# Patient Record
Sex: FEMALE | Race: WHITE | Employment: OTHER | URBAN - METROPOLITAN AREA
[De-identification: names, ages, dates, MRNs, and addresses within clinical notes are randomized per-mention and may not be internally consistent; named-entity substitution may affect disease eponyms.]

---

## 2017-06-15 PROBLEM — M75.01 ADHESIVE CAPSULITIS OF RIGHT SHOULDER: Status: ACTIVE | Noted: 2017-06-15

## 2017-06-16 PROBLEM — M25.819 SHOULDER IMPINGEMENT: Status: ACTIVE | Noted: 2017-06-16

## 2017-06-16 PROBLEM — M75.40 SHOULDER IMPINGEMENT: Status: ACTIVE | Noted: 2017-06-16

## 2017-06-27 PROBLEM — M17.11 PRIMARY OSTEOARTHRITIS OF RIGHT KNEE: Status: ACTIVE | Noted: 2017-06-27

## 2018-04-30 ENCOUNTER — HOSPITAL ENCOUNTER (OUTPATIENT)
Dept: GENERAL RADIOLOGY | Age: 53
Discharge: HOME | End: 2018-05-02
Payer: COMMERCIAL

## 2018-04-30 ENCOUNTER — HOSPITAL ENCOUNTER (OUTPATIENT)
Age: 53
Discharge: HOME | End: 2018-05-02
Payer: COMMERCIAL

## 2018-04-30 ENCOUNTER — HOSPITAL ENCOUNTER (OUTPATIENT)
Age: 53
Discharge: HOME | End: 2018-04-30
Payer: COMMERCIAL

## 2018-04-30 DIAGNOSIS — R06.02 SOB (SHORTNESS OF BREATH): ICD-10-CM

## 2018-04-30 LAB
ABO/RH: NORMAL
ANION GAP SERPL CALCULATED.3IONS-SCNC: 13 MMOL/L (ref 7–16)
ANTIBODY SCREEN: NORMAL
APTT: 27.8 SEC (ref 24.5–35.1)
BACTERIA: ABNORMAL /HPF
BILIRUBIN URINE: NEGATIVE
BLOOD, URINE: ABNORMAL
BUN BLDV-MCNC: 31 MG/DL (ref 6–20)
CALCIUM SERPL-MCNC: 9.6 MG/DL (ref 8.6–10.2)
CHLORIDE BLD-SCNC: 99 MMOL/L (ref 98–107)
CLARITY: CLEAR
CO2: 25 MMOL/L (ref 22–29)
COLOR: YELLOW
CREAT SERPL-MCNC: 1.4 MG/DL (ref 0.5–1)
EPITHELIAL CELLS, UA: ABNORMAL /HPF
GFR AFRICAN AMERICAN: 47
GFR NON-AFRICAN AMERICAN: 39 ML/MIN/1.73
GLUCOSE BLD-MCNC: 112 MG/DL (ref 74–109)
GLUCOSE URINE: NEGATIVE MG/DL
HCT VFR BLD CALC: 35.9 % (ref 34–48)
HEMOGLOBIN: 11.7 G/DL (ref 11.5–15.5)
INR BLD: 1
KETONES, URINE: NEGATIVE MG/DL
LEUKOCYTE ESTERASE, URINE: ABNORMAL
MCH RBC QN AUTO: 28.7 PG (ref 26–35)
MCHC RBC AUTO-ENTMCNC: 32.6 % (ref 32–34.5)
MCV RBC AUTO: 88 FL (ref 80–99.9)
NITRITE, URINE: NEGATIVE
PDW BLD-RTO: 12.5 FL (ref 11.5–15)
PH UA: 6.5 (ref 5–9)
PLATELET # BLD: 394 E9/L (ref 130–450)
PMV BLD AUTO: 9.5 FL (ref 7–12)
POTASSIUM SERPL-SCNC: 5.7 MMOL/L (ref 3.5–5)
PROTEIN UA: ABNORMAL MG/DL
PROTHROMBIN TIME: 11 SEC (ref 9.3–12.4)
RBC # BLD: 4.08 E12/L (ref 3.5–5.5)
RBC UA: ABNORMAL /HPF (ref 0–2)
SODIUM BLD-SCNC: 137 MMOL/L (ref 132–146)
SPECIFIC GRAVITY UA: 1.02 (ref 1–1.03)
UROBILINOGEN, URINE: 0.2 E.U./DL
WBC # BLD: 8.5 E9/L (ref 4.5–11.5)
WBC UA: ABNORMAL /HPF (ref 0–5)

## 2018-04-30 PROCEDURE — 86901 BLOOD TYPING SEROLOGIC RH(D): CPT

## 2018-04-30 PROCEDURE — 86850 RBC ANTIBODY SCREEN: CPT

## 2018-04-30 PROCEDURE — 85730 THROMBOPLASTIN TIME PARTIAL: CPT

## 2018-04-30 PROCEDURE — 71046 X-RAY EXAM CHEST 2 VIEWS: CPT

## 2018-04-30 PROCEDURE — 81001 URINALYSIS AUTO W/SCOPE: CPT

## 2018-04-30 PROCEDURE — 36415 COLL VENOUS BLD VENIPUNCTURE: CPT

## 2018-04-30 PROCEDURE — 85610 PROTHROMBIN TIME: CPT

## 2018-04-30 PROCEDURE — 85027 COMPLETE CBC AUTOMATED: CPT

## 2018-04-30 PROCEDURE — 86900 BLOOD TYPING SEROLOGIC ABO: CPT

## 2018-04-30 PROCEDURE — 80048 BASIC METABOLIC PNL TOTAL CA: CPT

## 2018-05-02 ENCOUNTER — HOSPITAL ENCOUNTER (OUTPATIENT)
Age: 53
Discharge: HOME | End: 2018-05-02
Payer: COMMERCIAL

## 2018-05-02 LAB
ANION GAP SERPL CALCULATED.3IONS-SCNC: 11 MMOL/L (ref 7–16)
BUN BLDV-MCNC: 39 MG/DL (ref 6–20)
CALCIUM SERPL-MCNC: 9.1 MG/DL (ref 8.6–10.2)
CHLORIDE BLD-SCNC: 97 MMOL/L (ref 98–107)
CO2: 24 MMOL/L (ref 22–29)
CREAT SERPL-MCNC: 1.3 MG/DL (ref 0.5–1)
GFR AFRICAN AMERICAN: 52
GFR NON-AFRICAN AMERICAN: 43 ML/MIN/1.73
GLUCOSE BLD-MCNC: 226 MG/DL (ref 74–109)
POTASSIUM SERPL-SCNC: 5.3 MMOL/L (ref 3.5–5)
SODIUM BLD-SCNC: 132 MMOL/L (ref 132–146)

## 2018-05-02 PROCEDURE — 80048 BASIC METABOLIC PNL TOTAL CA: CPT

## 2018-05-02 PROCEDURE — 36415 COLL VENOUS BLD VENIPUNCTURE: CPT

## 2018-05-03 ENCOUNTER — HOSPITAL ENCOUNTER (OUTPATIENT)
Dept: CARDIAC CATH/INVASIVE PROCEDURES | Age: 53
Discharge: HOME | End: 2018-05-03
Payer: COMMERCIAL

## 2018-05-03 VITALS
WEIGHT: 225 LBS | OXYGEN SATURATION: 97 % | DIASTOLIC BLOOD PRESSURE: 99 MMHG | RESPIRATION RATE: 17 BRPM | HEART RATE: 80 BPM | TEMPERATURE: 98.5 F | HEIGHT: 68 IN | SYSTOLIC BLOOD PRESSURE: 152 MMHG | BODY MASS INDEX: 34.1 KG/M2

## 2018-05-03 PROCEDURE — 2580000003 HC RX 258: Performed by: INTERNAL MEDICINE

## 2018-05-03 PROCEDURE — C1769 GUIDE WIRE: HCPCS

## 2018-05-03 PROCEDURE — 2709999900 HC NON-CHARGEABLE SUPPLY

## 2018-05-03 PROCEDURE — C1894 INTRO/SHEATH, NON-LASER: HCPCS

## 2018-05-03 PROCEDURE — 93454 CORONARY ARTERY ANGIO S&I: CPT | Performed by: INTERNAL MEDICINE

## 2018-05-03 PROCEDURE — 6360000002 HC RX W HCPCS

## 2018-05-03 PROCEDURE — 2500000003 HC RX 250 WO HCPCS

## 2018-05-03 RX ORDER — SODIUM CHLORIDE 9 MG/ML
INJECTION, SOLUTION INTRAVENOUS CONTINUOUS
Status: DISCONTINUED | OUTPATIENT
Start: 2018-05-03 | End: 2018-05-04 | Stop reason: HOSPADM

## 2018-05-03 RX ADMIN — SODIUM CHLORIDE: 9 INJECTION, SOLUTION INTRAVENOUS at 10:13

## 2018-05-22 ENCOUNTER — HOSPITAL ENCOUNTER (OUTPATIENT)
Dept: INTERVENTIONAL RADIOLOGY/VASCULAR | Age: 53
Discharge: HOME | End: 2018-05-24
Payer: COMMERCIAL

## 2018-05-22 ENCOUNTER — HOSPITAL ENCOUNTER (OUTPATIENT)
Dept: NON INVASIVE DIAGNOSTICS | Age: 53
Discharge: HOME | End: 2018-05-22
Payer: COMMERCIAL

## 2018-05-22 ENCOUNTER — HOSPITAL ENCOUNTER (OUTPATIENT)
Dept: ULTRASOUND IMAGING | Age: 53
End: 2018-05-22
Payer: COMMERCIAL

## 2018-05-22 DIAGNOSIS — I25.10 CVD (CARDIOVASCULAR DISEASE): ICD-10-CM

## 2018-05-22 DIAGNOSIS — Z01.810 PRE-OPERATIVE CARDIOVASCULAR EXAMINATION: ICD-10-CM

## 2018-05-22 LAB
LV EF: 32 %
LVEF MODALITY: NORMAL

## 2018-05-22 PROCEDURE — 93970 EXTREMITY STUDY: CPT

## 2018-05-22 PROCEDURE — 93880 EXTRACRANIAL BILAT STUDY: CPT

## 2018-05-22 PROCEDURE — 93306 TTE W/DOPPLER COMPLETE: CPT

## 2018-07-12 ENCOUNTER — HOSPITAL ENCOUNTER (OUTPATIENT)
Dept: INTERVENTIONAL RADIOLOGY/VASCULAR | Age: 53
End: 2018-07-12
Payer: COMMERCIAL

## 2018-07-12 ENCOUNTER — HOSPITAL ENCOUNTER (OUTPATIENT)
Dept: ULTRASOUND IMAGING | Age: 53
Discharge: HOME | End: 2018-07-12
Payer: COMMERCIAL

## 2018-07-12 DIAGNOSIS — M79.604 RIGHT LEG PAIN: ICD-10-CM

## 2018-07-12 PROCEDURE — 93971 EXTREMITY STUDY: CPT

## 2018-07-19 ENCOUNTER — APPOINTMENT (OUTPATIENT)
Dept: GENERAL RADIOLOGY | Age: 53
End: 2018-07-19
Payer: COMMERCIAL

## 2018-07-19 ENCOUNTER — APPOINTMENT (OUTPATIENT)
Dept: ULTRASOUND IMAGING | Age: 53
End: 2018-07-19
Payer: COMMERCIAL

## 2018-07-19 ENCOUNTER — HOSPITAL ENCOUNTER (EMERGENCY)
Age: 53
Discharge: HOME | End: 2018-07-19
Payer: COMMERCIAL

## 2018-07-19 VITALS
TEMPERATURE: 98.7 F | RESPIRATION RATE: 20 BRPM | SYSTOLIC BLOOD PRESSURE: 151 MMHG | BODY MASS INDEX: 32.58 KG/M2 | HEART RATE: 78 BPM | WEIGHT: 215 LBS | DIASTOLIC BLOOD PRESSURE: 70 MMHG | HEIGHT: 68 IN | OXYGEN SATURATION: 98 %

## 2018-07-19 DIAGNOSIS — R60.9 PERIPHERAL EDEMA: ICD-10-CM

## 2018-07-19 DIAGNOSIS — M25.461 EFFUSION OF RIGHT KNEE: Primary | ICD-10-CM

## 2018-07-19 PROCEDURE — 93971 EXTREMITY STUDY: CPT

## 2018-07-19 PROCEDURE — 6370000000 HC RX 637 (ALT 250 FOR IP): Performed by: NURSE PRACTITIONER

## 2018-07-19 PROCEDURE — 73562 X-RAY EXAM OF KNEE 3: CPT

## 2018-07-19 PROCEDURE — 99284 EMERGENCY DEPT VISIT MOD MDM: CPT

## 2018-07-19 RX ORDER — ACETAMINOPHEN 325 MG/1
650 TABLET ORAL 2 TIMES DAILY PRN
COMMUNITY
End: 2021-02-25

## 2018-07-19 RX ORDER — HYDROCODONE BITARTRATE AND ACETAMINOPHEN 5; 325 MG/1; MG/1
1 TABLET ORAL ONCE
Status: COMPLETED | OUTPATIENT
Start: 2018-07-19 | End: 2018-07-19

## 2018-07-19 RX ORDER — AMIODARONE HYDROCHLORIDE 200 MG/1
200 TABLET ORAL DAILY
COMMUNITY
End: 2020-01-30

## 2018-07-19 RX ORDER — METOPROLOL SUCCINATE 25 MG/1
25 TABLET, EXTENDED RELEASE ORAL DAILY
COMMUNITY
End: 2020-09-25 | Stop reason: SDUPTHER

## 2018-07-19 RX ORDER — BUPROPION HYDROCHLORIDE 150 MG/1
300 TABLET ORAL EVERY MORNING
COMMUNITY
End: 2020-01-30

## 2018-07-19 RX ORDER — HYDROCODONE BITARTRATE AND ACETAMINOPHEN 5; 325 MG/1; MG/1
1 TABLET ORAL EVERY 6 HOURS PRN
Qty: 12 TABLET | Refills: 0 | Status: SHIPPED | OUTPATIENT
Start: 2018-07-19 | End: 2018-07-22

## 2018-07-19 RX ADMIN — HYDROCODONE BITARTRATE AND ACETAMINOPHEN 1 TABLET: 5; 325 TABLET ORAL at 19:50

## 2018-07-19 ASSESSMENT — PAIN SCALES - GENERAL
PAINLEVEL_OUTOF10: 9
PAINLEVEL_OUTOF10: 9

## 2018-07-19 ASSESSMENT — PAIN DESCRIPTION - LOCATION: LOCATION: KNEE

## 2018-07-19 ASSESSMENT — PAIN DESCRIPTION - DESCRIPTORS: DESCRIPTORS: PRESSURE;STABBING

## 2018-07-19 ASSESSMENT — PAIN DESCRIPTION - ORIENTATION: ORIENTATION: RIGHT

## 2018-07-19 ASSESSMENT — PAIN DESCRIPTION - PAIN TYPE: TYPE: ACUTE PAIN

## 2018-07-19 ASSESSMENT — PAIN DESCRIPTION - FREQUENCY: FREQUENCY: CONTINUOUS

## 2018-07-19 NOTE — ED PROVIDER NOTES
2018    Dr. Boyd Koroma TRANSESOPHAGEAL ECHOCARDIOGRAM  10/28/2016    Dr. Horacio Boswell r/o embolic source   Social History:  reports that she has been smoking. She has been smoking about 1.00 pack per day. She has never used smokeless tobacco. She reports that she does not drink alcohol or use drugs. Family History: family history is not on file. Allergies: Ace inhibitors and Percocet [oxycodone-acetaminophen]    Physical Exam          ED Triage Vitals [18 1921]   BP Temp Temp Source Pulse Resp SpO2 Height Weight   (!) 151/70 98.7 °F (37.1 °C) Oral 78 20 98 % 5' 8\" (1.727 m) 215 lb (97.5 kg)       Oxygen Saturation Interpretation: Normal.    Constitutional:  Alert, development consistent with age. Neck:  Normal ROM. Supple. Knee:  Right anterior:             Tenderness:  moderate. .              Swelling/Effusion: Mild. Deformity: no.              ROM: diminished range with pain. Hip:            Tenderness:  none. Swelling: None. Deformity: no.              ROM: full range of motion. Skin:  no erythema, rash or wounds noted. Joint(s) Below: ankle. Tenderness:  none. Swelling: No.              Deformity: no.             ROM: full range of motion. Skin:  no erythema, rash or wounds noted. Neurovascular: Motor deficit: none. Sensory deficit: none. Pulse deficit: none. Capillary refill: normal.  Right Calf:            Tenderness:  mild. Swellin+ nonpitting. Deformity: no.              ROM: full range of motion. Skin:  no erythema, rash or wounds noted. Gait:  limp. Lymphatics: No lymphangitis or adenopathy noted. Neurological:  Oriented. Motor functions intact.     Lab / Imaging Results   (All laboratory and radiology results have been personally reviewed by myself)  Labs:  No results found for this visit on 07/19/18. Imaging: All Radiology results interpreted by Radiologist unless otherwise noted. US DUP LOWER EXTREMITY RIGHT FANY   Final Result   No evidence of right lower extremity deep venous thrombus   from common femoral vein to proximal calf. XR KNEE RIGHT (3 VIEWS)   Final Result   No fractures. Joint effusion and arthritis. ED Course / Medical Decision Making     Medications   HYDROcodone-acetaminophen (NORCO) 5-325 MG per tablet 1 tablet (1 tablet Oral Given 7/19/18 1950)        Consult(s):   None    Procedure(s):   none. Medical Decision Making:    X-ray of the right knee was negative for acute fracture or dislocation. X-ray did show knee effusion and this is consistent with the clinical exam.  Ultrasound of the right lower extremity was repeated related to recent surgery and bilateral lower extremity edema. Ultrasound was negative for DVT. Her symptoms improve her pain medication in the emergency department. Plan is subsequently for symptom control, limited use and  immobilization with appropriate outpatient follow-up. She is instructed to return to the emergency department immediately with any new or worsening symptoms. Counseling: The emergency provider has spoken with the patient and daughter and discussed todays results, in addition to providing specific details for the plan of care and counseling regarding the diagnosis and prognosis. Questions are answered at this time and they are agreeable with the plan. Assessment      1. Effusion of right knee    2. Peripheral edema      Plan   Discharge to home  Patient condition is good    New Medications     New Prescriptions    HYDROCODONE-ACETAMINOPHEN (NORCO) 5-325 MG PER TABLET    Take 1 tablet by mouth every 6 hours as needed for Pain for up to 3 days. .     Electronically signed by DAVID Joseph CNP   DD: 7/19/18  **This report was transcribed using voice

## 2019-10-08 ENCOUNTER — OFFICE VISIT (OUTPATIENT)
Dept: ORTHOPEDIC SURGERY | Age: 54
End: 2019-10-08
Payer: MEDICARE

## 2019-10-08 VITALS — BODY MASS INDEX: 35.46 KG/M2 | HEIGHT: 68 IN | TEMPERATURE: 98 F | WEIGHT: 234 LBS

## 2019-10-08 DIAGNOSIS — M16.11 PRIMARY OSTEOARTHRITIS OF RIGHT HIP: ICD-10-CM

## 2019-10-08 DIAGNOSIS — M21.371 RIGHT FOOT DROP: ICD-10-CM

## 2019-10-08 DIAGNOSIS — M25.551 RIGHT HIP PAIN: Primary | ICD-10-CM

## 2019-10-08 PROCEDURE — 99214 OFFICE O/P EST MOD 30 MIN: CPT | Performed by: ORTHOPAEDIC SURGERY

## 2019-10-08 PROCEDURE — G8484 FLU IMMUNIZE NO ADMIN: HCPCS | Performed by: ORTHOPAEDIC SURGERY

## 2019-10-08 PROCEDURE — G8417 CALC BMI ABV UP PARAM F/U: HCPCS | Performed by: ORTHOPAEDIC SURGERY

## 2019-10-08 PROCEDURE — G8598 ASA/ANTIPLAT THER USED: HCPCS | Performed by: ORTHOPAEDIC SURGERY

## 2019-10-08 PROCEDURE — 3017F COLORECTAL CA SCREEN DOC REV: CPT | Performed by: ORTHOPAEDIC SURGERY

## 2019-10-08 PROCEDURE — G8427 DOCREV CUR MEDS BY ELIG CLIN: HCPCS | Performed by: ORTHOPAEDIC SURGERY

## 2019-10-08 PROCEDURE — 4004F PT TOBACCO SCREEN RCVD TLK: CPT | Performed by: ORTHOPAEDIC SURGERY

## 2019-10-16 ENCOUNTER — HOSPITAL ENCOUNTER (OUTPATIENT)
Dept: INTERVENTIONAL RADIOLOGY/VASCULAR | Age: 54
Discharge: HOME | End: 2019-10-16
Payer: MEDICARE

## 2019-10-16 DIAGNOSIS — M16.11 PRIMARY OSTEOARTHRITIS OF RIGHT HIP: ICD-10-CM

## 2019-10-16 PROCEDURE — 2500000003 HC RX 250 WO HCPCS: Performed by: RADIOLOGY

## 2019-10-16 PROCEDURE — 20610 DRAIN/INJ JOINT/BURSA W/O US: CPT | Performed by: RADIOLOGY

## 2019-10-16 PROCEDURE — 77002 NEEDLE LOCALIZATION BY XRAY: CPT | Performed by: RADIOLOGY

## 2019-10-16 PROCEDURE — 6360000002 HC RX W HCPCS: Performed by: RADIOLOGY

## 2019-10-16 PROCEDURE — 6360000004 HC RX CONTRAST MEDICATION: Performed by: RADIOLOGY

## 2019-10-16 RX ORDER — TRIAMCINOLONE ACETONIDE 40 MG/ML
40 INJECTION, SUSPENSION INTRA-ARTICULAR; INTRAMUSCULAR ONCE
Status: DISCONTINUED | OUTPATIENT
Start: 2019-10-16 | End: 2019-10-16 | Stop reason: ALTCHOICE

## 2019-10-16 RX ORDER — BUPIVACAINE HYDROCHLORIDE 2.5 MG/ML
8 INJECTION, SOLUTION EPIDURAL; INFILTRATION; INTRACAUDAL ONCE
Status: DISCONTINUED | OUTPATIENT
Start: 2019-10-16 | End: 2019-10-16 | Stop reason: ALTCHOICE

## 2019-10-16 RX ADMIN — IOPAMIDOL 3 ML: 612 INJECTION, SOLUTION INTRATHECAL at 13:00

## 2019-10-16 RX ADMIN — BUPIVACAINE HYDROCHLORIDE 20 MG: 2.5 INJECTION, SOLUTION EPIDURAL; INFILTRATION; INTRACAUDAL; PERINEURAL at 13:00

## 2019-10-16 RX ADMIN — TRIAMCINOLONE ACETONIDE 40 MG: 40 INJECTION, SUSPENSION INTRA-ARTICULAR; INTRAMUSCULAR at 13:00

## 2019-10-16 ASSESSMENT — PAIN SCALES - GENERAL: PAINLEVEL_OUTOF10: 5

## 2020-01-16 VITALS
DIASTOLIC BLOOD PRESSURE: 80 MMHG | SYSTOLIC BLOOD PRESSURE: 146 MMHG | HEART RATE: 76 BPM | WEIGHT: 226 LBS | HEIGHT: 68 IN | BODY MASS INDEX: 34.25 KG/M2

## 2020-01-16 RX ORDER — CHOLECALCIFEROL (VITAMIN D3) 125 MCG
5000 CAPSULE ORAL DAILY
Status: ON HOLD | COMMUNITY
End: 2021-06-12 | Stop reason: HOSPADM

## 2020-01-24 NOTE — PROGRESS NOTES
 insulin glargine (BASAGLAR KWIKPEN) 100 UNIT/ML injection pen Inject 26 Units into the skin 2 times daily 26 am   30 units in pm      metoprolol succinate (TOPROL XL) 25 MG extended release tablet Take 25 mg by mouth daily      acetaminophen (TYLENOL) 325 MG tablet Take 650 mg by mouth 2 times daily as needed for Pain      sacubitril-valsartan (ENTRESTO) 24-26 MG per tablet Take by mouth 2 times daily Take a half of a tablet BID      pantoprazole (PROTONIX) 40 MG tablet       furosemide (LASIX) 20 MG tablet 20 mg every other day       atorvastatin (LIPITOR) 40 MG tablet       escitalopram (LEXAPRO) 20 MG tablet       ferrous sulfate 325 (65 Fe) MG tablet       BREO ELLIPTA 200-25 MCG/INH AEPB       traZODone (DESYREL) 50 MG tablet Take 50 mg by mouth nightly      gabapentin (NEURONTIN) 300 MG capsule Take 300 mg by mouth 2 times daily. Raisa Guard clopidogrel (PLAVIX) 75 MG tablet Take 75 mg by mouth daily      albuterol sulfate  (90 BASE) MCG/ACT inhaler Inhale 2 puffs into the lungs every 6 hours as needed for Wheezing      aspirin 81 MG EC tablet Take 1 tablet by mouth daily 30 tablet 3    metFORMIN (GLUCOPHAGE) 1000 MG tablet Take 1 tablet by mouth 2 times daily (with meals) 60 tablet 3     No current facility-administered medications for this visit.           Allergies as of 01/30/2020 - Review Complete 01/30/2020   Allergen Reaction Noted    Ace inhibitors Other (See Comments) 11/11/2016    Percocet [oxycodone-acetaminophen] Other (See Comments) 11/11/2016       Social History     Socioeconomic History    Marital status:      Spouse name: Not on file    Number of children: Not on file    Years of education: Not on file    Highest education level: Not on file   Occupational History    Not on file   Social Needs    Financial resource strain: Not on file    Food insecurity:     Worry: Not on file     Inability: Not on file    Transportation needs:     Medical: Not on file alert cooperative, no apparent distress, and appears stated age. HEENT:  Moist and pink mucous membranes, normocephalic, without obvious abnormality, atraumatic, normal ears and nose. Neck:  Supple, symmetrical, trachea midline, no JVD, no adenopathy, thyroid symmetric, not enlarged and no tenderness, good carotid upstroke bilaterally, no carotid bruit, skin normal  Lungs: No increased work of breathing, good air exchange, clear to auscultation bilaterally, no crackles or wheezing. Cardiovascular: Normal apical impulse, regular rate and rhythm, normal S1 and S2, no S3 or J1,3/4 systolic murmur at the apex, 2/6 systolic murmur at the left lower sternal border, trace pedal edema, good carotid upstroke bilaterally, no carotid bruit, no JVD, no abdominal pulsating masses. Abdomen: Soft, nontender, no hepatomegaly, no splenomegaly, bowel sound positive. CHEST:  Expands symmetrically, nontender to palpation, healed midsternal surgical scar. Musculoskeletal:  No clubbing or cyanosis. No redness, warmth, or swelling of the joints. Neurological: Alert, awake, and oriented X3. /60 (Site: Left Upper Arm, Position: Sitting, Cuff Size: Large Adult)   Ht 5' 8\" (1.727 m)   Wt 246 lb (111.6 kg)   BMI 37.40 kg/m²     DATA:  I personally reviewed the visit EKG with the following interpretation: Sinus rhythm          EKG - (5/30/2019) I personally reviewed the EKG with the final interpretation: Sinus rhythm, old inferior wall MI age indeterminate, nonspecific T-wave changes, no significant changes when compared to previous. ECHO: 12/7/18 12/7/2018) Severely depressed left ventricular systolic function with stage I diastolic dysfunction. Mildly dilated left atrium. Mild mitral valve regurgitation. Moderate tricuspid valve regurgitation with mild pulmonary hypertension. Stress Test: 3/27/18 1. Negative Lexiscan stress test for ischemic symptoms or ischemic EKG changes  2.   Abnormal Cardiolite testing as outlined above:      IMPRESSION:  1:  Atherosclerotic heart disease of native coronary artery :  Status post CABG and then the eighth 2018, we'll obtain records from [facility]                2:  Chronic systolic (congestive) heart failure :   well compensated will continue current treatment. 3: Ischemic cardiomyopathy :  Last documented ejection fraction was 30-35 percent              4: Essential (primary) hypertension:   controlled, continue current treatment. 5: Nonrheumatic mitral (valve) insufficiency:  Mild               6:  Nonrheumatic tricuspid (valve) insufficiency :  Moderate              7: Pulmonary hypertension, unspecified:  Mild            8: Chronic obstructive pulmonary disease, unspecified            9: Cerebral infarction, unspecified               10:  Type 2 diabetes mellitus with other circulatory complication            RECOMMENDATIONS:   1. Continue current treatment  2. Preventive Cardiology: low salt, low cholesterol diet, daily exercise, cholesterol goal of total cholesterol less than 200, LDL less than 70,adherence to diabetic diet, diabetic medications, smoking cessation were all advised. 3. CHF: Daily weight, use of an extra Lasix for weight gain of more than 2-3 pounds in 24 hours, compliance with diuretics, low salt diet were all advised. 4.  Follow-up with Dr. Elva Castañeda as scheduled  5. Follow-up with Dr. Daniela Tipton in 6 months, sooner if symptomatic for any reason     I have reviewed my findings and recommendations with patient    Electronically signed by Derick Yoon MD on 1/31/2020 at 1:15 AM  NOTE: This report was transcribed using voice recognition software.  Every effort was made to ensure accuracy; however, inadvertent computerized transcription errors may be present

## 2020-01-30 ENCOUNTER — OFFICE VISIT (OUTPATIENT)
Dept: CARDIOLOGY CLINIC | Age: 55
End: 2020-01-30
Payer: MEDICARE

## 2020-01-30 VITALS
HEIGHT: 68 IN | WEIGHT: 246 LBS | SYSTOLIC BLOOD PRESSURE: 122 MMHG | BODY MASS INDEX: 37.28 KG/M2 | DIASTOLIC BLOOD PRESSURE: 60 MMHG

## 2020-01-30 PROCEDURE — 93000 ELECTROCARDIOGRAM COMPLETE: CPT | Performed by: INTERNAL MEDICINE

## 2020-01-30 PROCEDURE — 99214 OFFICE O/P EST MOD 30 MIN: CPT | Performed by: INTERNAL MEDICINE

## 2020-04-02 RX ORDER — SACUBITRIL AND VALSARTAN 24; 26 MG/1; MG/1
0.5 TABLET, FILM COATED ORAL 2 TIMES DAILY
Qty: 90 TABLET | Refills: 3 | Status: SHIPPED
Start: 2020-04-02 | End: 2021-03-30

## 2020-09-25 RX ORDER — FUROSEMIDE 20 MG/1
20 TABLET ORAL DAILY
Qty: 90 TABLET | Refills: 3 | Status: ON HOLD
Start: 2020-09-25 | End: 2021-06-12 | Stop reason: HOSPADM

## 2020-09-25 RX ORDER — METOPROLOL SUCCINATE 25 MG/1
25 TABLET, EXTENDED RELEASE ORAL DAILY
Qty: 90 TABLET | Refills: 3 | Status: ON HOLD
Start: 2020-09-25 | End: 2021-06-12 | Stop reason: HOSPADM

## 2021-01-19 ENCOUNTER — APPOINTMENT (OUTPATIENT)
Dept: GENERAL RADIOLOGY | Age: 56
DRG: 253 | End: 2021-01-19
Payer: MEDICARE

## 2021-01-19 ENCOUNTER — HOSPITAL ENCOUNTER (INPATIENT)
Age: 56
LOS: 6 days | Discharge: HOME | DRG: 253 | End: 2021-01-25
Attending: EMERGENCY MEDICINE | Admitting: INTERNAL MEDICINE
Payer: MEDICARE

## 2021-01-19 DIAGNOSIS — L03.115 CELLULITIS OF RIGHT FOOT: ICD-10-CM

## 2021-01-19 DIAGNOSIS — E11.621 DIABETIC ULCER OF TOE OF RIGHT FOOT ASSOCIATED WITH TYPE 2 DIABETES MELLITUS, UNSPECIFIED ULCER STAGE (HCC): Primary | ICD-10-CM

## 2021-01-19 DIAGNOSIS — L97.519 DIABETIC ULCER OF TOE OF RIGHT FOOT ASSOCIATED WITH TYPE 2 DIABETES MELLITUS, UNSPECIFIED ULCER STAGE (HCC): Primary | ICD-10-CM

## 2021-01-19 PROBLEM — L03.119 CELLULITIS OF FOOT: Status: ACTIVE | Noted: 2021-01-19

## 2021-01-19 LAB
ALBUMIN SERPL-MCNC: 3.9 G/DL (ref 3.5–5.2)
ALP BLD-CCNC: 119 U/L (ref 35–104)
ALT SERPL-CCNC: 11 U/L (ref 0–32)
ANION GAP SERPL CALCULATED.3IONS-SCNC: 14 MMOL/L (ref 7–16)
AST SERPL-CCNC: 11 U/L (ref 0–31)
BASOPHILS ABSOLUTE: 0.07 E9/L (ref 0–0.2)
BASOPHILS RELATIVE PERCENT: 0.5 % (ref 0–2)
BILIRUB SERPL-MCNC: 0.2 MG/DL (ref 0–1.2)
BILIRUBIN URINE: NEGATIVE
BLOOD, URINE: NEGATIVE
BUN BLDV-MCNC: 32 MG/DL (ref 6–20)
CALCIUM SERPL-MCNC: 9.6 MG/DL (ref 8.6–10.2)
CHLORIDE BLD-SCNC: 96 MMOL/L (ref 98–107)
CLARITY: CLEAR
CO2: 21 MMOL/L (ref 22–29)
COLOR: YELLOW
CREAT SERPL-MCNC: 1.5 MG/DL (ref 0.5–1)
EOSINOPHILS ABSOLUTE: 0.27 E9/L (ref 0.05–0.5)
EOSINOPHILS RELATIVE PERCENT: 1.9 % (ref 0–6)
GFR AFRICAN AMERICAN: 43
GFR NON-AFRICAN AMERICAN: 36 ML/MIN/1.73
GLUCOSE BLD-MCNC: 161 MG/DL (ref 74–99)
GLUCOSE URINE: 500 MG/DL
HCT VFR BLD CALC: 43.9 % (ref 34–48)
HEMOGLOBIN: 13.8 G/DL (ref 11.5–15.5)
IMMATURE GRANULOCYTES #: 0.05 E9/L
IMMATURE GRANULOCYTES %: 0.3 % (ref 0–5)
KETONES, URINE: NEGATIVE MG/DL
LACTIC ACID: 2.5 MMOL/L (ref 0.5–2.2)
LEUKOCYTE ESTERASE, URINE: NEGATIVE
LYMPHOCYTES ABSOLUTE: 2.29 E9/L (ref 1.5–4)
LYMPHOCYTES RELATIVE PERCENT: 15.9 % (ref 20–42)
MCH RBC QN AUTO: 28 PG (ref 26–35)
MCHC RBC AUTO-ENTMCNC: 31.4 % (ref 32–34.5)
MCV RBC AUTO: 89.2 FL (ref 80–99.9)
MONOCYTES ABSOLUTE: 0.88 E9/L (ref 0.1–0.95)
MONOCYTES RELATIVE PERCENT: 6.1 % (ref 2–12)
NEUTROPHILS ABSOLUTE: 10.83 E9/L (ref 1.8–7.3)
NEUTROPHILS RELATIVE PERCENT: 75.3 % (ref 43–80)
NITRITE, URINE: NEGATIVE
PDW BLD-RTO: 13.7 FL (ref 11.5–15)
PH UA: 6 (ref 5–9)
PLATELET # BLD: 437 E9/L (ref 130–450)
PMV BLD AUTO: 9.5 FL (ref 7–12)
POTASSIUM SERPL-SCNC: 4.8 MMOL/L (ref 3.5–5)
PROTEIN UA: NEGATIVE MG/DL
RBC # BLD: 4.92 E12/L (ref 3.5–5.5)
SEDIMENTATION RATE, ERYTHROCYTE: 44 MM/HR (ref 0–20)
SODIUM BLD-SCNC: 131 MMOL/L (ref 132–146)
SPECIFIC GRAVITY UA: 1.01 (ref 1–1.03)
TOTAL PROTEIN: 8.4 G/DL (ref 6.4–8.3)
UROBILINOGEN, URINE: 0.2 E.U./DL
WBC # BLD: 14.4 E9/L (ref 4.5–11.5)

## 2021-01-19 PROCEDURE — 86140 C-REACTIVE PROTEIN: CPT

## 2021-01-19 PROCEDURE — 6360000002 HC RX W HCPCS: Performed by: PHYSICIAN ASSISTANT

## 2021-01-19 PROCEDURE — 2580000003 HC RX 258: Performed by: PHYSICIAN ASSISTANT

## 2021-01-19 PROCEDURE — 96375 TX/PRO/DX INJ NEW DRUG ADDON: CPT

## 2021-01-19 PROCEDURE — 1200000000 HC SEMI PRIVATE

## 2021-01-19 PROCEDURE — 87040 BLOOD CULTURE FOR BACTERIA: CPT

## 2021-01-19 PROCEDURE — 83605 ASSAY OF LACTIC ACID: CPT

## 2021-01-19 PROCEDURE — 80053 COMPREHEN METABOLIC PANEL: CPT

## 2021-01-19 PROCEDURE — 96365 THER/PROPH/DIAG IV INF INIT: CPT

## 2021-01-19 PROCEDURE — 73630 X-RAY EXAM OF FOOT: CPT

## 2021-01-19 PROCEDURE — 81003 URINALYSIS AUTO W/O SCOPE: CPT

## 2021-01-19 PROCEDURE — 99283 EMERGENCY DEPT VISIT LOW MDM: CPT

## 2021-01-19 PROCEDURE — 85651 RBC SED RATE NONAUTOMATED: CPT

## 2021-01-19 PROCEDURE — 85025 COMPLETE CBC W/AUTO DIFF WBC: CPT

## 2021-01-19 RX ORDER — ACETAMINOPHEN 650 MG/1
650 SUPPOSITORY RECTAL EVERY 6 HOURS PRN
Status: DISCONTINUED | OUTPATIENT
Start: 2021-01-19 | End: 2021-01-25 | Stop reason: HOSPADM

## 2021-01-19 RX ORDER — SODIUM CHLORIDE 0.9 % (FLUSH) 0.9 %
10 SYRINGE (ML) INJECTION EVERY 12 HOURS SCHEDULED
Status: DISCONTINUED | OUTPATIENT
Start: 2021-01-20 | End: 2021-01-25 | Stop reason: HOSPADM

## 2021-01-19 RX ORDER — BUDESONIDE AND FORMOTEROL FUMARATE DIHYDRATE 160; 4.5 UG/1; UG/1
2 AEROSOL RESPIRATORY (INHALATION) 2 TIMES DAILY
Status: DISCONTINUED | OUTPATIENT
Start: 2021-01-20 | End: 2021-01-20 | Stop reason: CLARIF

## 2021-01-19 RX ORDER — DEXTROSE MONOHYDRATE 25 G/50ML
12.5 INJECTION, SOLUTION INTRAVENOUS PRN
Status: DISCONTINUED | OUTPATIENT
Start: 2021-01-19 | End: 2021-01-25 | Stop reason: HOSPADM

## 2021-01-19 RX ORDER — NICOTINE POLACRILEX 4 MG
15 LOZENGE BUCCAL PRN
Status: DISCONTINUED | OUTPATIENT
Start: 2021-01-19 | End: 2021-01-25 | Stop reason: HOSPADM

## 2021-01-19 RX ORDER — SODIUM CHLORIDE 0.9 % (FLUSH) 0.9 %
10 SYRINGE (ML) INJECTION PRN
Status: DISCONTINUED | OUTPATIENT
Start: 2021-01-19 | End: 2021-01-25 | Stop reason: HOSPADM

## 2021-01-19 RX ORDER — GABAPENTIN 300 MG/1
300 CAPSULE ORAL 2 TIMES DAILY
Status: DISCONTINUED | OUTPATIENT
Start: 2021-01-20 | End: 2021-01-25 | Stop reason: HOSPADM

## 2021-01-19 RX ORDER — FUROSEMIDE 20 MG/1
20 TABLET ORAL DAILY
Status: DISCONTINUED | OUTPATIENT
Start: 2021-01-20 | End: 2021-01-25 | Stop reason: HOSPADM

## 2021-01-19 RX ORDER — INSULIN GLARGINE 100 [IU]/ML
26 INJECTION, SOLUTION SUBCUTANEOUS 2 TIMES DAILY
Status: DISCONTINUED | OUTPATIENT
Start: 2021-01-20 | End: 2021-01-21

## 2021-01-19 RX ORDER — DEXTROSE MONOHYDRATE 50 MG/ML
100 INJECTION, SOLUTION INTRAVENOUS PRN
Status: DISCONTINUED | OUTPATIENT
Start: 2021-01-19 | End: 2021-01-25 | Stop reason: HOSPADM

## 2021-01-19 RX ORDER — 0.9 % SODIUM CHLORIDE 0.9 %
1000 INTRAVENOUS SOLUTION INTRAVENOUS ONCE
Status: COMPLETED | OUTPATIENT
Start: 2021-01-19 | End: 2021-01-19

## 2021-01-19 RX ORDER — FERROUS SULFATE 325(65) MG
325 TABLET ORAL
Status: DISCONTINUED | OUTPATIENT
Start: 2021-01-20 | End: 2021-01-25 | Stop reason: HOSPADM

## 2021-01-19 RX ORDER — PANTOPRAZOLE SODIUM 40 MG/1
40 TABLET, DELAYED RELEASE ORAL
Status: DISCONTINUED | OUTPATIENT
Start: 2021-01-20 | End: 2021-01-25 | Stop reason: HOSPADM

## 2021-01-19 RX ORDER — METOPROLOL SUCCINATE 25 MG/1
25 TABLET, EXTENDED RELEASE ORAL DAILY
Status: DISCONTINUED | OUTPATIENT
Start: 2021-01-20 | End: 2021-01-25 | Stop reason: HOSPADM

## 2021-01-19 RX ORDER — ACETAMINOPHEN 325 MG/1
650 TABLET ORAL EVERY 6 HOURS PRN
Status: DISCONTINUED | OUTPATIENT
Start: 2021-01-19 | End: 2021-01-25 | Stop reason: HOSPADM

## 2021-01-19 RX ORDER — CLOPIDOGREL BISULFATE 75 MG/1
75 TABLET ORAL DAILY
Status: DISCONTINUED | OUTPATIENT
Start: 2021-01-20 | End: 2021-01-23

## 2021-01-19 RX ORDER — ASPIRIN 81 MG/1
81 TABLET ORAL DAILY
Status: DISCONTINUED | OUTPATIENT
Start: 2021-01-20 | End: 2021-01-23

## 2021-01-19 RX ORDER — ATORVASTATIN CALCIUM 40 MG/1
40 TABLET, FILM COATED ORAL NIGHTLY
Status: DISCONTINUED | OUTPATIENT
Start: 2021-01-20 | End: 2021-01-25 | Stop reason: HOSPADM

## 2021-01-19 RX ADMIN — PIPERACILLIN AND TAZOBACTAM 3.38 G: 3; .375 INJECTION, POWDER, FOR SOLUTION INTRAVENOUS at 21:47

## 2021-01-19 RX ADMIN — SODIUM CHLORIDE 1000 ML: 9 INJECTION, SOLUTION INTRAVENOUS at 21:30

## 2021-01-19 RX ADMIN — VANCOMYCIN HYDROCHLORIDE 2000 MG: 10 INJECTION, POWDER, LYOPHILIZED, FOR SOLUTION INTRAVENOUS at 22:25

## 2021-01-19 ASSESSMENT — PAIN DESCRIPTION - PAIN TYPE: TYPE: ACUTE PAIN

## 2021-01-19 ASSESSMENT — PAIN DESCRIPTION - ORIENTATION: ORIENTATION: RIGHT

## 2021-01-20 ENCOUNTER — APPOINTMENT (OUTPATIENT)
Dept: MRI IMAGING | Age: 56
DRG: 253 | End: 2021-01-20
Payer: MEDICARE

## 2021-01-20 ENCOUNTER — APPOINTMENT (OUTPATIENT)
Dept: INTERVENTIONAL RADIOLOGY/VASCULAR | Age: 56
DRG: 253 | End: 2021-01-20
Payer: MEDICARE

## 2021-01-20 LAB
ALBUMIN SERPL-MCNC: 3.5 G/DL (ref 3.5–5.2)
ALP BLD-CCNC: 98 U/L (ref 35–104)
ALT SERPL-CCNC: 8 U/L (ref 0–32)
ANION GAP SERPL CALCULATED.3IONS-SCNC: 10 MMOL/L (ref 7–16)
ANTISTREPTOLYSIN-O: 313 IU/ML (ref 0–200)
AST SERPL-CCNC: 8 U/L (ref 0–31)
BASOPHILS ABSOLUTE: 0.07 E9/L (ref 0–0.2)
BASOPHILS RELATIVE PERCENT: 0.8 % (ref 0–2)
BILIRUB SERPL-MCNC: 0.2 MG/DL (ref 0–1.2)
BUN BLDV-MCNC: 31 MG/DL (ref 6–20)
C-REACTIVE PROTEIN: 1.3 MG/DL (ref 0–0.4)
CALCIUM SERPL-MCNC: 9 MG/DL (ref 8.6–10.2)
CHLORIDE BLD-SCNC: 102 MMOL/L (ref 98–107)
CHOLESTEROL, TOTAL: 127 MG/DL (ref 0–199)
CO2: 23 MMOL/L (ref 22–29)
CREAT SERPL-MCNC: 1.4 MG/DL (ref 0.5–1)
EKG ATRIAL RATE: 80 BPM
EKG P AXIS: 79 DEGREES
EKG P-R INTERVAL: 200 MS
EKG Q-T INTERVAL: 424 MS
EKG QRS DURATION: 104 MS
EKG QTC CALCULATION (BAZETT): 489 MS
EKG R AXIS: 17 DEGREES
EKG T AXIS: 86 DEGREES
EKG VENTRICULAR RATE: 80 BPM
EOSINOPHILS ABSOLUTE: 0.19 E9/L (ref 0.05–0.5)
EOSINOPHILS RELATIVE PERCENT: 2.2 % (ref 0–6)
GFR AFRICAN AMERICAN: 47
GFR NON-AFRICAN AMERICAN: 39 ML/MIN/1.73
GLUCOSE BLD-MCNC: 173 MG/DL (ref 74–99)
HBA1C MFR BLD: 8.4 % (ref 4–5.6)
HCT VFR BLD CALC: 40.1 % (ref 34–48)
HDLC SERPL-MCNC: 27 MG/DL
HEMOGLOBIN: 12.8 G/DL (ref 11.5–15.5)
IMMATURE GRANULOCYTES #: 0.02 E9/L
IMMATURE GRANULOCYTES %: 0.2 % (ref 0–5)
LACTIC ACID: 1.4 MMOL/L (ref 0.5–2.2)
LDL CHOLESTEROL CALCULATED: 53 MG/DL (ref 0–99)
LYMPHOCYTES ABSOLUTE: 2.03 E9/L (ref 1.5–4)
LYMPHOCYTES RELATIVE PERCENT: 23.1 % (ref 20–42)
MAGNESIUM: 1.7 MG/DL (ref 1.6–2.6)
MCH RBC QN AUTO: 27.6 PG (ref 26–35)
MCHC RBC AUTO-ENTMCNC: 31.9 % (ref 32–34.5)
MCV RBC AUTO: 86.6 FL (ref 80–99.9)
METER GLUCOSE: 145 MG/DL (ref 74–99)
METER GLUCOSE: 161 MG/DL (ref 74–99)
METER GLUCOSE: 207 MG/DL (ref 74–99)
METER GLUCOSE: 229 MG/DL (ref 74–99)
METER GLUCOSE: 244 MG/DL (ref 74–99)
MONOCYTES ABSOLUTE: 0.73 E9/L (ref 0.1–0.95)
MONOCYTES RELATIVE PERCENT: 8.3 % (ref 2–12)
NEUTROPHILS ABSOLUTE: 5.76 E9/L (ref 1.8–7.3)
NEUTROPHILS RELATIVE PERCENT: 65.4 % (ref 43–80)
PDW BLD-RTO: 13.4 FL (ref 11.5–15)
PHOSPHORUS: 4 MG/DL (ref 2.5–4.5)
PLATELET # BLD: 378 E9/L (ref 130–450)
PMV BLD AUTO: 9.5 FL (ref 7–12)
POTASSIUM SERPL-SCNC: 4.4 MMOL/L (ref 3.5–5)
RBC # BLD: 4.63 E12/L (ref 3.5–5.5)
SODIUM BLD-SCNC: 135 MMOL/L (ref 132–146)
TOTAL PROTEIN: 7 G/DL (ref 6.4–8.3)
TRIGL SERPL-MCNC: 237 MG/DL (ref 0–149)
TSH SERPL DL<=0.05 MIU/L-ACNC: 2.68 UIU/ML (ref 0.27–4.2)
VLDLC SERPL CALC-MCNC: 47 MG/DL
WBC # BLD: 8.8 E9/L (ref 4.5–11.5)

## 2021-01-20 PROCEDURE — 1200000000 HC SEMI PRIVATE

## 2021-01-20 PROCEDURE — 83036 HEMOGLOBIN GLYCOSYLATED A1C: CPT

## 2021-01-20 PROCEDURE — 6370000000 HC RX 637 (ALT 250 FOR IP): Performed by: INTERNAL MEDICINE

## 2021-01-20 PROCEDURE — 84100 ASSAY OF PHOSPHORUS: CPT

## 2021-01-20 PROCEDURE — 84443 ASSAY THYROID STIM HORMONE: CPT

## 2021-01-20 PROCEDURE — 87070 CULTURE OTHR SPECIMN AEROBIC: CPT

## 2021-01-20 PROCEDURE — 82962 GLUCOSE BLOOD TEST: CPT

## 2021-01-20 PROCEDURE — 6360000002 HC RX W HCPCS: Performed by: INTERNAL MEDICINE

## 2021-01-20 PROCEDURE — 83605 ASSAY OF LACTIC ACID: CPT

## 2021-01-20 PROCEDURE — 93010 ELECTROCARDIOGRAM REPORT: CPT | Performed by: INTERNAL MEDICINE

## 2021-01-20 PROCEDURE — 99222 1ST HOSP IP/OBS MODERATE 55: CPT | Performed by: INTERNAL MEDICINE

## 2021-01-20 PROCEDURE — 87081 CULTURE SCREEN ONLY: CPT

## 2021-01-20 PROCEDURE — 36415 COLL VENOUS BLD VENIPUNCTURE: CPT

## 2021-01-20 PROCEDURE — 73718 MRI LOWER EXTREMITY W/O DYE: CPT

## 2021-01-20 PROCEDURE — 80053 COMPREHEN METABOLIC PANEL: CPT

## 2021-01-20 PROCEDURE — 86060 ANTISTREPTOLYSIN O TITER: CPT

## 2021-01-20 PROCEDURE — 94640 AIRWAY INHALATION TREATMENT: CPT

## 2021-01-20 PROCEDURE — 80061 LIPID PANEL: CPT

## 2021-01-20 PROCEDURE — 83735 ASSAY OF MAGNESIUM: CPT

## 2021-01-20 PROCEDURE — 2580000003 HC RX 258: Performed by: INTERNAL MEDICINE

## 2021-01-20 PROCEDURE — 87186 SC STD MICRODIL/AGAR DIL: CPT

## 2021-01-20 PROCEDURE — 85025 COMPLETE CBC W/AUTO DIFF WBC: CPT

## 2021-01-20 PROCEDURE — 2580000003 HC RX 258: Performed by: CLINICAL NURSE SPECIALIST

## 2021-01-20 PROCEDURE — 6360000002 HC RX W HCPCS: Performed by: CLINICAL NURSE SPECIALIST

## 2021-01-20 PROCEDURE — 93923 UPR/LXTR ART STDY 3+ LVLS: CPT

## 2021-01-20 PROCEDURE — 93005 ELECTROCARDIOGRAM TRACING: CPT | Performed by: NURSE PRACTITIONER

## 2021-01-20 PROCEDURE — APPSS60 APP SPLIT SHARED TIME 46-60 MINUTES: Performed by: PHYSICIAN ASSISTANT

## 2021-01-20 RX ORDER — GLUCOSAMINE/D3/BOSWELLIA SERRA 1500MG-400
TABLET ORAL DAILY
Status: ON HOLD | COMMUNITY
End: 2021-06-12 | Stop reason: HOSPADM

## 2021-01-20 RX ORDER — SODIUM CHLORIDE 9 MG/ML
INJECTION, SOLUTION INTRAVENOUS CONTINUOUS
Status: DISCONTINUED | OUTPATIENT
Start: 2021-01-20 | End: 2021-01-20

## 2021-01-20 RX ORDER — LANOLIN ALCOHOL/MO/W.PET/CERES
1000 CREAM (GRAM) TOPICAL DAILY
Status: ON HOLD | COMMUNITY
End: 2021-06-12 | Stop reason: HOSPADM

## 2021-01-20 RX ORDER — BUDESONIDE 0.5 MG/2ML
0.5 INHALANT ORAL 2 TIMES DAILY
Status: DISCONTINUED | OUTPATIENT
Start: 2021-01-20 | End: 2021-01-25 | Stop reason: HOSPADM

## 2021-01-20 RX ORDER — MULTIVIT WITH MINERALS/LUTEIN
1000 TABLET ORAL DAILY
Status: ON HOLD | COMMUNITY
End: 2021-06-12 | Stop reason: HOSPADM

## 2021-01-20 RX ORDER — TRAZODONE HYDROCHLORIDE 50 MG/1
50 TABLET ORAL NIGHTLY
Status: DISCONTINUED | OUTPATIENT
Start: 2021-01-20 | End: 2021-01-25 | Stop reason: HOSPADM

## 2021-01-20 RX ORDER — ZINC SULFATE 50(220)MG
50 CAPSULE ORAL DAILY
Status: DISCONTINUED | OUTPATIENT
Start: 2021-01-20 | End: 2021-01-25 | Stop reason: HOSPADM

## 2021-01-20 RX ORDER — ZINC GLUCONATE 50 MG
50 TABLET ORAL DAILY
Status: ON HOLD | COMMUNITY
End: 2021-06-10 | Stop reason: ALTCHOICE

## 2021-01-20 RX ORDER — SODIUM CHLORIDE 9 MG/ML
INJECTION, SOLUTION INTRAVENOUS EVERY 12 HOURS
Status: DISCONTINUED | OUTPATIENT
Start: 2021-01-20 | End: 2021-01-24

## 2021-01-20 RX ORDER — CANAGLIFLOZIN 300 MG/1
300 TABLET, FILM COATED ORAL
Status: ON HOLD | COMMUNITY
End: 2021-06-10

## 2021-01-20 RX ORDER — LORAZEPAM 1 MG/1
1 TABLET ORAL ONCE
Status: COMPLETED | OUTPATIENT
Start: 2021-01-20 | End: 2021-01-20

## 2021-01-20 RX ORDER — ARFORMOTEROL TARTRATE 15 UG/2ML
15 SOLUTION RESPIRATORY (INHALATION) 2 TIMES DAILY
Status: DISCONTINUED | OUTPATIENT
Start: 2021-01-20 | End: 2021-01-25 | Stop reason: HOSPADM

## 2021-01-20 RX ORDER — ESCITALOPRAM OXALATE 10 MG/1
20 TABLET ORAL DAILY
Status: DISCONTINUED | OUTPATIENT
Start: 2021-01-20 | End: 2021-01-25 | Stop reason: HOSPADM

## 2021-01-20 RX ORDER — LANOLIN ALCOHOL/MO/W.PET/CERES
1000 CREAM (GRAM) TOPICAL DAILY
Status: DISCONTINUED | OUTPATIENT
Start: 2021-01-20 | End: 2021-01-25 | Stop reason: HOSPADM

## 2021-01-20 RX ORDER — SODIUM CHLORIDE 9 MG/ML
INJECTION, SOLUTION INTRAVENOUS EVERY 8 HOURS
Status: DISCONTINUED | OUTPATIENT
Start: 2021-01-20 | End: 2021-01-20 | Stop reason: ALTCHOICE

## 2021-01-20 RX ORDER — ASCORBIC ACID 500 MG
1000 TABLET ORAL DAILY
Status: DISCONTINUED | OUTPATIENT
Start: 2021-01-20 | End: 2021-01-25 | Stop reason: HOSPADM

## 2021-01-20 RX ORDER — VIT C/B6/B5/MAGNESIUM/HERB 173 50-5-6-5MG
CAPSULE ORAL DAILY
Status: ON HOLD | COMMUNITY
End: 2021-06-12 | Stop reason: HOSPADM

## 2021-01-20 RX ADMIN — ENOXAPARIN SODIUM 40 MG: 40 INJECTION SUBCUTANEOUS at 14:10

## 2021-01-20 RX ADMIN — GABAPENTIN 300 MG: 300 CAPSULE ORAL at 22:23

## 2021-01-20 RX ADMIN — ACETAMINOPHEN 650 MG: 325 TABLET, FILM COATED ORAL at 22:23

## 2021-01-20 RX ADMIN — CEFEPIME 2000 MG: 2 INJECTION, POWDER, FOR SOLUTION INTRAMUSCULAR; INTRAVENOUS at 18:32

## 2021-01-20 RX ADMIN — INSULIN LISPRO 1 UNITS: 100 INJECTION, SOLUTION INTRAVENOUS; SUBCUTANEOUS at 22:28

## 2021-01-20 RX ADMIN — Medication 10 ML: at 21:30

## 2021-01-20 RX ADMIN — SACUBITRIL AND VALSARTAN 0.5 TABLET: 24; 26 TABLET, FILM COATED ORAL at 01:11

## 2021-01-20 RX ADMIN — PIPERACILLIN AND TAZOBACTAM 3375 MG: 3; .375 INJECTION, POWDER, FOR SOLUTION INTRAVENOUS at 05:28

## 2021-01-20 RX ADMIN — FERROUS SULFATE TAB 325 MG (65 MG ELEMENTAL FE) 325 MG: 325 (65 FE) TAB at 13:57

## 2021-01-20 RX ADMIN — PIPERACILLIN AND TAZOBACTAM 3375 MG: 3; .375 INJECTION, POWDER, FOR SOLUTION INTRAVENOUS at 14:15

## 2021-01-20 RX ADMIN — GABAPENTIN 300 MG: 300 CAPSULE ORAL at 00:46

## 2021-01-20 RX ADMIN — DESMOPRESSIN ACETATE 40 MG: 0.2 TABLET ORAL at 00:46

## 2021-01-20 RX ADMIN — CYANOCOBALAMIN TAB 1000 MCG 1000 MCG: 1000 TAB at 13:57

## 2021-01-20 RX ADMIN — SACUBITRIL AND VALSARTAN 0.5 TABLET: 24; 26 TABLET, FILM COATED ORAL at 22:22

## 2021-01-20 RX ADMIN — BUDESONIDE 500 MCG: 0.5 INHALANT RESPIRATORY (INHALATION) at 18:11

## 2021-01-20 RX ADMIN — Medication 50 MG: at 13:56

## 2021-01-20 RX ADMIN — METOPROLOL SUCCINATE 25 MG: 25 TABLET, EXTENDED RELEASE ORAL at 13:56

## 2021-01-20 RX ADMIN — OXYCODONE HYDROCHLORIDE AND ACETAMINOPHEN 1000 MG: 500 TABLET ORAL at 13:56

## 2021-01-20 RX ADMIN — SODIUM CHLORIDE: 9 INJECTION, SOLUTION INTRAVENOUS at 03:08

## 2021-01-20 RX ADMIN — BUDESONIDE 500 MCG: 0.5 INHALANT RESPIRATORY (INHALATION) at 06:15

## 2021-01-20 RX ADMIN — SACUBITRIL AND VALSARTAN 0.5 TABLET: 24; 26 TABLET, FILM COATED ORAL at 14:07

## 2021-01-20 RX ADMIN — DESMOPRESSIN ACETATE 40 MG: 0.2 TABLET ORAL at 22:23

## 2021-01-20 RX ADMIN — TRAZODONE HYDROCHLORIDE 50 MG: 50 TABLET ORAL at 22:22

## 2021-01-20 RX ADMIN — ESCITALOPRAM OXALATE 20 MG: 10 TABLET ORAL at 13:56

## 2021-01-20 RX ADMIN — LORAZEPAM 1 MG: 1 TABLET ORAL at 10:55

## 2021-01-20 RX ADMIN — PANTOPRAZOLE SODIUM 40 MG: 40 TABLET, DELAYED RELEASE ORAL at 05:28

## 2021-01-20 RX ADMIN — ARFORMOTEROL TARTRATE 15 MCG: 15 SOLUTION RESPIRATORY (INHALATION) at 06:15

## 2021-01-20 RX ADMIN — FUROSEMIDE 20 MG: 20 TABLET ORAL at 13:57

## 2021-01-20 RX ADMIN — VANCOMYCIN HYDROCHLORIDE 1750 MG: 5 INJECTION, POWDER, LYOPHILIZED, FOR SOLUTION INTRAVENOUS at 22:22

## 2021-01-20 RX ADMIN — INSULIN GLARGINE 26 UNITS: 100 INJECTION, SOLUTION SUBCUTANEOUS at 22:27

## 2021-01-20 RX ADMIN — ARFORMOTEROL TARTRATE 15 MCG: 15 SOLUTION RESPIRATORY (INHALATION) at 18:11

## 2021-01-20 RX ADMIN — GABAPENTIN 300 MG: 300 CAPSULE ORAL at 14:10

## 2021-01-20 ASSESSMENT — PAIN SCALES - GENERAL: PAINLEVEL_OUTOF10: 0

## 2021-01-20 NOTE — CONSULTS
chronic productive cough and chronic dyspnea on exertion due to her chronic obstructive pulmonary disease, which is currently at her normal baseline. She denies any dyspnea at rest, or home supplemental oxygen use. She admits to rare palpitations, as well as bilateral lower extremity edema when she does not take her diuretic therapy regularly. She states she has had mild worsening edema of the RLE as of recently with associated erythema. She denies any chest discomfort at rest or on exertion, nausea, vomiting, diaphoresis, dizziness, near-syncope or syncope. She denies any significant weight gain, paroxysmal nocturnal dyspnea or orthopnea. She denies subjective fever, chills, or any known recent sick contacts. She admits to medication compliance regarding most of her cardiac medications, but does admit at times to missing a few doses of Lasix if she feels she does not need it that day. She is a current tobacco smoker, smokes one to two packs/day. Has smoked for 40+ years. Denies former or current alcohol or illicit drug use. States her father had a history of peripheral vascular disease and diabetes mellitus. Denies any other pertinent cardiac family medical history at this time. Labs and diagnostic testing as noted below. Please note: past medical records were reviewed per electronic medical record (EMR) - see detailed reports under Past Medical/ Surgical History. PAST MEDICAL HISTORY:    1. Tobacco abuse. 2. Coronary artery disease s/p prior PCI, then CABG in 2018 at [facility]. Details unknown at this time. 3. Chronic HFrEF. Ischemic cardiomyopathy. 4. Hyperlipidemia, on statin therapy. 5. Obesity. 6. Hypertension. 7. Diabetes mellitus type II.   8. History of CVA. 9. Chronic obstructive pulmonary disease. 10. Valvular heart disease. 11. History of perforated gastric ulcer s/p surgical repair.   12. Chronic kidney disease, baseline creatinine 1.3 to 1.4 as noted in 2018.  13. Obstructive sleep apnea, compliant with CPAP. PRIOR CARDIAC TESTING     Echocardiogram (Dr. Regine Benton, 12/7/2018)   Severely depressed left ventricular systolic function with stage I diastolic dysfunction. LV global wall hypokinesis. LVEF 30-35%. Mildly dilated left atrium. Mild LVH. Mild mitral valve regurgitation. Moderate tricuspid valve regurgitation with mild pulmonary hypertension.     Lexiscan Stress Test (3/27/18)  1. Negative Lexiscan stress test for ischemic symptoms or ischemic EKG changes  2. Abnormal Cardiolite perfusion scan showing large fixed lateral wall defect with mild eduard-infarct ischemia, large, moderate reversible defect involving the anterior wall, fixed apical defect with mild to moderate eduard-infarct ischemia  3. Severely depressed left ventricular systolic function with global wall hypokinesis and paradoxical septal wall motion  4. There is no old comparison study     Cardiac cath (5/3/18)  ANGIOGRAPHIC FINDINGS:  1. The left main coronary artery arising from the left sinus of Valsalva. It is a good-sized vessel with 40% to 50% distal disease. It bifurcates  into left anterior descending artery and left circumflex artery. 2.  The left anterior descending artery is a long vessel extending down to  the apex. It gives off a mid size diagonal branch. The LAD has mild  disease in the mid segment. The rest of the vessel and the diagonal  branches have luminal irregularities. 3.  The left circumflex artery is a large vessel that gives off a large OM  branch. The OM branch has a totally occluded stent. There was  left-to-right collaterals. Also, the stent of the left circumflex artery  is totally occluded. 4.  The right coronary artery arises normally from the right sinus of  Valsalva. It is a good-sized vessel with mild disease in the mid segment. It is dominant circulation with a grade 3 right-to-left collaterals noted.   IMPRESSION:  1.  Totally occluded OM stent with a totally occluded left circumflex  artery stent with left-to-right collaterals. 2.  Moderate to severe disease involving the distal left main. 3.  Mild disease involving the right coronary artery. RECOMMENDATIONS:  1. Continue medical therapy. 2.  Aggressive coronary artery disease risk-factor modifications. 3.  Smoking cessation. 4.  Evaluation by CT Surgery when the patient is agreeable.       PAST SURGICAL HISTORY:    Past Surgical History:   Procedure Laterality Date    ABDOMEN SURGERY      ABDOMINAL EXPLORATION SURGERY N/A 6-5-16    Excision perforated mid body gastric ulcer, primary closure omentum patch    BACK SURGERY      CARDIAC CATHETERIZATION  05/03/2018    Dr. Lafaye Boeck CATH LAB PROCEDURE  05/03/2018    Dr. Marixa Pedraza multiple PCIs     KNEE ARTHROPLASTY      TRANSESOPHAGEAL ECHOCARDIOGRAM  10/28/2016    Dr. Marixa Pedraza r/o embolic source       HOME MEDICATIONS:  Prior to Admission medications    Medication Sig Start Date End Date Taking?  Authorizing Provider   canagliflozin (INVOKANA) 300 MG TABS tablet Take 300 mg by mouth every morning (before breakfast)   Yes Historical Provider, MD   Biotin 06496 MCG TABS Take by mouth   Yes Historical Provider, MD   zinc 50 MG TABS tablet Take 50 mg by mouth daily   Yes Historical Provider, MD   vitamin B-12 (CYANOCOBALAMIN) 1000 MCG tablet Take 1,000 mcg by mouth daily   Yes Historical Provider, MD   Turmeric (QC TUMERIC COMPLEX) 500 MG CAPS Take by mouth   Yes Historical Provider, MD   Ascorbic Acid (VITAMIN C) 1000 MG tablet Take 1,000 mg by mouth daily   Yes Historical Provider, MD   Fluticasone Propionate (FLONASE ALLERGY RELIEF NA) by Nasal route   Yes Historical Provider, MD   metoprolol succinate (TOPROL XL) 25 MG extended release tablet Take 1 tablet by mouth daily 9/25/20  Yes Naomy Blair MD   furosemide DO, 500 mcg at 01/20/21 0615    ascorbic acid (VITAMIN C) tablet 1,000 mg, 1,000 mg, Oral, Daily, Ismail U Shanda, DO    escitalopram (LEXAPRO) tablet 20 mg, 20 mg, Oral, Daily, Ismail U Sahnda, DO    vitamin B-12 (CYANOCOBALAMIN) tablet 1,000 mcg, 1,000 mcg, Oral, Daily, Ismail U Shanda, DO    zinc sulfate (ZINCATE) capsule 50 mg, 50 mg, Oral, Daily, Ismail U Shanda, DO    traZODone (DESYREL) tablet 50 mg, 50 mg, Oral, Nightly, Ismail U Shanda, DO    piperacillin-tazobactam (ZOSYN) 3,375 mg in dextrose 5 % 50 mL IVPB extended infusion (mini-bag), 3,375 mg, Intravenous, Q8H, Ismail U Shanda, DO, Last Rate: 12.5 mL/hr at 01/20/21 0528, 3,375 mg at 01/20/21 0528    0.9 % sodium chloride infusion, , Intravenous, Q8H, Ismail U Shanda, DO    insulin lispro (HUMALOG) injection vial 0-6 Units, 0-6 Units, Subcutaneous, TID WC, Ismail U Shanda, DO    insulin lispro (HUMALOG) injection vial 0-3 Units, 0-3 Units, Subcutaneous, Nightly, Ismail U Shanda, DO    [Held by provider] aspirin EC tablet 81 mg, 81 mg, Oral, Daily, Ismail U Shanda, DO    atorvastatin (LIPITOR) tablet 40 mg, 40 mg, Oral, Nightly, Ismail U Shanda, DO, 40 mg at 01/20/21 0046    [Held by provider] clopidogrel (PLAVIX) tablet 75 mg, 75 mg, Oral, Daily, Ismail U Shanda, DO    sacubitril-valsartan (ENTRESTO) 24-26 MG per tablet 0.5 tablet, 0.5 tablet, Oral, BID, Ismail U Shanda, DO, 0.5 tablet at 01/20/21 0111    pantoprazole (PROTONIX) tablet 40 mg, 40 mg, Oral, QAM AC, Ismail U Shanda, DO, 40 mg at 01/20/21 4124    metoprolol succinate (TOPROL XL) extended release tablet 25 mg, 25 mg, Oral, Daily, Ismail U Shanda, DO    insulin glargine (LANTUS) injection vial 26 Units, 26 Units, Subcutaneous, BID, Ismail U Shanda, DO    gabapentin (NEURONTIN) capsule 300 mg, 300 mg, Oral, BID, Ismail U Shanda, DO, 300 mg at 01/20/21 0046    furosemide (LASIX) tablet 20 mg, 20 mg, Oral, Daily, Immanuel Fletcher DO    ferrous sulfate (IRON 325) tablet 325 mg, 325 mg, Oral, Daily with breakfast, Pinky Coupe, DO    glucose (GLUTOSE) 40 % oral gel 15 g, 15 g, Oral, PRN, Pinky Coupe, DO    dextrose 50 % IV solution, 12.5 g, Intravenous, PRN, Pinky Coupe, DO    glucagon (rDNA) injection 1 mg, 1 mg, Intramuscular, PRN, Ismail U Shanda, DO    dextrose 5 % solution, 100 mL/hr, Intravenous, PRN, Pinky Coupe, DO    sodium chloride flush 0.9 % injection 10 mL, 10 mL, Intravenous, 2 times per day, Pinky Coupe, DO    sodium chloride flush 0.9 % injection 10 mL, 10 mL, Intravenous, PRN, Pinky Coupe, DO    enoxaparin (LOVENOX) injection 40 mg, 40 mg, Subcutaneous, Daily, Ismail U Shanda, DO    acetaminophen (TYLENOL) tablet 650 mg, 650 mg, Oral, Q6H PRN **OR** acetaminophen (TYLENOL) suppository 650 mg, 650 mg, Rectal, Q6H PRN, Pinky Coupe, DO      ALLERGIES:  Ace inhibitors and Percocet [oxycodone-acetaminophen]    SOCIAL HISTORY:    She is a current tobacco smoker, smokes one to two packs/day. Has smoked for 40+ years. Denies former or current alcohol or illicit drug use. FAMILY HISTORY:   States her father had a history of peripheral vascular disease and diabetes mellitus. Denies any other pertinent cardiac family medical history at this time. REVIEW OF SYSTEMS:     · Constitutional: Denies fevers, chills, night sweats, and generalized fatigue. Denies significant weight loss or weight gain. · HEENT: Denies headaches, nose bleeds, rhinorrhea, sore throat. Denies blurred vision. Denies dysphagia, odynophagia. · Musculoskeletal: Denies falls, muscle weakness. · Neurological: Denies dizziness and lightheadedness, numbness and tingling. Denies focal neurological deficits. · Cardiovascular: +rare palpitations, +RLE edema. Denies chest pain, diaphoresis. Denies near syncope, syncope. Denies PND, orthopnea. · Respiratory: +chronic productive cough, +chronic dyspnea on exertion. Denies hemoptysis.   · Gastrointestinal: Denies abdominal pain, nausea/vomiting, diarrhea and constipation, black/bloody, and tarry stools. · Genitourinary: Denies dysuria and hematuria. · Hematologic: Denies excessive bruising or bleeding. · Endocrine: Denies excessive thirst. Denies intolerance to hot and cold. PHYSICAL EXAM:   BP (!) 115/50   Pulse 78   Temp 98.1 °F (36.7 °C) (Oral)   Resp 16   Ht 5' 9\" (1.753 m)   Wt 245 lb (111.1 kg)   SpO2 95%   BMI 36.18 kg/m²   CONST:  Well developed, obese WF who appears stated age. Awake, alert, cooperative, no apparent distress. HEENT:   Head- Normocephalic, atraumatic. Eyes- Conjunctivae pink, anicteric. Neck-  No stridor, trachea midline, no apparent jugular venous distention. CHEST: Chest symmetrical and non-tender to palpation. No accessory muscle use or intercostal retractions. RESPIRATORY: Lung sounds - clear throughout fields. No wheezing, rales or rhonchi. Diminished. CARDIOVASCULAR:     No noted carotid bruit. Heart Inspection- shows no noted pulsations. Heart Palpation- no heaves or thrills. Heart Ausculation- Regular rate and rhythm, 2/6 systolic murmur LLSB. PV: Pedal pulses palpable, no clubbing or cyanosis. ABDOMEN: Soft, non-tender to light palpation. Bowel sounds present. MS: Good muscle strength and tone. No atrophy or abnormal movements. SKIN: Warm and dry. +mild RLE edema with erythema and wound of second toe of the right foot. +trace LLE edema. NEURO / PSYCH: Oriented to person, place and time. Speech clear and appropriate. Follows all commands. Pleasant affect. DATA:    Telemetry: Not currently on telemetry for review    Diagnostic:  All diagnostic testing and lab work thus far this admission reviewed in detail. Right Foot XRAY 1/19/2021:  Impression  1.  No acute osseous findings about the right foot.  No erosive changes seen. There appears to be some soft tissue irregularity to the dorsal and palmar  aspect of the forefoot on the lateral view.   2. Mild right large toe osteoarthritis.  Calcaneal enthesophytes. 3.  Arteriosclerosis. Right Foot MRI -- pending    Vascular studies -- pending            Intake/Output Summary (Last 24 hours) at 1/20/2021 0833  Last data filed at 1/20/2021 8889  Gross per 24 hour   Intake    Output 700 ml   Net -700 ml       Labs:   CBC:   Recent Labs     01/19/21 2103 01/20/21 0423   WBC 14.4* 8.8   HGB 13.8 12.8   HCT 43.9 40.1    378     BMP:   Recent Labs     01/19/21 2103 01/20/21 0423   * 135   K 4.8 4.4   CO2 21* 23   BUN 32* 31*   CREATININE 1.5* 1.4*   LABGLOM 36 39   CALCIUM 9.6 9.0     Mag:   Recent Labs     01/20/21 0423   MG 1.7     Phos:   Recent Labs     01/20/21 0423   PHOS 4.0     TSH:   Recent Labs     01/20/21 0423   TSH 2.680     HgA1c:   Lab Results   Component Value Date    LABA1C 12.0 (H) 10/25/2016     FASTING LIPID PANEL:  Lab Results   Component Value Date    CHOL 127 01/20/2021    HDL 27 01/20/2021    LDLCALC 53 01/20/2021    TRIG 237 01/20/2021     LIVER PROFILE:  Recent Labs     01/19/21 2103 01/20/21 0423   AST 11 8   ALT 11 8   LABALBU 3.9 3.5         ASSESSMENT:  1. Cardiac risk stratification prior to possible need for surgical intervention of right second toe wound. 2. Right lower extremity cellulitis and possible osteomyelitis of second toe of right foot. S/p I&D within the last week, currently on IV antibiotic therapy. Right foot MRI pending. 3. Coronary artery disease s/p prior PCI and stent placement, with most recent intervention being CABG at [facility] in May 2018 (details unknown and unavailable for review at this time. Denies chest pain. Stable. 4. Chronic HFrEF. Last EF on TTE 30-35% in 2018. Patient is on appropriate GDMT. No symptoms of acute CHF, appears euvolemic (has non-pitting edema of RLE with erythema). 5. Ischemic cardiomyopathy. 6. Moderate TR, mild MR with mild pulmonary hypertension on TTE 2018. 7. Hypertension, well-controlled.   8. Hyperlipidemia, on statin therapy. 9. Obesity. 10. Current tobacco abuse. 11. Chronic kidney disease with ?mild acute kidney injury (creatinine 1.3 to 1.4 in 2018). 12. Diabetes mellitus type II with peripheral neuropathy. 13. History of CVA. 14. Obstructive sleep apnea, compliant with CPAP. 15. Chronic obstructive pulmonary disease. 16. History of perforated gastric ulcer requiring surgical intervention. RECOMMENDATIONS:  1. Consider addition of fenofibrate due to uncontrolled hypertriglyceridemia on lipid panel. 2. Continue current cardiac medications the same at this time, including GDMT for cardiomyopathy. 3. Resume ASA and Plavix once able and okay with others. 4. No need for additional cardiac testing at this time. 5. Cardiac risk stratification to follow as per Dr. Joi Mooney. The above case and recommendations have been discussed with Dr. Joi Mooney. Further recommendations to follow as per him. Kaden Cary, [address] [place] [address] Cardiology    Electronically signed by Julio Cesar Rdz PA-C on 1/20/2021 at 8:33 AM     Addendum:  Maribeth Del Castillo MD    Reason for consult: preop cardiac evalaution    Patient previously known to: Dr. Jh Orozco    History of Present illness: 63 yr old patient with history of coronary artery disease status-post prior PCI with most recent intervention of CABG in 2018 at [facility] (details unknown at this time), chronic heart failure with reduced ejection fraction, ischemic cardiomyopathy, hyperlipidemia, hypertension, diabetes mellitus type II, history of CVA, obesity, tobacco abuse,obstructive sleep apnea compliant with CPAP, chronic obstructive pulmonary disease, valvular heart disease, chronic kidney disease, and history of perforated gastric ulcer status-post surgical repair. Patient presented to [address] [place] [address] on January 19, 2021 due to right second toe wound wound and infection. Cardiology consulted for pre-op cardiac evaluation.  Patient states she had been treated with antibiotics outpatient for right lower extremity cellulitis and ulceration noted on the second toe of her right foot. She states she was seen by her podiatrist approximately one week ago, who referred her to have I&D done the following day. Patient states after the I&D, she was advised to go to the ED for further treatment with IV antibiotics and possible need for further surgical intervention. However, patient waited until yesterday to present to the ED and was treated with PO antibiotics per the patient with no improvement. She is now being evaluated for possible right foot osteomyelitis and need for surgical intervention. Denies any chest pain. She states she has a chronic productive cough and chronic dyspnea on exertion due to her chronic obstructive pulmonary disease, which is currently at her normal baseline. She denies any dyspnea at rest, or home supplemental oxygen use. She admits to rare palpitations, as well as bilateral lower extremity edema when she does not take her diuretic therapy regularly. She states she has had mild worsening edema of the RLE as of recently with associated erythema. She denies any chest discomfort at rest or on exertion, nausea, vomiting, diaphoresis, dizziness, near-syncope or syncope. She denies any significant weight gain, paroxysmal nocturnal dyspnea or orthopnea. She denies subjective fever, chills. She admits to medication compliance regarding most of her cardiac medications, but does admit at times to missing a few doses of Lasix. Review of systems:  Review of 10 systems negative except as mentioned in the HPI    Medical History: Reviewed    Surgical history: Reviewed    FamilyHistory: Reviewed    Allergies:  Reviewed    Social Hx: Reviewed.     Scheduled Meds:   Arformoterol Tartrate  15 mcg Nebulization BID    And    budesonide  0.5 mg Nebulization BID    vitamin C  1,000 mg Oral Daily    escitalopram  20 mg Oral Daily    vitamin B-12 1,000 mcg Oral Daily    zinc sulfate  50 mg Oral Daily    traZODone  50 mg Oral Nightly    insulin lispro  0-6 Units Subcutaneous TID WC    insulin lispro  0-3 Units Subcutaneous Nightly    vancomycin  1,750 mg Intravenous Q24H    cefepime  2,000 mg Intravenous Q12H    [Held by provider] aspirin  81 mg Oral Daily    atorvastatin  40 mg Oral Nightly    [Held by provider] clopidogrel  75 mg Oral Daily    sacubitril-valsartan  0.5 tablet Oral BID    pantoprazole  40 mg Oral QAM AC    metoprolol succinate  25 mg Oral Daily    insulin glargine  26 Units Subcutaneous BID    gabapentin  300 mg Oral BID    furosemide  20 mg Oral Daily    ferrous sulfate  325 mg Oral Daily with breakfast    sodium chloride flush  10 mL Intravenous 2 times per day    enoxaparin  40 mg Subcutaneous Daily     Continuous Infusions:   sodium chloride      dextrose       PRN Meds:.glucose, dextrose, glucagon (rDNA), dextrose, sodium chloride flush, acetaminophen **OR** acetaminophen      Labs/imaging studies: Reviewed. Above DAREN exam, assessment reviewed and reflect my work. I personally saw, examined, and evaluated the patient today. I personally reviewed the medications, rhythm strips, pertinent labs and test reports. I directly participated in the medical-decision making, ordering pertinent tests and medication adjustment. Physical exam:     Vitals:    01/20/21 0524   BP: (!) 115/50   Pulse: 78   Resp: 16   Temp: 98.1 °F (36.7 °C)   SpO2: 95%       In general, this is a well developed, well nourished who appears stated age. awake, alert, cooperative, no apparent distress    HEENT: eyes -conjunctivae pink,   Neck-  no stridor, no carotid bruit. no jugular venous distention   RESPIRATORY: Chest symmetrical and non-tender to palpation. No accessory muscles use.    Lung auscultation - clear to auscultation except few rhonchi  CARDIOVASCULAR:     Heart Inspection shows no noted pulsations  Heart Palpation - no palpable thrills  Heart Ausculation - Regular rate and rhythm, 1/6 systolic murmur, No s3 or rub.   + lower extremity edema, no varicosities. Left distal pulses palpable, Right foot warm, right second toe wound. ABDOMEN: Soft, nontender,  Bowel sounds present. MS: n/a. : Deferred  Rectal Exam: Deferred  SKIN: warm and dry  NEURO / PSYCH: oriented to person, place        Impression/Recommendations:    Preoperative Cardiac risk stratification - denies CP, no actue CHf  - Cleared, from cardiac stand point, for her low risk surgical procedure. No need for further cardiac testing at this time. Right lower extremity cellulitis and possible osteomyelitis of second toe of right foot. S/p I&D within the last week, currently on IV antibiotic therapy. Right foot MRI pending. Coronary artery disease s/p prior PCI and stent placement s/p CABG at [facility] in May 2018 Denies chest pain. Stable. Chronic HFrEF - Last EF on TTE 30-35% in 2018. Patient is on appropriate GDMT and stable without any ventricular arrhythmias. No symptoms of acute CHF, appears euvolemic (has non-pitting edema of RLE with erythema). Ischemic cardiomyopathy. Moderate TR, mild MR with mild pulmonary hypertension on TTE 2018. Hypertension, well-controlled. Hyperlipidemia, on statin therapy. Obesity. Current tobacco abuse - Counseled to quit smoking     Chronic kidney disease with ?mild acute kidney injury (creatinine 1.3 to 1.4 in 2018). Diabetes mellitus type II with peripheral neuropathy. History of CVA. Obstructive sleep apnea, compliant with CPAP. Chronic obstructive pulmonary disease. Resume ASA and Plavix once able and okay with others. Above recommendations discussed with her. Cardiology will see as needed    Thank you for the consult.         Bernie Trammell MD  1/20/2021  11:04 AM  [facility]) Cardiology

## 2021-01-20 NOTE — ED PROVIDER NOTES
ED Attending  CC: No                                                                                                                                    Department of Emergency Medicine   ED  Provider Note  Admit Date/RoomTime: 1/19/2021  8:10 PM  ED Room: 05/05        HPI:  1/19/21,   Time: 8:26 PM DEE DEE Nicholas is a 63 y.o. female presenting to the ED for open wound right second toe with redness, beginning 1 week ago. The complaint has been persistent, moderate in severity, and worsened by nothing. The patient presents to the emergency room after being seen today by her podiatrist.  She states that she has a history of type 2 diabetes and has had nonhealing wounds on the right foot in the past.  She states that about a week ago the wound opened up on the plantar and dorsal surface of her right second toe. She states that she thinks it started because the toe rubs along the ground in her shoes when she walks. There was no fall or direct trauma. Patient states that the blister developed and opened up and she has since developed expanding redness and pain at the site. She was seen last Thursday by her podiatrist who put her on Keflex and doxycycline. Patient states that she is kept an eye on things and became concerned when there was no improvement. She feels that the redness is getting worse. She states that there is drainage from the wounds and that the open areas seem to be getting larger. The patient denies any fever, chills, vomiting. She reports some chronic decreased sensation on the right side from an old stroke. States her podiatrist does not come to this facility but is requesting Dr. Baldo Moreno.           ROS:     Constitutional: Negative for fever and chills  HENT: Negative for ear pain, sore throat and sinus pressure  Eyes: Negative for pain, discharge and redness  Respiratory:  Negative for shortness of breath, cough and wheezing  Cardiovascular: Negative for CP, edema or palpitations  Gastrointestinal: Negative for nausea, vomiting, diarrhea and abdominal distention  Genitourinary: Negative for dysuria and frequency  Musculoskeletal:  See above  Skin: See above  Neurological: Negative for weakness and headaches  Hematological: Negative for adenopathy    All other systems reviewed and are negative      -------------------------------- PAST HISTORY ----------------------------------  Past Medical History:  has a past medical history of Anxiety, CAD (coronary artery disease), Cardiomyopathy ([facility] [address] Cerebral artery occlusion with cerebral infarction ([facility] [address] COPD (chronic obstructive pulmonary disease) ([facility] [address] Depression, Diabetes mellitus ([facility] [address] Hx of cardiovascular stress test, Hyperlipidemia, Nonrheumatic mitral (valve) insufficiency, Nonrheumatic tricuspid (valve) insufficiency, Sleep apnea, and Smoker. Past Surgical History:  has a past surgical history that includes Coronary angioplasty with stent; Abdomen surgery; Abdominal exploration surgery (N/A, 6-5-16); transesophageal echocardiogram (10/28/2016); Cardiac catheterization (05/03/2018); Diagnostic Cardiac Cath Lab Procedure (05/03/2018); and Coronary artery bypass graft. Social History:  reports that she has been smoking. She has been smoking about 1.00 pack per day. She has never used smokeless tobacco. She reports that she does not drink alcohol or use drugs. Family History: family history includes Heart Attack in her father. The patients home medications have been reviewed.     Allergies: Ace inhibitors and Percocet [oxycodone-acetaminophen]    --------------------------------- RESULTS ------------------------------------------  All laboratory and radiology results have been personally reviewed by myself   LABS:  Results for orders placed or performed during the hospital encounter of 01/19/21   CBC Auto Differential   Result Value Ref Range    WBC 14.4 (H) 4.5 - 11.5 E9/L    RBC 4.92 3.50 - 5.50 E12/L    Hemoglobin 13.8 11.5 - 15.5 g/dL    Hematocrit 43.9 34.0 - 48.0 %    MCV 89.2 80.0 - 99.9 fL    MCH 28.0 26.0 - 35.0 pg    MCHC 31.4 (L) 32.0 - 34.5 %    RDW 13.7 11.5 - 15.0 fL    Platelets 005 522 - 380 E9/L    MPV 9.5 7.0 - 12.0 fL    Neutrophils % 75.3 43.0 - 80.0 %    Immature Granulocytes % 0.3 0.0 - 5.0 %    Lymphocytes % 15.9 (L) 20.0 - 42.0 %    Monocytes % 6.1 2.0 - 12.0 %    Eosinophils % 1.9 0.0 - 6.0 %    Basophils % 0.5 0.0 - 2.0 %    Neutrophils Absolute 10.83 (H) 1.80 - 7.30 E9/L    Immature Granulocytes # 0.05 E9/L    Lymphocytes Absolute 2.29 1.50 - 4.00 E9/L    Monocytes Absolute 0.88 0.10 - 0.95 E9/L    Eosinophils Absolute 0.27 0.05 - 0.50 E9/L    Basophils Absolute 0.07 0.00 - 0.20 E9/L   Comprehensive Metabolic Panel   Result Value Ref Range    Sodium 131 (L) 132 - 146 mmol/L    Potassium 4.8 3.5 - 5.0 mmol/L    Chloride 96 (L) 98 - 107 mmol/L    CO2 21 (L) 22 - 29 mmol/L    Anion Gap 14 7 - 16 mmol/L    Glucose 161 (H) 74 - 99 mg/dL    BUN 32 (H) 6 - 20 mg/dL    CREATININE 1.5 (H) 0.5 - 1.0 mg/dL    GFR Non-African American 36 >=60 mL/min/1.73    GFR African American 43     Calcium 9.6 8.6 - 10.2 mg/dL    Total Protein 8.4 (H) 6.4 - 8.3 g/dL    Alb 3.9 3.5 - 5.2 g/dL    Total Bilirubin 0.2 0.0 - 1.2 mg/dL    Alkaline Phosphatase 119 (H) 35 - 104 U/L    ALT 11 0 - 32 U/L    AST 11 0 - 31 U/L   Lactic Acid, Plasma   Result Value Ref Range    Lactic Acid 2.5 (H) 0.5 - 2.2 mmol/L       RADIOLOGY:  Interpreted by Radiologist.  XR FOOT RIGHT (MIN 3 VIEWS)    (Results Pending)       ----------------- NURSING NOTES AND VITALS REVIEWED ---------------   The nursing notes within the ED encounter and vital signs as below have been reviewed.    BP (!) 146/69   Pulse 94   Temp 98.1 °F (36.7 °C) (Oral)   Resp 16   Ht 5' 9\" (1.753 m)   Wt 245 lb (111.1 kg)   SpO2 96%   BMI 36.18 kg/m²   Oxygen Saturation Interpretation: Normal      --------------------------------PHYSICAL EXAM------------------------------------      Constitutional/General: Alert and oriented x3, well appearing, non toxic in NAD  Head: NC/AT  Eyes: PERRL, EOMI  Mouth: Oropharynx clear, handling secretions, no trismus  Neck: Supple, full ROM, no meningeal signs  Pulmonary: Lungs clear to auscultation bilaterally, no wheezes, rales, or rhonchi. Not in respiratory distress  Cardiovascular:  Regular rate and rhythm, no murmurs, gallops, or rubs. 2+ distal pulses  Abdomen: Soft, + BS. No distension. Nontender. No palpable rigidity, rebound or guarding  Extremities: Moves all extremities x 4. See pictures. There is increased redness and warmth over right lower leg and foot. No current discharge but wounds are moist.     Skin:  See pictures. Neurologic: GCS 15,  Intact. No focal deficits  Psych: Normal Affect                          ------------------------ ED COURSE/MEDICAL DECISION MAKING----------------------  Medications   0.9 % sodium chloride bolus (has no administration in time range)   vancomycin 2 g in dextrose 5% 500 ml IVPB (has no administration in time range)   piperacillin-tazobactam (ZOSYN) 3.375 g in dextrose 5 % 50 mL IVPB extended infusion (mini-bag) (3.375 g Intravenous New Bag 1/19/21 2147)         Medical Decision Making:    WBC is elevated. Blood cultures sent and pending. Given Vanc and Zosyn here. Will discuss admission with medicine, Dr. Haven Young for Venkat Umana/Jared. Call out to Dr. Manjit Frank as well. Pt aware and agreeable to plan       Counseling: The emergency provider has spoken with the patient and discussed todays results, in addition to providing specific details for the plan of care and counseling regarding the diagnosis and prognosis. Questions are answered at this time and they are agreeable with the plan.      ------------------------ IMPRESSION AND DISPOSITION -------------------------------    IMPRESSION  1.  Diabetic ulcer of toe of right foot associated with type 2 diabetes mellitus, unspecified ulcer stage ([facility] [address]    2.  Cellulitis of right foot        DISPOSITION  Disposition: Discharge to home  Patient condition is stable                   Homero Cazares PA-C  01/19/21 1057

## 2021-01-20 NOTE — PROGRESS NOTES
Internal Medicine Progress Note    [facility]    Arely Conley. Marika Erickson., F.A.C.ONoeI. Eren Cornell D.O., BRENNONOMARLON Ivory D.O. Rajni Way, MSN, APRN, NP-C  Bong Valiente. Jevon Cardoso, MSN, APRN-CNP     Primary Care Physician: Justa Britton MD   Admitting Physician:  Genny Andres DO  Admission date and time: 1/19/2021  8:10 PM    Room:  [facility]7-  Admitting diagnosis: Cellulitis of foot [L03.119]    Patient Name: Bre Gutierrez  MRN: [id number]    Date of Service: 1/20/2021     Subjective:  Mcihael Ventura is a 63 y.o. female who was seen and examined today,1/20/2021, at the bedside. Michael Ventura states she is already feeling better in regards to the foot infection. We discussed further evaluation of the infectious process with podiatry consultation, arterial studies and MRI of the foot which she is agreeable with. She has extensive cardiac history and, although presently asymptomatic, we have discussed cardiology consultation for preoperative clearance as well and she is agreeable. No family present during my examination. Review of System:   Constitutional:   Denies fever or chills, weight loss or gain, admits to fatigue/malaise  HEENT:   Denies ear pain, sore throat, sinus or eye problems. Cardiovascular:   Denies any chest pain, or palpitations. Admits to extensive prior cardiac history with no recent intervention. Respiratory:   Denies shortness of breath, coughing, sputum production, hemoptysis, or wheezing. Gastrointestinal:   Denies nausea, vomiting, diarrhea, or constipation. Denies any abdominal pain. Genitourinary:    Denies any urgency, frequency, hematuria. Voiding  without difficulty. Extremities:   Denies lower extremity swelling, edema or cyanosis. Neurology:    Denies any headache or focal neurological deficits, Denies generalized weakness or memory difficulty. Psch:   Denies being anxious or depressed.   Musculoskeletal:    Denies  myalgias, joint complaints or back pain. Integumentary:   Admits to painful  duration was the right foot second digit toe tip and dorsum with erythema extending up the foot and leg. Denies bruising. Hematologic/Lymphatic:  Denies bruising or bleeding. Physical Exam:  No intake/output data recorded. Intake/Output Summary (Last 24 hours) at 1/20/2021 0711  Last data filed at 1/20/2021 8543  Gross per 24 hour   Intake    Output 700 ml   Net -700 ml   I/O last 3 completed shifts:  In: -   Out: 700 [Urine:700]  Patient Vitals for the past 96 hrs (Last 3 readings):   Weight   01/19/21 2358 245 lb (111.1 kg)   01/19/21 1933 245 lb (111.1 kg)     Vital Signs:   Blood pressure (!) 115/50, pulse 78, temperature 98.1 °F (36.7 °C), temperature source Oral, resp. rate 16, height 5' 9\" (1.753 m), weight 245 lb (111.1 kg), SpO2 95 %. General appearance:  Alert, responsive, oriented to person, place, and time. Comfortable appearing, no distress. Head:  Normocephalic. No masses, lesions or tenderness. Eyes:  PERRLA. EOMI. Sclera clear. Buccal mucosa moist.  ENT:  Ears normal. Mucosa normal.  Neck:    Supple. Trachea midline. No thyromegaly. No JVD. No bruits. Heart:    Rhythm regular. Rate controlled. Systolic murmur. Lungs:    Midline prior sternotomy incision scar noted. Symmetrical.  Diminished bibasilar. Lungs clear to auscultation bilaterally. No wheezes. No rhonchi. No rales. Abdomen:   Soft. Obese. Non-tender. Non-distended. Bowel sounds positive. No organomegaly or masses. No pain on palpation. Extremities:    Peripheral pulses present, 2+ BUE 1-2+ BLE. No peripheral edema. No cyanosis. No clubbing. Neurologic:    Alert x 3. No focal deficit. Cranial nerves grossly intact. No focal weakness. Psych:   Behavior is normal. Mood appears normal. Speech is not rapid and/or pressured. Musculoskeletal:   Spine ROM normal. Muscular strength intact. Gait not assessed.   Integumentary:  The right foot and the right lower extremity below the knee are erythematous, right lower extremity is circumferential erythema with warmth and tenderness. The right foot second digit has a blackened ulceration on the distal phalanx tip and on the dorsal aspect of the second digit itself with circumferential erythema and fusiform enlargement raising further concern for osteomyelitis. No rashes  Skin normal color and texture.   Genitalia/Breast:  Deferred    Medication:  Scheduled Meds:   Arformoterol Tartrate  15 mcg Nebulization BID    And    budesonide  0.5 mg Nebulization BID    vitamin C  1,000 mg Oral Daily    escitalopram  20 mg Oral Daily    vitamin B-12  1,000 mcg Oral Daily    zinc sulfate  50 mg Oral Daily    traZODone  50 mg Oral Nightly    piperacillin-tazobactam  3,375 mg Intravenous Q8H    insulin lispro  0-6 Units Subcutaneous TID WC    insulin lispro  0-3 Units Subcutaneous Nightly    [Held by provider] aspirin  81 mg Oral Daily    atorvastatin  40 mg Oral Nightly    [Held by provider] clopidogrel  75 mg Oral Daily    sacubitril-valsartan  0.5 tablet Oral BID    pantoprazole  40 mg Oral QAM AC    metoprolol succinate  25 mg Oral Daily    insulin glargine  26 Units Subcutaneous BID    gabapentin  300 mg Oral BID    furosemide  20 mg Oral Daily    ferrous sulfate  325 mg Oral Daily with breakfast    sodium chloride flush  10 mL Intravenous 2 times per day    enoxaparin  40 mg Subcutaneous Daily     Continuous Infusions:   sodium chloride      dextrose         Objective Data:  CBC with Differential:    Lab Results   Component Value Date    WBC 8.8 01/20/2021    RBC 4.63 01/20/2021    HGB 12.8 01/20/2021    HCT 40.1 01/20/2021     01/20/2021    MCV 86.6 01/20/2021    MCH 27.6 01/20/2021    MCHC 31.9 01/20/2021    RDW 13.4 01/20/2021    SEGSPCT 61 03/15/2014    BANDSPCT 5 06/06/2016    METASPCT 1 06/11/2016    LYMPHOPCT 23.1 01/20/2021    MONOPCT 8.3 01/20/2021    BASOPCT 0.8 01/20/2021 MONOSABS 0.73 01/20/2021    LYMPHSABS 2.03 01/20/2021    EOSABS 0.19 01/20/2021    BASOSABS 0.07 01/20/2021     BMP:    Lab Results   Component Value Date     01/20/2021    K 4.4 01/20/2021     01/20/2021    CO2 23 01/20/2021    BUN 31 01/20/2021    LABALBU 3.5 01/20/2021    LABALBU 4.2 06/17/2011    CREATININE 1.4 01/20/2021    CALCIUM 9.0 01/20/2021    GFRAA 47 01/20/2021    LABGLOM 39 01/20/2021    GLUCOSE 173 01/20/2021    GLUCOSE 179 06/17/2011     FLP:    Lab Results   Component Value Date    TRIG 237 01/20/2021    HDL 27 01/20/2021    LDLCALC 53 01/20/2021    LABVLDL 47 01/20/2021       Wound Documentation:   Wound 04/27/15 Other (Comment) Foot Right #1 rt bottom foot aqc 2/27/15 Pressure stage 2: now diabetic non healing wound [place] 2 july 21 , 2015 (Active)   Number of days: 2094       Incision 06/05/16 Abdomen (Active)   Number of days: 1793       Wound 01/19/21 Toe (Comment  which one) Anterior;Right 2nd toe posterior (Active)   Wound Length (cm) 1.5 cm 01/19/21 2345   Wound Width (cm) 1.5 cm 01/19/21 2345   Wound Surface Area (cm^2) 2.25 cm^2 01/19/21 2345   Wound Assessment Pink/red; Other (Comment) 01/19/21 2345   Drainage Amount Scant 01/19/21 2345   Drainage Description Thin;Serous 01/19/21 2345   Odor None 01/19/21 2345   Yvonne-wound Assessment Other (Comment) 01/19/21 2345   Number of days: 0       Wound 01/19/21 Toe (Comment  which one) Right 2nd toe anterior (Active)   Wound Length (cm) 1 cm 01/19/21 2345   Wound Width (cm) 1 cm 01/19/21 2345   Wound Surface Area (cm^2) 1 cm^2 01/19/21 2345   Wound Assessment Pink/red; Other (Comment) 01/19/21 2345   Drainage Amount Scant 01/19/21 2345   Drainage Description Serous 01/19/21 2345   Odor None 01/19/21 2345   Yvonne-wound Assessment Other (Comment) 01/19/21 2345   Number of days: 0       Assessment:  1. Right diabetic foot infection and cellulitis  2. Lactic acidosis  3. CKD stage III  4. Insulin dependent diabetes mellitus  5.  Coronary artery disease status post stent and CABG  6. Systolic congestive heart failure EF of 30 to 35% with stage I diastolic dysfunction  7. Mitral and tricuspid regurgitation  8. Severe apnea on CPAP  9. COPD  10. History of CVA with right-sided deficits and neuropathy  11. Hyperlipidemia  12. Depression  13. Current tobacco abuse    Plan:   [facility] was admitted due to diabetic foot ulceration and cellulitis with concern for osteomyelitis and the potential need for surgical treatment. Empiric IV antibiotics are in place. Given the patient's atherosclerotic disease history and cardiac disease history further arterial studies will be performed prior to the procedure. We will also perform MRI to evaluate for osteomyelitis. Antiplatelet therapy will be temporarily held in the perioperative phase and will be resumed once intervention has been completed. Cardiac clearance will need to be obtained prior to procedure. We will continue with symptomatic and supportive care. Lipid panel and hemoglobin A1c assessed for medical optimization purposes. Continue current therapy. See orders for further plan of care. More than 50% of my time was spent at the bedside counseling/coordinating care with the patient and/or family with face to face contact. This time was spent reviewing notes and laboratory data as well as instructing and counseling the patient. Time I spent with the family or surrogate(s) is included only if the patient was incapable of providing the necessary information or participating in medical decisions. I also discussed the differential diagnosis and all of the proposed management plans with the patient and individuals accompanying the patient. I am readily available for any further decision-making and intervention.        DAVID Coyle CNP  1/20/2021  7:11 AM

## 2021-01-20 NOTE — H&P
Department of Internal Medicine  History and Physical    PCP: Dr. Ashley Hopkins  Admitting Physician: Dr. Anna Mahajan  Consultants: Dr. Torres Phillips and Dr. Jaun Christianson:  DFU and cellulitis     HISTORY OF PRESENT ILLNESS:    Patient is 14-year-old female presented to the ED due to right foot diabetic ulcer and cellulitis. She states it developed within the last week. She was placed on antibiotics however it progressed and she was sent to the ED by her podiatrist for IV antibiotics and possible debridement. Patient states that she normally walks on her toes and she has a. Peripheral neuropathy on the right side. She states she first noticed blister about 4 to 5 days ago. From there erythema and swelling has progressed to above her ankle. She denies any systemic symptoms.     PAST MEDICAL Hx:  Past Medical History:   Diagnosis Date    Anxiety     CAD (coronary artery disease)     x5 stents    Cardiomyopathy ([address]     Cerebral artery occlusion with cerebral infarction ([address]     COPD (chronic obstructive pulmonary disease) ([address]     Depression     Diabetes mellitus ([address]     IDDM    Hx of cardiovascular stress test 3/22/2016    Lexiscan    Hyperlipidemia     Nonrheumatic mitral (valve) insufficiency     Nonrheumatic tricuspid (valve) insufficiency     Sleep apnea     cpap    Smoker     current 1PPD X35yrs       PAST SURGICAL Hx:   Past Surgical History:   Procedure Laterality Date    ABDOMEN SURGERY      ABDOMINAL EXPLORATION SURGERY N/A 6-5-16    Excision perforated mid body gastric ulcer, primary closure omentum patch    CARDIAC CATHETERIZATION  05/03/2018    Dr. Jesus Alberto Burton CATH LAB PROCEDURE  05/03/2018    Dr. Giorgio Unger multiple PCIs     TRANSESOPHAGEAL ECHOCARDIOGRAM  10/28/2016    Dr. Giorgio Unger r/o embolic source       FAMILY Hx:  Family History   Problem Relation Age of Onset    Heart Attack Father HOME MEDICATIONS:  Prior to Admission medications    Medication Sig Start Date End Date Taking? Authorizing Provider   metoprolol succinate (TOPROL XL) 25 MG extended release tablet Take 1 tablet by mouth daily 9/25/20   Lakshmi Talyor MD   furosemide (LASIX) 20 MG tablet Take 1 tablet by mouth daily 9/25/20   Lakshmi Taylor MD   sacubitril-valsartan (ENTRESTO) 24-26 MG per tablet Take 0.5 tablets by mouth 2 times daily Take a half of a tablet BID 4/2/20   Lakshmi Taylor MD   Cholecalciferol (VITAMIN D) 125 MCG (5000 UT) CAPS Take 5,000 Units by mouth    Historical Provider, MD   insulin glargine (BASAGLAR KWIKPEN) 100 UNIT/ML injection pen Inject 26 Units into the skin 2 times daily 26 am   30 units in pm    Historical Provider, MD   acetaminophen (TYLENOL) 325 MG tablet Take 650 mg by mouth 2 times daily as needed for Pain    Historical Provider, MD   pantoprazole (PROTONIX) 40 MG tablet  6/7/17   Historical Provider, MD   atorvastatin (LIPITOR) 40 MG tablet  6/7/17   Historical Provider, MD   escitalopram (LEXAPRO) 20 MG tablet  6/7/17   Historical Provider, MD   ferrous sulfate 325 (65 Fe) MG tablet  6/7/17   Historical Provider, MD Farrell Flatearl 200-25 MCG/INH AEPB  6/9/17   Historical Provider, MD   traZODone (DESYREL) 50 MG tablet Take 50 mg by mouth nightly    Historical Provider, MD   gabapentin (NEURONTIN) 300 MG capsule Take 300 mg by mouth 2 times daily. Juan J Breaux     Historical Provider, MD   clopidogrel (PLAVIX) 75 MG tablet Take 75 mg by mouth daily    Historical Provider, MD   albuterol sulfate  (90 BASE) MCG/ACT inhaler Inhale 2 puffs into the lungs every 6 hours as needed for Wheezing    Historical Provider, MD   aspirin 81 MG EC tablet Take 1 tablet by mouth daily 10/28/16   Beba Bennett, MD   metFORMIN (GLUCOPHAGE) 1000 MG tablet Take 1 tablet by mouth 2 times daily (with meals) 10/28/16   Beba Bennett, MD       ALLERGIES:  Ace inhibitors and Percocet [oxycodone-acetaminophen]    SOCIAL Hx:  Social History     Socioeconomic History    Marital status:      Spouse name: Not on file    Number of children: Not on file    Years of education: Not on file    Highest education level: Not on file   Occupational History    Not on file   Social Needs    Financial resource strain: Not on file    Food insecurity     Worry: Not on file     Inability: Not on file    Transportation needs     Medical: Not on file     Non-medical: Not on file   Tobacco Use    Smoking status: Current Every Day Smoker     Packs/day: 1.00    Smokeless tobacco: Never Used   Substance and Sexual Activity    Alcohol use: No     Comment: 2 cups of coffee a day     Drug use: No    Sexual activity: Not Currently   Lifestyle    Physical activity     Days per week: Not on file     Minutes per session: Not on file    Stress: Not on file   Relationships    Social connections     Talks on phone: Not on file     Gets together: Not on file     Attends Alevism service: Not on file     Active member of club or organization: Not on file     Attends meetings of clubs or organizations: Not on file     Relationship status: Not on file    Intimate partner violence     Fear of current or ex partner: Not on file     Emotionally abused: Not on file     Physically abused: Not on file     Forced sexual activity: Not on file   Other Topics Concern    Not on file   Social History Narrative    Not on file       ROS: Positive in bold  General:   Denies chills, fatigue, fever, malaise, night sweats or weight loss    Psychological:   Denies anxiety, disorientation or hallucinations    ENT:    Denies epistaxis, headaches, vertigo or visual changes    Cardiovascular:   Denies any chest pain, irregular heartbeats, or palpitations. No paroxysmal nocturnal dyspnea. Respiratory:   Denies shortness of breath, coughing, sputum production, hemoptysis, or wheezing. No orthopnea.     Gastrointestinal:   Denies II-XII are grossly intact. Motor is 5 out of 5 bilaterally. SKIN:    No bruising or bleeding. No redness, warmth, or swelling    EXTREMITIES:    Peripheral pulses present. No edema, cyanosis, or swelling. Patient has referred ulcer along with erythema that progresses up the ankle  to the shin    OSTEOPATHIC:    Examined in seated and supine positions. Normal thoracic kyphosis and lumbar lordosis. No acute somatic dysfunction. LINES/CATHETERS     LABORATORY DATA:  CBC with Differential:    Lab Results   Component Value Date    WBC 14.4 01/19/2021    RBC 4.92 01/19/2021    HGB 13.8 01/19/2021    HCT 43.9 01/19/2021     01/19/2021    MCV 89.2 01/19/2021    MCH 28.0 01/19/2021    MCHC 31.4 01/19/2021    RDW 13.7 01/19/2021    SEGSPCT 61 03/15/2014    BANDSPCT 5 06/06/2016    METASPCT 1 06/11/2016    LYMPHOPCT 15.9 01/19/2021    MONOPCT 6.1 01/19/2021    BASOPCT 0.5 01/19/2021    MONOSABS 0.88 01/19/2021    LYMPHSABS 2.29 01/19/2021    EOSABS 0.27 01/19/2021    BASOSABS 0.07 01/19/2021     CMP:    Lab Results   Component Value Date     01/19/2021    K 4.8 01/19/2021    CL 96 01/19/2021    CO2 21 01/19/2021    BUN 32 01/19/2021    CREATININE 1.5 01/19/2021    GFRAA 43 01/19/2021    LABGLOM 36 01/19/2021    GLUCOSE 161 01/19/2021    GLUCOSE 179 06/17/2011    PROT 8.4 01/19/2021    LABALBU 3.9 01/19/2021    LABALBU 4.2 06/17/2011    CALCIUM 9.6 01/19/2021    BILITOT 0.2 01/19/2021    ALKPHOS 119 01/19/2021    AST 11 01/19/2021    ALT 11 01/19/2021       ASSESSMENT/PLAN:  1. Right diabetic foot infection and cellulitis  2. Lactic acidosis  3. CKD stage III  4. Insulin dependent diabetes mellitus  5. Coronary artery disease status post stent and CABG  6. Systolic congestive heart failure EF of 30 to 35% with stage I diastolic dysfunction  7. Mitral and tricuspid regurgitation  8. Severe apnea on CPAP  9. COPD  10.  History of CVA with right-sided deficits and neuropathy  11. Hyperlipidemia  12. Depression  13. Current tobacco abuse    Patient sent by her podiatrist to the hospital for antibiotics and possible debridement. We will continue vancomycin and Zosyn. Cultures ordered. Podiatry has been consulted. ID consulted.       Stefanie Rodriguez D.O.  10:21 PM  1/19/2021    Electronically signed by Stefanie Rodriguez DO on 1/19/21 at 10:21 PM EST

## 2021-01-20 NOTE — PROGRESS NOTES
Pharmacy Consultation Note  (Antibiotic Dosing and Monitoring)    Initial consult date: 1/20/2021  Consulting physician: Dr. Eustacio Leventhal  Drug(s): Vancomycin  Indication: Diabetic foot infection    Ht Readings from Last 1 Encounters:   01/19/21 5' 9\" (1.753 m)     Wt Readings from Last 1 Encounters:   01/19/21 245 lb (111.1 kg)     Age/  Gender IBW DW  Allergy Information   63 y.o.   female 66.2 kg 111.1 kg  Ace inhibitors and Percocet [oxycodone-acetaminophen]          Date  Tmax WBC BUN/CR UOP  (mL/kg/hr) Drug/Dose Time   Given Level(s)   (Time) Comments   1/19  (#1) afebrile 14.1 32/1.5 -- Vancomycin 2000 mg IV x 1 2225 1/20  (#2) afebrile 8.8 31/1.4 -- Vancomycin 1750 mg IV q24hr <2230>       (#3)             (#4)             (#5)             (#6)             (#7)             Estimated Creatinine Clearance: 60 mL/min (A) (based on SCr of 1.4 mg/dL (H)). UOP over the past 24 hours:       Intake/Output Summary (Last 24 hours) at 1/20/2021 0841  Last data filed at 1/20/2021 7370  Gross per 24 hour   Intake    Output 700 ml   Net -700 ml     Other anti-infectives: Anti-infective Dose Date Initiated Date Stopped   Pip/tazo 3.375g IV q8hr 1/20      Cultures:  available culture and sensitivity results were reviewed in EPIC  Cultures sent and are pending. Culture Date Result    Blood cx 1 1/19    Blood cx 2 1/19    Wound cx  (R foot) 1/20                     Assessment:  · Consulted by Dr. Eustacio Leventhal to dose/monitor vancomycin  · Goal trough level:  15-20 mcg/mL  · Pt is a 63 yoF who presented from home with R diabetic foot infection and cellulitis. MRI ordered to rule out osteomyelitis. · Serum creatinine today: 1.4 mg/dL; CrCl ~ 60 mL/min; baseline Scr unknown (was 0.8 - 1 in 2016).     Plan:  · Vancomycin 1750 mg IV q24hr  · Level prior to 4th dose  · Follow renal function  · Pharmacist will follow and monitor/adjust dosing as necessary      Thank you for the consult,    Rogelio Bush, PharmD, BCPS 1/20/2021 8:41 AM Ext: [id number]

## 2021-01-20 NOTE — CONSULTS
Podiatry Resident Consult Note    SUBJECTIVE:   This is a 63 y.o. female who was admitted to the hospital for cellulitis of foot and seen bedside for same problem. Significant history listed below    Patient chief complaint is red swollen right second toe. Patient states over the past several months she developed a wound on the right second digit. She states it has gotten worse over time and made her seek treatment today. Patient states she follows with Dr. Kota Gaspar. She states he debrided a right second digit wound last week and gave her oral antibiotics, including doxycycline and Keflex. Patient denies noting any drainage or malodor. She states it is not painful at this time as she does not normally have much feeling remaining in her feet. Patient states no aggrevating factors and no alleviating factors. Patient admits to local care, oral antibiotics, and regular follow-up before coming to the hospital.    Patient follows with podiatrist, Dr. Kota Gaspar. Patient denies fever, chills, nausea, vomiting, shortness of breath, and chest pain.     Past Medical History:   Diagnosis Date    Anxiety     CAD (coronary artery disease)     x5 stents    Cardiomyopathy ([address]     Cerebral artery occlusion with cerebral infarction ([address]     COPD (chronic obstructive pulmonary disease) ([address]     Depression     Diabetes mellitus ([address]     IDDM    Hx of cardiovascular stress test 3/22/2016    Lexiscan    Hyperlipidemia     Nonrheumatic mitral (valve) insufficiency     Nonrheumatic tricuspid (valve) insufficiency     Sleep apnea     cpap    Smoker     current 1PPD X35yrs        Past Surgical History:   Procedure Laterality Date    ABDOMEN SURGERY      ABDOMINAL EXPLORATION SURGERY N/A 6-5-16    Excision perforated mid body gastric ulcer, primary closure omentum patch    BACK SURGERY      CARDIAC CATHETERIZATION  05/03/2018    Dr. Johanna Donovan  CORONARY ARTERY BYPASS GRAFT      DIAGNOSTIC CARDIAC CATH LAB PROCEDURE  05/03/2018    Dr. Vishal Gonzales multiple PCIs     KNEE ARTHROPLASTY      TRANSESOPHAGEAL ECHOCARDIOGRAM  10/28/2016    Dr. Vishal Gonzales r/o embolic source         Family History   Problem Relation Age of Onset    Heart Attack Father         Social History     Tobacco Use    Smoking status: Current Every Day Smoker     Packs/day: 1.00    Smokeless tobacco: Never Used   Substance Use Topics    Alcohol use: No     Comment: 2 cups of coffee a day         Prior to Admission medications    Medication Sig Start Date End Date Taking?  Authorizing Provider   canagliflozin (INVOKANA) 300 MG TABS tablet Take 300 mg by mouth every morning (before breakfast)   Yes Historical Provider, MD   Biotin 86883 MCG TABS Take by mouth   Yes Historical Provider, MD   zinc 50 MG TABS tablet Take 50 mg by mouth daily   Yes Historical Provider, MD   vitamin B-12 (CYANOCOBALAMIN) 1000 MCG tablet Take 1,000 mcg by mouth daily   Yes Historical Provider, MD   Turmeric (QC TUMERIC COMPLEX) 500 MG CAPS Take by mouth   Yes Historical Provider, MD   Ascorbic Acid (VITAMIN C) 1000 MG tablet Take 1,000 mg by mouth daily   Yes Historical Provider, MD   Fluticasone Propionate (FLONASE ALLERGY RELIEF NA) by Nasal route   Yes Historical Provider, MD   metoprolol succinate (TOPROL XL) 25 MG extended release tablet Take 1 tablet by mouth daily 9/25/20  Yes Danica Zaragoza MD   furosemide (LASIX) 20 MG tablet Take 1 tablet by mouth daily 9/25/20  Yes Danica Zaragoza MD   sacubitril-valsartan (ENTRESTO) 24-26 MG per tablet Take 0.5 tablets by mouth 2 times daily Take a half of a tablet BID 4/2/20  Yes Danica Zaragoza MD   Cholecalciferol (VITAMIN D) 125 MCG (5000 UT) CAPS Take 5,000 Units by mouth   Yes Historical Provider, MD   insulin glargine (BASAGLAR KWIKPEN) 100 UNIT/ML injection pen Inject 26 Units into the skin 2 times daily 26 am   30 units in pm   Yes Historical Provider, MD acetaminophen (TYLENOL) 325 MG tablet Take 650 mg by mouth 2 times daily as needed for Pain   Yes Historical Provider, MD   pantoprazole (PROTONIX) 40 MG tablet  6/7/17  Yes Historical Provider, MD   atorvastatin (LIPITOR) 40 MG tablet  6/7/17  Yes Historical Provider, MD   escitalopram (LEXAPRO) 20 MG tablet  6/7/17  Yes Historical Provider, MD   ferrous sulfate 325 (65 Fe) MG tablet  6/7/17  Yes Historical Provider, MD   traZODone (DESYREL) 50 MG tablet Take 50 mg by mouth nightly   Yes Historical Provider, MD   gabapentin (NEURONTIN) 300 MG capsule Take 300 mg by mouth 2 times daily. .   Yes Historical Provider, MD   clopidogrel (PLAVIX) 75 MG tablet Take 75 mg by mouth daily   Yes Historical Provider, MD   aspirin 81 MG EC tablet Take 1 tablet by mouth daily 10/28/16  Yes Salud Bhatia MD   metFORMIN (GLUCOPHAGE) 1000 MG tablet Take 1 tablet by mouth 2 times daily (with meals) 10/28/16  Yes Salud Bhatia MD   BREO ELLIPTA 200-25 MCG/INH AEPB  6/9/17   Historical Provider, MD   albuterol sulfate  (90 BASE) MCG/ACT inhaler Inhale 2 puffs into the lungs every 6 hours as needed for Wheezing    Historical Provider, MD        Ace inhibitors and Percocet [oxycodone-acetaminophen]     Review of Systems  A 10 point review of systems is negative unless otherwise specified in the HPI above. OBJECTIVE:      VITALS:  Vitals:    01/20/21 0524   BP: (!) 115/50   Pulse: 78   Resp: 16   Temp: 98.1 °F (36.7 °C)   SpO2: 95%        LABS:   Recent Labs     01/19/21  2103 01/20/21  0423   WBC 14.4* 8.8   HGB 13.8 12.8   HCT 43.9 40.1    378   CRP 1.3*  --    SEDRATE 44*  --        PHYSICAL EXAM:  Vascular:    DP / PT pulses nonpalpable bilaterally. CFT mildly sluggish to all digits. R second digit blanches. No pitting edema present bilaterally. Warm to warm temp gradient along right LE.     Neurologic:    Protective sensation is decreased in distal lower extremity bilaterally - verified using [place] digit  3. DM II with peripheral neuropathy  4. PVD   5. Tobacco abuse with associated vascular changes  6. Failed OP PO abx - keflex/doxy  7. Pedal xerosis  8. Hyperkeratotic lesions sub 1 & 5    Active Problems:    Cellulitis of foot       PLAN:  -Patient evaluated and chart reviewed - which includes all relevant imaging, labs, and ancillary notes. -Cardiology note reviewed. Input appreciated, risk stratification noted. -XRs reviewed. No obvious erosions of 2nd digit. No ST emphysema. -MRI and NIVS ordered per IM. Awaiting results. -Abx per IM/ID teams, V/Z now. -Dressing: Betadine DSD. Changes QOD. -Plans for surgery: Neuropathic ulceration of second digit with extensive tissue loss - will take to OR tomorrow for R 2nd digit amputation with Dr. Maverick Delaney. NPO AM / consent placed. Prior to, will need results of NIVS (possible vascular sx consult if severely impaired distal LE flow) and MRI.   -Will continue to follow while in hospital.    DW:DEMETRIUS Cartwright [facility] FACFAS  Fellowship-Trained Foot and Ankle Surgeon  Diplomate, [employer] Board [employer] Foot [employer]le [employer]  [phone]

## 2021-01-20 NOTE — CARE COORDINATION
CM note: attempted to meet with patient for transition of care planning, patient out of room. Will check back later today.

## 2021-01-20 NOTE — CONSULTS
[facility]  Consult Note    [address] [address]  [place], [address]  Phone [phone]   [phone]      Admit Date: 2021  8:10 PM  Pt Name: Corazon Archuleta  MRN: [id number]  : 1965  Reason for Consult:    Chief Complaint   Patient presents with    Wound Check     Wound check to the right foot. Patient told to come to the ER by Dr Liz Mathews. Requesting Physician:  Lawyer Danny DO  PCP: Leilani Shah MD  History Obtained From:  patient  ID consulted for Cellulitis of foot [N86.689]  on hospital day 1   Face to face encounter   Catina Jauregui       Chief Complaint   Patient presents with    Wound Check     Wound check to the right foot. Patient told to come to the ER by Dr Liz Mathews.    worsening of right second toe     HISTORYOF PRESENT ILLNESS      Corazon Archuleta is a 63 y.o. female who presents with significant past medical history of  has a past medical history of Anxiety, CAD (coronary artery disease), Cardiomyopathy ([address] Cerebral artery occlusion with cerebral infarction ([address] COPD (chronic obstructive pulmonary disease) ([address] Depression, Diabetes mellitus ([address] Hx of cardiovascular stress test, Hyperlipidemia, Nonrheumatic mitral (valve) insufficiency, Nonrheumatic tricuspid (valve) insufficiency, Sleep apnea, and Smoker. ED TRIAGEVITALS  BP: (!) 115/50, Temp: 98.1 °F (36.7 °C), Pulse: 78, Resp: 16, SpO2: 95 %  HPI  Patient was admitted to hospital for worsening of right 2nd toe wound and erythema to right foot. She recently had a debridement at her podiatrist office last week according to patient with Dr. No Villela- she was given doxycycline and keflex. She does not have feeling to feet/ toes. She denies any nausea, vomiting, fever, chills, or diarrhea. No rash. No shortness of breath.    She was started on IV atbs on admission- with vancomycin and zosyn  Cultures are pending   Her wbc were -14.4 on admission    Patient is known to ID service- was seen in 2016- for candida peritonitis with complicated abdominal infection. ID has been asked to evaluate and manage atbs.      REVIEW OF SYSTEMS    (2-9 systems for level 4, 10 or more for level 5)     REVIEW OF SYSTEMS:    CONSTITUTIONAL:   No fever, chills, weight loss  ALLERGIES:    No urticaria, hay fever,    EYES:     No blurry vision, loss of vision,eye pain  ENT:      No hearing loss, sore throat  CARDIOVASCULAR:   No chest pain or palpitations, + hyperlipidemia   RESPIRATORY:   No cough, sob, + sleep apnea   ENDOCRINE:    No increase thirst, urination , + DM  HEME-LYMPH:   No easy bruising or bleeding  GI:     No nausea, vomiting or diarrhea  :     No urinary complaints  NEURO:    No seizures, stroke, HA  MUSCULOSKELETAL:  No muscle aches or pain, no join tpain  SKIN:     No rash or itch  PSYCH:    ++ depression or anxiety      CURRENT MEDICATIONS     Current Facility-Administered Medications:     Arformoterol Tartrate (BROVANA) nebulizer solution 15 mcg, 15 mcg, Nebulization, BID, 15 mcg at 01/20/21 0615 **AND** budesonide (PULMICORT) nebulizer suspension 500 mcg, 0.5 mg, Nebulization, BID, Ismail U Shanda, DO, 500 mcg at 01/20/21 0615    ascorbic acid (VITAMIN C) tablet 1,000 mg, 1,000 mg, Oral, Daily, Ismail U Shanda, DO    escitalopram (LEXAPRO) tablet 20 mg, 20 mg, Oral, Daily, Ismail U Shanda, DO    vitamin B-12 (CYANOCOBALAMIN) tablet 1,000 mcg, 1,000 mcg, Oral, Daily, Ismail U Shanda, DO    zinc sulfate (ZINCATE) capsule 50 mg, 50 mg, Oral, Daily, Ismail U Shanda, DO    traZODone (DESYREL) tablet 50 mg, 50 mg, Oral, Nightly, Ismail U Shanda, DO    piperacillin-tazobactam (ZOSYN) 3,375 mg in dextrose 5 % 50 mL IVPB extended infusion (mini-bag), 3,375 mg, Intravenous, Q8H, Ismail U Shanda, DO, Last Rate: 12.5 mL/hr at 01/20/21 0528, 3,375 mg at 01/20/21 0528    0.9 % sodium chloride infusion, , Intravenous, Q8H, Ismail U Shanda, DO    insulin lispro (HUMALOG) injection vial 0-6 Units, 0-6 Units, Subcutaneous, TID WC, Ismail U Shanda, DO    insulin lispro (HUMALOG) injection vial 0-3 Units, 0-3 Units, Subcutaneous, Nightly, Ismail U Shanda, DO    LORazepam (ATIVAN) tablet 1 mg, 1 mg, Oral, Once, Rudean Spine, DO    vancomycin (VANCOCIN) 1,750 mg in dextrose 5 % 500 mL IVPB, 1,750 mg, Intravenous, Q24H, Ismail U Shanda, DO    [Held by provider] aspirin EC tablet 81 mg, 81 mg, Oral, Daily, Ismail U Shanda, DO    atorvastatin (LIPITOR) tablet 40 mg, 40 mg, Oral, Nightly, Ismail U Shanda, DO, 40 mg at 01/20/21 0046    [Held by provider] clopidogrel (PLAVIX) tablet 75 mg, 75 mg, Oral, Daily, Ismail U Shanda, DO    sacubitril-valsartan (ENTRESTO) 24-26 MG per tablet 0.5 tablet, 0.5 tablet, Oral, BID, Ismail U Shanda, DO, 0.5 tablet at 01/20/21 0111    pantoprazole (PROTONIX) tablet 40 mg, 40 mg, Oral, QAM AC, Ismail U Shanda, DO, 40 mg at 01/20/21 8078    metoprolol succinate (TOPROL XL) extended release tablet 25 mg, 25 mg, Oral, Daily, Ismail U Shanda, DO    insulin glargine (LANTUS) injection vial 26 Units, 26 Units, Subcutaneous, BID, Ismail U Shanda, DO    gabapentin (NEURONTIN) capsule 300 mg, 300 mg, Oral, BID, Ismail U Shanda, DO, 300 mg at 01/20/21 0046    furosemide (LASIX) tablet 20 mg, 20 mg, Oral, Daily, Ismail U Shanda, DO    ferrous sulfate (IRON 325) tablet 325 mg, 325 mg, Oral, Daily with breakfast, Ismail U Shanda, DO    glucose (GLUTOSE) 40 % oral gel 15 g, 15 g, Oral, PRN, Ismail U Shanda, DO    dextrose 50 % IV solution, 12.5 g, Intravenous, PRN, Gavi Daub, DO    glucagon (rDNA) injection 1 mg, 1 mg, Intramuscular, PRN, Gavi Daub, DO    dextrose 5 % solution, 100 mL/hr, Intravenous, PRN, [place] Daub, DO    sodium chloride flush 0.9 % injection 10 mL, 10 mL, Intravenous, 2 times per day, Gavi Daub, DO    sodium chloride flush 0.9 % injection 10 mL, 10 mL, Intravenous, PRN, Gavi Daub, DO    enoxaparin (Chasity Chowdhury) injection 40 mg, 40 mg, Subcutaneous, Daily, Pinky Coupe, DO    acetaminophen (TYLENOL) tablet 650 mg, 650 mg, Oral, Q6H PRN **OR** acetaminophen (TYLENOL) suppository 650 mg, 650 mg, Rectal, Q6H PRN, Pinky Coupe, DO  ALLERGIES     Ace inhibitors and Percocet [oxycodone-acetaminophen]    There is no immunization history on file for this patient.   PAST MEDICAL HISTORY     Past Medical History:   Diagnosis Date    Anxiety     CAD (coronary artery disease)     x5 stents    [address]     Cerebral artery occlusion with cerebral infarction ([facility]     COPD (chronic obstructive pulmonary disease) ([facility]     Depression     Diabetes mellitus ([facility]     IDDM    Hx of cardiovascular stress test 3/22/2016    Lexiscan    Hyperlipidemia     Nonrheumatic mitral (valve) insufficiency     Nonrheumatic tricuspid (valve) insufficiency     Sleep apnea     cpap    Smoker     current 1PPD X35yrs     SURGICAL HISTORY       Past Surgical History:   Procedure Laterality Date    ABDOMEN SURGERY      ABDOMINAL EXPLORATION SURGERY N/A 6-5-16    Excision perforated mid body gastric ulcer, primary closure omentum patch    BACK SURGERY      CARDIAC CATHETERIZATION  05/03/2018    Dr. Nanda Chaves CATH LAB PROCEDURE  05/03/2018    Dr. Jonathan Dixon multiple PCIs     KNEE ARTHROPLASTY      TRANSESOPHAGEAL ECHOCARDIOGRAM  10/28/2016    Dr. Jonathan Dixon r/o embolic source     FAMILY HISTORY       Family History   Problem Relation Age of Onset    Heart Attack Father      SOCIAL HISTORY       Social History     Socioeconomic History    Marital status:      Spouse name: None    Number of children: None    Years of education: None    Highest education level: None   Occupational History    None   Social Needs    Financial resource strain: None    Food insecurity     Worry: None     Inability: None    Transportation needs     Medical: None     Non-medical: None   Tobacco Use    Smoking status: Current Every Day Smoker     Packs/day: 1.00    Smokeless tobacco: Never Used   Substance and Sexual Activity    Alcohol use: No     Comment: 2 cups of coffee a day     Drug use: No    Sexual activity: Not Currently   Lifestyle    Physical activity     Days per week: None     Minutes per session: None    Stress: None   Relationships    Social connections     Talks on phone: None     Gets together: None     Attends Mandaen service: None     Active member of club or organization: None     Attends meetings of clubs or organizations: None     Relationship status: None    Intimate partner violence     Fear of current or ex partner: None     Emotionally abused: None     Physically abused: None     Forced sexual activity: None   Other Topics Concern    None   Social History Narrative    None     · Lives alone     PHYSICAL EXAM    (up to 7 for level 4, 8 or more forlevel 5)     ED Triage Vitals [01/19/21 1933]   BP Temp Temp Source Pulse Resp SpO2 Height Weight   (!) 146/69 98.1 °F (36.7 °C) Oral 94 16 96 % 5' 9\" (1.753 m) 245 lb (111.1 kg)     Vitals:    Vitals:    01/19/21 2300 01/19/21 2345 01/19/21 2358 01/20/21 0524   BP: (!) 146/62 (!) 155/97  (!) 115/50   Pulse: 83 85  78   Resp: 18 16  16   Temp: 98.4 °F (36.9 °C) 97.7 °F (36.5 °C)  98.1 °F (36.7 °C)   TempSrc: Oral Oral  Oral   SpO2: 94% 93%  95%   Weight:   245 lb (111.1 kg)    Height:   5' 9\" (1.753 m)      Physical Exam   Constitutional/General: Alert and oriented, NAD  Head: NC/AT  Eyes: PERRL, EOMI  Mouth: Normal mucosa, no thrush   Neck: Supple, full ROM,    Pulmonary: Lungs clear to auscultation bilaterally. Cardiovascular:  Regular rate and rhythm. Abdomen: Soft, + BS. No distension. Nontender. Extremities: Moves all extremities x 4.    Warm and well perfused  Pulses:  Could not palpate distal pedal pulse to right foot- + erythema - right second to with ulcer noted- + discoloration at the tip of the toe. No drainage. Skin: Warm and dry without rash  Neurologic:    No focal deficits  Psych: Normal Affect  PIV           DIAGNOSTICRESULTS   RADIOLOGY:   Xr Foot Right (min 3 Views)  Result Date: 1/19/2021  1. No acute osseous findings about the right foot. No erosive changes seen. There appears to be some soft tissue irregularity to the dorsal and palmar aspect of the forefoot on the lateral view. 2. Mild right large toe osteoarthritis. Calcaneal enthesophytes. 3.  Arteriosclerosis. LABS  Recent Labs     01/19/21 2103 01/20/21 0423   WBC 14.4* 8.8   HGB 13.8 12.8   HCT 43.9 40.1   MCV 89.2 86.6    378     Recent Labs     01/19/21 2103 01/20/21 0423   * 135   K 4.8 4.4   CL 96* 102   CO2 21* 23   BUN 32* 31*   CREATININE 1.5* 1.4*   GFRAA 43 47   LABGLOM 36 39   GLUCOSE 161* 173*   PROT 8.4* 7.0   LABALBU 3.9 3.5   CALCIUM 9.6 9.0   BILITOT 0.2 0.2   ALKPHOS 119* 98   AST 11 8   ALT 11 8     No results for input(s): PROCAL in the last 72 hours.   Lab Results   Component Value Date    CRP 1.3 (H) 01/19/2021    CRP 2.7 (H) 06/20/2016    CRP 4.4 (H) 06/16/2016     Lab Results   Component Value Date    SEDRATE 44 (H) 01/19/2021    SEDRATE 86 (H) 06/20/2016    SEDRATE 95 (H) 06/16/2016     COVID-19/SUJEY-COV2 LABS  Recent Labs     01/19/21 2103 01/20/21 0423   CRP 1.3*  --    AST 11 8   ALT 11 8   TRIG  --  237*     Lab Results   Component Value Date    CHOL 127 01/20/2021    TRIG 237 01/20/2021    HDL 27 01/20/2021    LDLCALC 53 01/20/2021    LABVLDL 47 01/20/2021     MICROBIOLOGY:  Cultures :   Lab Results   Component Value Date    BC 5 Days- no growth 06/12/2016    ORG Staphylococcus aureus 06/24/2016    ORG Sveta glabrata 06/05/2016    ORG Candida albicans 06/05/2016    ORG Candida species 06/05/2016     Lab Results   Component Value Date    BLOODCULT2 5 Days- no growth 06/12/2016    ORG Staphylococcus aureus 06/24/2016 ORG Candida glabrata 06/05/2016    ORG Candida albicans 06/05/2016    ORG Candida species 06/05/2016     WOUND/ABSCESS   Date Value Ref Range Status   06/24/2016   Final    Heavy growth  Methicillin resistant Staph aureus isolated. Most Methicillin  resistant Staphylococcus are usually resistant to multiple  antibiotics including other B-Lactams, Aminoglycosides,  Macrolides, Clindamycin and Tetracycline. Contact isolation  is indicated. 07/28/2015 Moderate growth  Final   07/28/2015   Final    Moderate growth  Methicillin resistant Staph aureus isolated. Most Methicillin  resistant Staphylococcus are usually resistant to multiple  antibiotics including other B-Lactams, Aminoglycosides,  Macrolides, Clindamycin and Tetracycline. Contact isolation  is indicated. This isolate is presumed to be resistant based on the  detection of inducible Clindamycin resistance. Clindamycin  may still be effective in some patients. Body Fluid Culture, Sterile   Date Value Ref Range Status   06/05/2016 Neisseria gonorrhoeae not isolated (A)  Final   06/05/2016 Rare growth  Final   06/05/2016 Light growth  Final     No results found for: CXSURG  Urine Culture, Routine   Date Value Ref Range Status   06/12/2016 <10,000 CFU/mL  Gram positive organism    Final     MRSA Culture Only   Date Value Ref Range Status   10/25/2016 Methicillin resistant Staph aureus not isolated  Final   06/05/2016 Methicillin resistant Staph aureus not isolated  Final     Patient is a 63 y.o. female who presented with   Chief Complaint   Patient presents with    Wound Check     Wound check to the right foot. Patient told to come to the ER by Dr Bert Martinez. FINAL IMPRESSION      1. Diabetic ulcer of toe of right foot associated with type 2 diabetes mellitus, unspecified ulcer stage ([address]    2.  Cellulitis of right foot    · CARYL  · Right second DFU  · Leukocytosis   · Failed outpt antibiotics   · History of MRSA colonization   · Seen in 2016 - for candida peritonitis- complicated abdominal infection      Plan :  · Start vancomycin-pharmacy dosing- will continue for now   · On zosyn- will change to Cefepime for now  · Check sed rate-44, crp- 1.3 , aso-313, wound cx-pending   · Watch creat   · MRSA screen   · Podiatry following - plan to amp the right second toe once vascular work up is complete  · Vascular workup     Imaging and labs were reviewed per medical records and any ID pertinent labs were addressed with the patient. The patient/FAMILY  was educated about the diagnosis, prognosis, indications, risks and benefits of treatment. An opportunity to ask questions was given to the patient/FAMILY and questions were answered. Thank you for involving me in the care of Rupal Delarosa. Please do not hesitate to call for any questions or concerns. Electronically signed by DAVID Gaviria on 1/20/2021 at 10:19 AM      As above    This is a face to face encounter with Rupal Delarosa on 1/20/2021. I have performed and participated in the history, exam, medical decision making, and  POC  with the NURSE PRACTITIONER and provided the instruction and education regarding this patient's care. Imaging and labs were reviewed per medical records and any ID pertinent labs were addressed with the patient. The patient was educated about the diagnosis, prognosis, indications, risks and benefits of treatment. Pt had the opportunity to ask questions. All questions were answered. Pt in bed comfortable has no c/o f/c/nv/d  No issues with atbx  Foot with swelling wounds noted  For or   Check cx   Cont atbx  leukocytosis dfu/infection     Thank you for involving me in the care of Rupal Delarosa. Please do not hesitate to call for any questions or concerns.     Electronically signed by Bart Alvarado MD on 1/21/2021 at 9:28 AM    Phone [phone]  Fax [phone]

## 2021-01-21 LAB
ALBUMIN SERPL-MCNC: 3.5 G/DL (ref 3.5–5.2)
ALP BLD-CCNC: 98 U/L (ref 35–104)
ALT SERPL-CCNC: 9 U/L (ref 0–32)
ANION GAP SERPL CALCULATED.3IONS-SCNC: 10 MMOL/L (ref 7–16)
AST SERPL-CCNC: 10 U/L (ref 0–31)
BASOPHILS ABSOLUTE: 0.08 E9/L (ref 0–0.2)
BASOPHILS RELATIVE PERCENT: 1.2 % (ref 0–2)
BILIRUB SERPL-MCNC: 0.2 MG/DL (ref 0–1.2)
BUN BLDV-MCNC: 27 MG/DL (ref 6–20)
CALCIUM SERPL-MCNC: 9.4 MG/DL (ref 8.6–10.2)
CHLORIDE BLD-SCNC: 103 MMOL/L (ref 98–107)
CO2: 25 MMOL/L (ref 22–29)
CREAT SERPL-MCNC: 1.3 MG/DL (ref 0.5–1)
EOSINOPHILS ABSOLUTE: 0.3 E9/L (ref 0.05–0.5)
EOSINOPHILS RELATIVE PERCENT: 4.5 % (ref 0–6)
GFR AFRICAN AMERICAN: 51
GFR NON-AFRICAN AMERICAN: 42 ML/MIN/1.73
GLUCOSE BLD-MCNC: 162 MG/DL (ref 74–99)
HCT VFR BLD CALC: 40.4 % (ref 34–48)
HEMOGLOBIN: 12.9 G/DL (ref 11.5–15.5)
IMMATURE GRANULOCYTES #: 0.02 E9/L
IMMATURE GRANULOCYTES %: 0.3 % (ref 0–5)
LYMPHOCYTES ABSOLUTE: 2.01 E9/L (ref 1.5–4)
LYMPHOCYTES RELATIVE PERCENT: 30.4 % (ref 20–42)
MCH RBC QN AUTO: 27.5 PG (ref 26–35)
MCHC RBC AUTO-ENTMCNC: 31.9 % (ref 32–34.5)
MCV RBC AUTO: 86.1 FL (ref 80–99.9)
METER GLUCOSE: 188 MG/DL (ref 74–99)
METER GLUCOSE: 194 MG/DL (ref 74–99)
METER GLUCOSE: 202 MG/DL (ref 74–99)
METER GLUCOSE: 213 MG/DL (ref 74–99)
MONOCYTES ABSOLUTE: 0.57 E9/L (ref 0.1–0.95)
MONOCYTES RELATIVE PERCENT: 8.6 % (ref 2–12)
NEUTROPHILS ABSOLUTE: 3.63 E9/L (ref 1.8–7.3)
NEUTROPHILS RELATIVE PERCENT: 55 % (ref 43–80)
PDW BLD-RTO: 13.3 FL (ref 11.5–15)
PLATELET # BLD: 389 E9/L (ref 130–450)
PMV BLD AUTO: 9.5 FL (ref 7–12)
POTASSIUM SERPL-SCNC: 4.4 MMOL/L (ref 3.5–5)
RBC # BLD: 4.69 E12/L (ref 3.5–5.5)
SODIUM BLD-SCNC: 138 MMOL/L (ref 132–146)
TOTAL PROTEIN: 7.4 G/DL (ref 6.4–8.3)
WBC # BLD: 6.6 E9/L (ref 4.5–11.5)

## 2021-01-21 PROCEDURE — 2580000003 HC RX 258: Performed by: CLINICAL NURSE SPECIALIST

## 2021-01-21 PROCEDURE — 6370000000 HC RX 637 (ALT 250 FOR IP): Performed by: NURSE PRACTITIONER

## 2021-01-21 PROCEDURE — 6370000000 HC RX 637 (ALT 250 FOR IP): Performed by: INTERNAL MEDICINE

## 2021-01-21 PROCEDURE — 2580000003 HC RX 258: Performed by: INTERNAL MEDICINE

## 2021-01-21 PROCEDURE — 6360000002 HC RX W HCPCS: Performed by: CLINICAL NURSE SPECIALIST

## 2021-01-21 PROCEDURE — 80053 COMPREHEN METABOLIC PANEL: CPT

## 2021-01-21 PROCEDURE — 94640 AIRWAY INHALATION TREATMENT: CPT

## 2021-01-21 PROCEDURE — 1200000000 HC SEMI PRIVATE

## 2021-01-21 PROCEDURE — 6360000002 HC RX W HCPCS: Performed by: INTERNAL MEDICINE

## 2021-01-21 PROCEDURE — 82962 GLUCOSE BLOOD TEST: CPT

## 2021-01-21 PROCEDURE — 36415 COLL VENOUS BLD VENIPUNCTURE: CPT

## 2021-01-21 PROCEDURE — 85025 COMPLETE CBC W/AUTO DIFF WBC: CPT

## 2021-01-21 RX ORDER — INSULIN GLARGINE 100 [IU]/ML
30 INJECTION, SOLUTION SUBCUTANEOUS 2 TIMES DAILY
Status: DISCONTINUED | OUTPATIENT
Start: 2021-01-21 | End: 2021-01-22

## 2021-01-21 RX ADMIN — INSULIN LISPRO 2 UNITS: 100 INJECTION, SOLUTION INTRAVENOUS; SUBCUTANEOUS at 16:42

## 2021-01-21 RX ADMIN — ARFORMOTEROL TARTRATE 15 MCG: 15 SOLUTION RESPIRATORY (INHALATION) at 06:23

## 2021-01-21 RX ADMIN — METOPROLOL SUCCINATE 25 MG: 25 TABLET, EXTENDED RELEASE ORAL at 08:11

## 2021-01-21 RX ADMIN — FUROSEMIDE 20 MG: 20 TABLET ORAL at 08:12

## 2021-01-21 RX ADMIN — GABAPENTIN 300 MG: 300 CAPSULE ORAL at 21:33

## 2021-01-21 RX ADMIN — SACUBITRIL AND VALSARTAN 0.5 TABLET: 24; 26 TABLET, FILM COATED ORAL at 21:33

## 2021-01-21 RX ADMIN — SODIUM CHLORIDE: 9 INJECTION, SOLUTION INTRAVENOUS at 21:39

## 2021-01-21 RX ADMIN — CEFEPIME 2000 MG: 2 INJECTION, POWDER, FOR SOLUTION INTRAMUSCULAR; INTRAVENOUS at 05:40

## 2021-01-21 RX ADMIN — CEFEPIME 2000 MG: 2 INJECTION, POWDER, FOR SOLUTION INTRAMUSCULAR; INTRAVENOUS at 17:29

## 2021-01-21 RX ADMIN — ESCITALOPRAM OXALATE 20 MG: 10 TABLET ORAL at 08:11

## 2021-01-21 RX ADMIN — TRAZODONE HYDROCHLORIDE 50 MG: 50 TABLET ORAL at 21:33

## 2021-01-21 RX ADMIN — FERROUS SULFATE TAB 325 MG (65 MG ELEMENTAL FE) 325 MG: 325 (65 FE) TAB at 08:12

## 2021-01-21 RX ADMIN — SODIUM CHLORIDE: 9 INJECTION, SOLUTION INTRAVENOUS at 11:45

## 2021-01-21 RX ADMIN — OXYCODONE HYDROCHLORIDE AND ACETAMINOPHEN 1000 MG: 500 TABLET ORAL at 08:11

## 2021-01-21 RX ADMIN — ACETAMINOPHEN 650 MG: 325 TABLET, FILM COATED ORAL at 23:14

## 2021-01-21 RX ADMIN — VANCOMYCIN HYDROCHLORIDE 1750 MG: 5 INJECTION, POWDER, LYOPHILIZED, FOR SOLUTION INTRAVENOUS at 23:00

## 2021-01-21 RX ADMIN — INSULIN LISPRO 1 UNITS: 100 INJECTION, SOLUTION INTRAVENOUS; SUBCUTANEOUS at 08:14

## 2021-01-21 RX ADMIN — ENOXAPARIN SODIUM 40 MG: 40 INJECTION SUBCUTANEOUS at 08:12

## 2021-01-21 RX ADMIN — CYANOCOBALAMIN TAB 1000 MCG 1000 MCG: 1000 TAB at 08:12

## 2021-01-21 RX ADMIN — SACUBITRIL AND VALSARTAN 0.5 TABLET: 24; 26 TABLET, FILM COATED ORAL at 09:43

## 2021-01-21 RX ADMIN — Medication 50 MG: at 08:11

## 2021-01-21 RX ADMIN — GABAPENTIN 300 MG: 300 CAPSULE ORAL at 08:11

## 2021-01-21 RX ADMIN — BUDESONIDE 500 MCG: 0.5 INHALANT RESPIRATORY (INHALATION) at 17:50

## 2021-01-21 RX ADMIN — BUDESONIDE 500 MCG: 0.5 INHALANT RESPIRATORY (INHALATION) at 06:23

## 2021-01-21 RX ADMIN — INSULIN LISPRO 1 UNITS: 100 INJECTION, SOLUTION INTRAVENOUS; SUBCUTANEOUS at 11:46

## 2021-01-21 RX ADMIN — ARFORMOTEROL TARTRATE 15 MCG: 15 SOLUTION RESPIRATORY (INHALATION) at 17:51

## 2021-01-21 RX ADMIN — DESMOPRESSIN ACETATE 40 MG: 0.2 TABLET ORAL at 21:37

## 2021-01-21 RX ADMIN — Medication 10 ML: at 21:41

## 2021-01-21 RX ADMIN — PANTOPRAZOLE SODIUM 40 MG: 40 TABLET, DELAYED RELEASE ORAL at 06:01

## 2021-01-21 RX ADMIN — Medication 10 ML: at 08:13

## 2021-01-21 RX ADMIN — INSULIN GLARGINE 30 UNITS: 100 INJECTION, SOLUTION SUBCUTANEOUS at 08:13

## 2021-01-21 ASSESSMENT — PAIN - FUNCTIONAL ASSESSMENT: PAIN_FUNCTIONAL_ASSESSMENT: PREVENTS OR INTERFERES SOME ACTIVE ACTIVITIES AND ADLS

## 2021-01-21 ASSESSMENT — PAIN SCALES - GENERAL: PAINLEVEL_OUTOF10: 3

## 2021-01-21 NOTE — PROGRESS NOTES
[facility]  [facility]  Progress Note    NAME: Surendra Bowers  MR:  [id number]  :   1965  DATE OF SERVICE:21    This is a face to face encounter with Surendra Bowers 63 y.o. female on 21  ID following for   Chief Complaint   Patient presents with    Wound Check     Wound check to the right foot. Patient told to come to the ER by Dr Julian Grant. SUBJECTIVE:  IN BED HAS NO C/O  Patient is tolerating medications. No reported adverse drug reactions. Review of systems:  As stated above in the chief complaint, otherwise negative.     Medications:  Scheduled Meds:   insulin glargine  30 Units Subcutaneous BID    Arformoterol Tartrate  15 mcg Nebulization BID    And    budesonide  0.5 mg Nebulization BID    vitamin C  1,000 mg Oral Daily    escitalopram  20 mg Oral Daily    vitamin B-12  1,000 mcg Oral Daily    zinc sulfate  50 mg Oral Daily    traZODone  50 mg Oral Nightly    insulin lispro  0-6 Units Subcutaneous TID WC    insulin lispro  0-3 Units Subcutaneous Nightly    vancomycin  1,750 mg Intravenous Q24H    cefepime  2,000 mg Intravenous Q12H    [Held by provider] aspirin  81 mg Oral Daily    atorvastatin  40 mg Oral Nightly    [Held by provider] clopidogrel  75 mg Oral Daily    sacubitril-valsartan  0.5 tablet Oral BID    pantoprazole  40 mg Oral QAM AC    metoprolol succinate  25 mg Oral Daily    gabapentin  300 mg Oral BID    furosemide  20 mg Oral Daily    ferrous sulfate  325 mg Oral Daily with breakfast    sodium chloride flush  10 mL Intravenous 2 times per day    enoxaparin  40 mg Subcutaneous Daily     Continuous Infusions:   sodium chloride      dextrose       PRN Meds:glucose, dextrose, glucagon (rDNA), dextrose, sodium chloride flush, acetaminophen **OR** acetaminophen    OBJECTIVE:  /65   Pulse 74   Temp 97.8 °F (36.6 °C) (Oral)   Resp 14   Ht 5' 9\" (1.753 m)   Wt 245 lb (111.1 kg)   SpO2 94%   BMI 36.18 kg/m²   Temp Av.9 °F (36.6 °C)  Min: 97.8 °F (36.6 °C)  Max: 98 °F (36.7 °C)  Constitutional:  The patient is awake, alert, and oriented. Skin:    Warm and dry. HEENT:    AT/NC   Chest:   No use of accessory muscles to breathe. Symmetrical expansion. Cardiovascular:  S1 and S2 are rhythmic and regular. No murmurs appreciated. Abdomen:   Positive bowel sounds to auscultation. Benign to palpation. Extremities:     edema. DRY REDNESS THE SAME LESS SWOLLEN  CNS    AAxO   Lines: pIV        Radiology:  Laboratory and Tests Review:  Lab Results   Component Value Date    WBC 6.6 2021    WBC 8.8 2021    WBC 14.4 (H) 2021    HGB 12.9 2021    HCT 40.4 2021    MCV 86.1 2021     2021     No results found for: CRPHS  Lab Results   Component Value Date    ALT 9 2021    AST 10 2021    ALKPHOS 98 2021    BILITOT 0.2 2021     Lab Results   Component Value Date     2021    K 4.4 2021     2021    CO2 25 2021    BUN 27 2021    CREATININE 1.3 2021    CREATININE 1.4 2021    CREATININE 1.5 2021    GFRAA 51 2021    LABGLOM 42 2021    GLUCOSE 162 2021    GLUCOSE 179 2011    PROT 7.4 2021    LABALBU 3.5 2021    LABALBU 4.2 2011    CALCIUM 9.4 2021    BILITOT 0.2 2021    ALKPHOS 98 2021    AST 10 2021    ALT 9 2021     Lab Results   Component Value Date    CRP 1.3 (H) 2021    CRP 2.7 (H) 2016    CRP 4.4 (H) 2016     Lab Results   Component Value Date    SEDRATE 44 (H) 2021    SEDRATE 86 (H) 2016    SEDRATE 95 (H) 2016       Microbiology:   No results for input(s): COVID19 in the last 72 hours.   Lab Results   Component Value Date    BLOODCULT2 5 Days- no growth 2016    ORG Staphylococcus aureus 2016    ORG Sveta glabrata 2016    ORG Candida albicans 2016    ORG Candida species 06/05/2016     WOUND/ABSCESS   Date Value Ref Range Status   06/24/2016   Final    Heavy growth  Methicillin resistant Staph aureus isolated. Most Methicillin  resistant Staphylococcus are usually resistant to multiple  antibiotics including other B-Lactams, Aminoglycosides,  Macrolides, Clindamycin and Tetracycline. Contact isolation  is indicated. 07/28/2015 Moderate growth  Final   07/28/2015   Final    Moderate growth  Methicillin resistant Staph aureus isolated. Most Methicillin  resistant Staphylococcus are usually resistant to multiple  antibiotics including other B-Lactams, Aminoglycosides,  Macrolides, Clindamycin and Tetracycline. Contact isolation  is indicated. This isolate is presumed to be resistant based on the  detection of inducible Clindamycin resistance. Clindamycin  may still be effective in some patients. No results found for: RESPSMEAR  No results found for: MPNEUMO, CLAMYDCU, LABLEGI, AFBCX, FUNGSM, LABFUNG    MRSA Culture Only   Date Value Ref Range Status   10/25/2016 Methicillin resistant Staph aureus not isolated  Final      ASSESSMENT/PLAN:  Leukocytosis  Cellulitis right foot  Dfu/infection right second toe          vancomycin (VANCOCIN) 1,750 mg in dextrose 5 % 500 mL IVPB, Q24H FOLLOWED BY PHARMACY       cefepime (MAXIPIME) 2000 mg IVPB extended (mini-bag), Q12H     SEEN BY VASCULAR Angiogram and revascularization in am      · Monitor labs    Imaging and labs were reviewed per medical records. The patient was educated about the diagnosis, prognosis, indications, risks and benefits of treatment. An opportunity to ask questions was given to the patient/FAMILY. Thank you for involving me in the care of Mathieu Brown will continue to follow. Please do not hesitate to call for any questions or concerns.     Electronically signed by Kelsi Wilson MD on 1/21/2021 at 9:30 AM

## 2021-01-21 NOTE — PROGRESS NOTES
bruising. Hematologic/Lymphatic:  Denies bruising or bleeding. Physical Exam:  No intake/output data recorded. Intake/Output Summary (Last 24 hours) at 1/21/2021 0721  Last data filed at 1/21/2021 0630  Gross per 24 hour   Intake 740 ml   Output 2300 ml   Net -1560 ml   I/O last 3 completed shifts: In: 740 [P.O.:440; I.V.:300]  Out: 2300 [Urine:2300]  Patient Vitals for the past 96 hrs (Last 3 readings):   Weight   01/19/21 2358 245 lb (111.1 kg)   01/19/21 1933 245 lb (111.1 kg)     Vital Signs:   Blood pressure 110/65, pulse 74, temperature 97.8 °F (36.6 °C), temperature source Oral, resp. rate 14, height 5' 9\" (1.753 m), weight 245 lb (111.1 kg), SpO2 94 %. General appearance:  Alert, responsive, oriented to person, place, and time. Comfortable appearing, no distress. Head:  Normocephalic. No masses, lesions or tenderness. Eyes:  PERRLA. EOMI. Sclera clear. Buccal mucosa moist.  ENT:  Ears normal. Mucosa normal.  Neck:    Supple. Trachea midline. No thyromegaly. No JVD. No bruits. Heart:    Rhythm regular. Rate controlled. Systolic murmur. Lungs:    Midline prior sternotomy incision scar noted. Symmetrical.  Diminished bibasilar. Lungs clear to auscultation bilaterally. No wheezes. No rhonchi. No rales. Abdomen:   Soft. Obese. Non-tender. Non-distended. Bowel sounds positive. No organomegaly or masses. No pain on palpation. Extremities:    Peripheral pulses present, 2+ BUE 1+ BLE. No peripheral edema. No cyanosis. No clubbing. Neurologic:    Alert x 3. No focal deficit. Cranial nerves grossly intact. No focal weakness. Psych:   Behavior is normal. Mood appears normal. Speech is not rapid and/or pressured. Musculoskeletal:   Spine ROM normal. Muscular strength intact. Gait not assessed. Integumentary:  The right foot and the right lower extremity below the knee are erythematous, right lower extremity is circumferential erythema with warmth and tenderness.   The right foot second digit has a blackened ulceration on the distal phalanx tip and on the dorsal aspect of the second digit itself with circumferential erythema and fusiform enlargement raising further concern for osteomyelitis. No rashes  Skin normal color and texture.   Genitalia/Breast:  Deferred    Medication:  Scheduled Meds:   insulin glargine  30 Units Subcutaneous BID    Arformoterol Tartrate  15 mcg Nebulization BID    And    budesonide  0.5 mg Nebulization BID    vitamin C  1,000 mg Oral Daily    escitalopram  20 mg Oral Daily    vitamin B-12  1,000 mcg Oral Daily    zinc sulfate  50 mg Oral Daily    traZODone  50 mg Oral Nightly    insulin lispro  0-6 Units Subcutaneous TID WC    insulin lispro  0-3 Units Subcutaneous Nightly    vancomycin  1,750 mg Intravenous Q24H    cefepime  2,000 mg Intravenous Q12H    [Held by provider] aspirin  81 mg Oral Daily    atorvastatin  40 mg Oral Nightly    [Held by provider] clopidogrel  75 mg Oral Daily    sacubitril-valsartan  0.5 tablet Oral BID    pantoprazole  40 mg Oral QAM AC    metoprolol succinate  25 mg Oral Daily    gabapentin  300 mg Oral BID    furosemide  20 mg Oral Daily    ferrous sulfate  325 mg Oral Daily with breakfast    sodium chloride flush  10 mL Intravenous 2 times per day    enoxaparin  40 mg Subcutaneous Daily     Continuous Infusions:   sodium chloride      dextrose         Objective Data:  CBC with Differential:    Lab Results   Component Value Date    WBC 6.6 01/21/2021    RBC 4.69 01/21/2021    HGB 12.9 01/21/2021    HCT 40.4 01/21/2021     01/21/2021    MCV 86.1 01/21/2021    MCH 27.5 01/21/2021    MCHC 31.9 01/21/2021    RDW 13.3 01/21/2021    SEGSPCT 61 03/15/2014    BANDSPCT 5 06/06/2016    METASPCT 1 06/11/2016    LYMPHOPCT 30.4 01/21/2021    MONOPCT 8.6 01/21/2021    BASOPCT 1.2 01/21/2021    MONOSABS 0.57 01/21/2021    LYMPHSABS 2.01 01/21/2021    EOSABS 0.30 01/21/2021    BASOSABS 0.08 01/21/2021     BMP:    Lab Results Component Value Date     01/21/2021    K 4.4 01/21/2021     01/21/2021    CO2 25 01/21/2021    BUN 27 01/21/2021    LABALBU 3.5 01/21/2021    LABALBU 4.2 06/17/2011    CREATININE 1.3 01/21/2021    CALCIUM 9.4 01/21/2021    GFRAA 51 01/21/2021    LABGLOM 42 01/21/2021    GLUCOSE 162 01/21/2021    GLUCOSE 179 06/17/2011     FLP:    Lab Results   Component Value Date    TRIG 237 01/20/2021    HDL 27 01/20/2021    LDLCALC 53 01/20/2021    LABVLDL 47 01/20/2021       Wound Documentation:   Wound 04/27/15 Other (Comment) Foot Right #1 rt bottom foot aqc 2/27/15 Pressure stage 2: now diabetic non healing wound [place] 2 july 21 , 2015 (Active)   Number of days: 2094       Incision 06/05/16 Abdomen (Active)   Number of days: 6089       Wound 01/19/21 Toe (Comment  which one) Anterior;Right 2nd toe posterior (Active)   Wound Length (cm) 1.5 cm 01/19/21 2345   Wound Width (cm) 1.5 cm 01/19/21 2345   Wound Surface Area (cm^2) 2.25 cm^2 01/19/21 2345   Wound Assessment Pink/red; Other (Comment) 01/19/21 2345   Drainage Amount Scant 01/19/21 2345   Drainage Description Thin;Serous 01/19/21 2345   Odor None 01/19/21 2345   Yvonne-wound Assessment Other (Comment) 01/19/21 2345   Number of days: 0       Wound 01/19/21 Toe (Comment  which one) Right 2nd toe anterior (Active)   Wound Length (cm) 1 cm 01/19/21 2345   Wound Width (cm) 1 cm 01/19/21 2345   Wound Surface Area (cm^2) 1 cm^2 01/19/21 2345   Wound Assessment Pink/red; Other (Comment) 01/19/21 2345   Drainage Amount Scant 01/19/21 2345   Drainage Description Serous 01/19/21 2345   Odor None 01/19/21 2345   Yvonne-wound Assessment Other (Comment) 01/19/21 2345   Number of days: 0       Assessment:  1. Acute osteomyelitis involving the distal phalanx of the second digit with associated cellulitis in the setting of diabetes  2. Severe peripheral vascular disease involving the right lower extremity  3. Chronic kidney disease stage III  4.  Insulin dependent diabetes mellitus type II, HbA1c 8.4%  5. Coronary artery disease status post stent and CABG  6. Systolic congestive heart failure EF of 30 to 35% with stage I diastolic dysfunction  7. Mitral and tricuspid regurgitation  8. Severe apnea on CPAP  9. Nonoxygen dependent COPD with chronic respiratory failure  10. History of CVA with right-sided deficits and neuropathy  11. Hyperlipidemia  12. Depression  13. Current tobacco abuse    Plan:   Katherine Macdonald had MRI confirming osteomyelitis. Podiatry has evaluated and are recommending surgical treatment. Unfortunately, arterial studies performed did show arterial insufficiency and vascular will provide consultation. Presently, antiplatelet therapy is on hold and we will await vascular recommendations as she may need revascularization prior to podiatric intervention. Infectious diseases following and anti-infective therapy has been adjusted. Infectious work-up is in process. Slight adjustment made to the patient's insulin regimen. Continue current therapy. See orders for further plan of care. More than 50% of my time was spent at the bedside counseling/coordinating care with the patient and/or family with face to face contact. This time was spent reviewing notes and laboratory data as well as instructing and counseling the patient. Time I spent with the family or surrogate(s) is included only if the patient was incapable of providing the necessary information or participating in medical decisions. I also discussed the differential diagnosis and all of the proposed management plans with the patient and individuals accompanying the patient. I am readily available for any further decision-making and intervention. Nola Ortega, DAVID - CNP  1/21/2021  7:21 AM     Plans are for vascular intervention tomorrow to be followed by podiatric intervention at some point thereafter. She is maintained on IV antibiotic therapy in the setting of osteomyelitis.   She has been granted cardiac clearance for surgical intervention. We will continue to monitor blood sugars accordingly. Her pain is otherwise well controlled. I saw, examined, and discussed the patient with Joseline DAWN and agree with his assessment and plan.     Electronically signed by Fletcher Iverson DO on 1/21/2021 at 8:02 AM

## 2021-01-21 NOTE — PROGRESS NOTES
Podiatry Resident Progress Note    SUBJECTIVE:   Patient is seen bedside for follow up of right second digit infection with open wounds. No overnight events noted. Patient states she is doing well overall. Understands current situation and is willing to undergo vascular procedure before other infection control procedures. No pain in lower extremities today. Patient denies fever, chills, nausea, vomiting, shortness of breath, and chest pain. Past Medical History:   Diagnosis Date    Anxiety     CAD (coronary artery disease)     x5 stents    [facility]     Cerebral artery occlusion with cerebral infarction ([facility]     COPD [facility]chronic obstructive pulmonary disease) ([facility]     Depression     Diabetes mellitus ([facility]     IDDM    Hx of cardiovascular stress test 3/22/2016    Lexiscan    Hyperlipidemia     Nonrheumatic mitral (valve) insufficiency     Nonrheumatic tricuspid (valve) insufficiency     Sleep apnea     cpap    Smoker     current 1PPD X35yrs        Past Surgical History:   Procedure Laterality Date    ABDOMEN SURGERY      ABDOMINAL EXPLORATION SURGERY N/A 6-5-16    Excision perforated mid body gastric ulcer, primary closure omentum patch    BACK SURGERY      CARDIAC CATHETERIZATION  05/03/2018    Dr. Yoon Giraldo CATH LAB PROCEDURE  05/03/2018    Dr. Regine Benton multiple PCIs     KNEE ARTHROPLASTY      TRANSESOPHAGEAL ECHOCARDIOGRAM  10/28/2016    Dr. Regine Benton r/o embolic source         Family History   Problem Relation Age of Onset    Heart Attack Father         Social History     Tobacco Use    Smoking status: Current Every Day Smoker     Packs/day: 1.00    Smokeless tobacco: Never Used   Substance Use Topics    Alcohol use: No     Comment: 2 cups of coffee a day         Prior to Admission medications    Medication Sig Start Date End Date Taking?  Authorizing Provider   canagliflozin (INVOKANA) 300 MG TABS tablet Take 300 mg by mouth every morning (before breakfast)   Yes Historical Provider, MD   Biotin 26777 MCG TABS Take by mouth   Yes Historical Provider, MD   zinc 50 MG TABS tablet Take 50 mg by mouth daily   Yes Historical Provider, MD   vitamin B-12 (CYANOCOBALAMIN) 1000 MCG tablet Take 1,000 mcg by mouth daily   Yes Historical Provider, MD   Turmeric (QC TUMERIC COMPLEX) 500 MG CAPS Take by mouth   Yes Historical Provider, MD   Ascorbic Acid (VITAMIN C) 1000 MG tablet Take 1,000 mg by mouth daily   Yes Historical Provider, MD   Fluticasone Propionate (FLONASE ALLERGY RELIEF NA) by Nasal route   Yes Historical Provider, MD   metoprolol succinate (TOPROL XL) 25 MG extended release tablet Take 1 tablet by mouth daily 9/25/20  Yes Stanton Goodrich MD   furosemide (LASIX) 20 MG tablet Take 1 tablet by mouth daily 9/25/20  Yes Stantno Goodrich MD   sacubitril-valsartan (ENTRESTO) 24-26 MG per tablet Take 0.5 tablets by mouth 2 times daily Take a half of a tablet BID 4/2/20  Yes Stanton Goodrich MD   Cholecalciferol (VITAMIN D) 125 MCG (5000 UT) CAPS Take 5,000 Units by mouth   Yes Historical Provider, MD   insulin glargine (BASAGLAR KWIKPEN) 100 UNIT/ML injection pen Inject 26 Units into the skin 2 times daily 26 am   30 units in pm   Yes Historical Provider, MD   acetaminophen (TYLENOL) 325 MG tablet Take 650 mg by mouth 2 times daily as needed for Pain   Yes Historical Provider, MD   pantoprazole (PROTONIX) 40 MG tablet  6/7/17  Yes Historical Provider, MD   atorvastatin (LIPITOR) 40 MG tablet  6/7/17  Yes Historical Provider, MD   escitalopram (LEXAPRO) 20 MG tablet  6/7/17  Yes Historical Provider, MD   ferrous sulfate 325 (65 Fe) MG tablet  6/7/17  Yes Historical Provider, MD   traZODone (DESYREL) 50 MG tablet Take 50 mg by mouth nightly   Yes Historical Provider, MD   gabapentin (NEURONTIN) 300 MG capsule Take 300 mg by mouth 2 times daily.  Néstor Burgess Historical Provider, MD clopidogrel (PLAVIX) 75 MG tablet Take 75 mg by mouth daily   Yes Historical Provider, MD   aspirin 81 MG EC tablet Take 1 tablet by mouth daily 10/28/16  Yes Mirtha Walker MD   metFORMIN (GLUCOPHAGE) 1000 MG tablet Take 1 tablet by mouth 2 times daily (with meals) 10/28/16  Yes Mirtha Walker MD   BREO ELLIPTA 200-25 MCG/INH AEPB  6/9/17   Historical Provider, MD   albuterol sulfate  (90 BASE) MCG/ACT inhaler Inhale 2 puffs into the lungs every 6 hours as needed for Wheezing    Historical Provider, MD      Ace inhibitors and Percocet [oxycodone-acetaminophen]       OBJECTIVE:      VITALS:  Vitals:    01/21/21 0528   BP: 110/65   Pulse: 74   Resp: 14   Temp: 97.8 °F (36.6 °C)   SpO2: 94%        LABS:   Recent Labs     01/19/21  2103 01/20/21  0423 01/21/21  0458   WBC 14.4* 8.8 6.6   HGB 13.8 12.8 12.9   HCT 43.9 40.1 40.4    378 389   CRP 1.3*  --   --    SEDRATE 44*  --   --    ASO  --  313*  --        PHYSICAL EXAM:  Vascular:    DP / PT pulses nonpalpable bilaterally. CFT moderately sluggish to all digits. No pitting edema present bilaterally. Warm to warm temp gradient along right LE. Pedal hair absent. Neurologic:    Protective sensation is decreased in distal lower extremity bilaterally - verified using Whitman touch test.    Dermatologic:    Open lesion are present on R second digit x2 - distal tip and lateral PIPJ. Both wounds show firm hyperkeratosis surrounding with eschar formation. Distal measures approximately 0.75cm in diameter, nummular and lateral measures approximately 0.5cm in diameter, nummular. No drainage. No malodor noted. No lucho pus appreciated on compression. Distal wound does probe to bone - distal phalanx. Hyperkeratotic lesions sub 1 and 5 bilat. Erythema present to the level of the MPJ, additionally pretibial area - appears lessened today. Trophic changes of skin. Webspaces are dry and clean.  Clinical images shown below:                 Musculoskeletal: No pain on R 2nd digit, wounds, or surrounding area. Digital contracture of 2-5 bilaterally. Muscle strength 5/5 to all lower extremity groups tested bilaterally. Active/passive ROM is WNL and not painful in all lower extremity joints tested. Able to wiggle digits. No pain on compression of calves. IMAGING:  Xr Foot Right (min 3 Views)    Result Date: 1/19/2021  EXAMINATION: THREE XRAY VIEWS OF THE RIGHT FOOT 1/19/2021 9:57 pm COMPARISON: Right foot series from December 17, 2008 HISTORY: ORDERING SYSTEM PROVIDED HISTORY: pain TECHNOLOGIST PROVIDED HISTORY: Reason for exam:->pain FINDINGS: Radiographs of the right foot demonstrate no fractures with preserved alignment. Mild degenerative spurring of the right large toe metatarsal phalangeal joint. Prominent anterior and posterior calcaneal enthesophytes. No erosive changes. Osseous mineralization is normal.  No soft tissue abnormality. Arteriosclerosis present. There appears to be some soft tissue irregularity of the dorsal and palmar aspect of the forefoot on the lateral view. 1.  No acute osseous findings about the right foot. No erosive changes seen. There appears to be some soft tissue irregularity to the dorsal and palmar aspect of the forefoot on the lateral view. 2. Mild right large toe osteoarthritis. Calcaneal enthesophytes. 3.  Arteriosclerosis. ASSESEMENT:  1. Right second digit wounds (x2) POA, clinically infected  2. Acute osteomyelitis of digit  3. DM II with peripheral neuropathy  4. PVD   5. Tobacco abuse with associated vascular changes  6. Failed OP PO abx - keflex/doxy  7. Pedal xerosis  8. Hyperkeratotic lesions sub 1 & 5    Active Problems:    Cellulitis of foot       PLAN:  -Vascular surgery consult reviewed, appreciate recs. To go for angiogram and revasc in am Friday.  -Cardiology: risk stratification low / low risk procedures. Okay to proceed. -MRI and NIVS reviewed. Impressions above - OM of R 2nd digit.  Suggestive of PVD, likely severe fem-pop dx of R LE. -Abx per IM/ID teams, V/Z now. -Dressing: Betadine DSD. Changes QOD. -Plans for surgery: Neuropathic ulceration of second digit with extensive tissue loss - will likely take to OR for R 2nd digit amputation with Dr. Lacy Nichols after vascular surgery team performs agram/revasc. Plan for this weekend.  This will allow for best chances of healing amputation site.  -Will continue to follow while in hospital.      DW:Candido Nichols [facility]  Fellowship-Trained Foot and Ankle Surgeon  Diplomate, [employer] Board of Foot [employer]le Sur[employer]  [phone]

## 2021-01-21 NOTE — CONSULTS
Historical Provider, MD   Fluticasone Propionate (FLONASE ALLERGY RELIEF NA) by Nasal route   Yes Historical Provider, MD   metoprolol succinate (TOPROL XL) 25 MG extended release tablet Take 1 tablet by mouth daily 9/25/20  Yes Urbano Blair MD   furosemide (LASIX) 20 MG tablet Take 1 tablet by mouth daily 9/25/20  Yes Urbano Blair MD   sacubitril-valsartan (ENTRESTO) 24-26 MG per tablet Take 0.5 tablets by mouth 2 times daily Take a half of a tablet BID 4/2/20  Yes Urbano Blair MD   Cholecalciferol (VITAMIN D) 125 MCG (5000 UT) CAPS Take 5,000 Units by mouth   Yes Historical Provider, MD   insulin glargine (BASAGLAR KWIKPEN) 100 UNIT/ML injection pen Inject 26 Units into the skin 2 times daily 26 am   30 units in pm   Yes Historical Provider, MD   acetaminophen (TYLENOL) 325 MG tablet Take 650 mg by mouth 2 times daily as needed for Pain   Yes Historical Provider, MD   pantoprazole (PROTONIX) 40 MG tablet  6/7/17  Yes Historical Provider, MD   atorvastatin (LIPITOR) 40 MG tablet  6/7/17  Yes Historical Provider, MD   escitalopram (LEXAPRO) 20 MG tablet  6/7/17  Yes Historical Provider, MD   ferrous sulfate 325 (65 Fe) MG tablet  6/7/17  Yes Historical Provider, MD   traZODone (DESYREL) 50 MG tablet Take 50 mg by mouth nightly   Yes Historical Provider, MD   gabapentin (NEURONTIN) 300 MG capsule Take 300 mg by mouth 2 times daily.  .   Yes Historical Provider, MD   clopidogrel (PLAVIX) 75 MG tablet Take 75 mg by mouth daily   Yes Historical Provider, MD   aspirin 81 MG EC tablet Take 1 tablet by mouth daily 10/28/16  Yes Noelle Galeas MD   metFORMIN (GLUCOPHAGE) 1000 MG tablet Take 1 tablet by mouth 2 times daily (with meals) 10/28/16  Yes Noelle Galeas MD   BREO ELLIPTA 200-25 MCG/INH AEPB  6/9/17   Historical Provider, MD   albuterol sulfate  (90 BASE) MCG/ACT inhaler Inhale 2 puffs into the lungs every 6 hours as needed for Wheezing    Historical Provider, MD         Ace inhibitors and Percocet [oxycodone-acetaminophen]    Social History     Tobacco Use    Smoking status: Current Every Day Smoker     Packs/day: 1.00    Smokeless tobacco: Never Used   Substance Use Topics    Alcohol use: No     Comment: 2 cups of coffee a day             Physical Exam:  Vitals:    01/21/21 0528   BP: 110/65   Pulse: 74   Resp: 14   Temp: 97.8 °F (36.6 °C)   SpO2: 94%      Skin:  Warm and dry. No rash or bruises  HEENT:  PERRLA, EOMI  Neck:  No JVD, No thyromegaly, No carotid bruit  Cardiac:  RRR, No gallop or murmur  Lungs:  CTA, Normal percussion  Abdomen: Normal bowel sounds, no HSM, non-tender. No pulsatile masses  Extremities:rt 2nd toe ulcer, erythema rt foot, 1+ edema b lower legs,  No clubbing, cyanosis,  Pulses 1+ bilaterally  Neurological:  Moves all extremities, normal DTR    EXAMINATION:   ARTERIAL DUPLEX ULTRASOUND OF THE BILATERAL LOWER EXTREMITIES WITH SEGMENTAL   PRESSURES AND PULSE VOLUME RECORDINGS 1/20/2021 7:10 am   TECHNIQUE:   Arterial duplex MIGUEL ultrasound. Segmental pressures and PVR performed with   Doppler. COMPARISON:   None. HISTORY:   ORDERING SYSTEM PROVIDED HISTORY: Diabetic foot ulceration of the right foot   second digit with concern for critical arterial stenosis   TECHNOLOGIST PROVIDED HISTORY:   Reason for exam:->Diabetic foot ulceration of the right foot second digit   with concern for critical arterial stenosis   What reading provider will be dictating this exam?->CRC   FINDINGS:   The MIGUEL on the right is 0.67. The MIGUEL on the left is 1.19. Indices for the right lower extremity: Low thigh 0.95, calf 0.78, ankle   (posterior tibial) 0.67, ankle (dorsalis pedis) 0.64, 1st digit 0.41, 2nd   digit 0.33 and 3rd digit 0.23.  Pressure measurements were unable to be   obtained for the 4th or 5th digits due to digit size unable to accommodate   cuff.  Waveforms are detected throughout all arteries, including the 4th and   5th digits.    Indices for the left lower extremity:  Low thigh

## 2021-01-21 NOTE — PROGRESS NOTES
Pharmacy Consultation Note  (Antibiotic Dosing and Monitoring)    Initial consult date: 1/20/2021  Consulting physician: Dr. Pavithra Crocker  Drug(s): Vancomycin  Indication: Diabetic foot infection    Ht Readings from Last 1 Encounters:   01/19/21 5' 9\" (1.753 m)     Wt Readings from Last 1 Encounters:   01/19/21 245 lb (111.1 kg)     Age/  Gender IBW DW  Allergy Information   63 y.o.   female 66.2 kg 111.1 kg  Ace inhibitors and Percocet [oxycodone-acetaminophen]          Date  Tmax WBC BUN/CR UOP  (mL/kg/hr) Drug/Dose Time   Given Level(s)   (Time) Comments   1/19  (#1) afebrile 14.1 32/1.5 -- Vancomycin 2000 mg IV x 1 2225 1/20  (#2) afebrile 8.8 31/1.4 -- Vancomycin 1750 mg IV q24hr 2222 1/21  (#3) afebrile 6.6 27/1.3 -- Vancomycin 1750 mg IV q24hr <2230>       (#4)             (#5)             (#6)             (#7)             Estimated Creatinine Clearance: 64 mL/min (A) (based on SCr of 1.3 mg/dL (H)). UOP over the past 24 hours:       Intake/Output Summary (Last 24 hours) at 1/21/2021 0825  Last data filed at 1/21/2021 0630  Gross per 24 hour   Intake 500 ml   Output 2300 ml   Net -1800 ml     Other anti-infectives: Anti-infective Dose Date Initiated Date Stopped   Pip/tazo 3.375g IV q8hr 1/20 1/20   Cefepime 2g IV q12hr 1/20      Cultures:  available culture and sensitivity results were reviewed in EPIC  Cultures sent and are pending. Culture Date Result    Blood cx 1 1/19    Blood cx 2 1/19    Wound cx  (R foot) 1/20    MRSA nares 1/20                Assessment:  · Consulted by Dr. Pavithra Crocker to dose/monitor vancomycin  · Goal trough level:  15-20 mcg/mL  · Pt is a 63 yoF who presented from home with R diabetic foot infection and cellulitis. MRI ordered to rule out osteomyelitis. · Serum creatinine today: 1.3 mg/dL; CrCl ~ 60 mL/min; baseline Scr unknown (was 0.8 - 1 in 2016).     Plan:  · Vancomycin 1750 mg IV q24hr  · Level prior to 4th dose  · Follow renal function  · Pharmacist will follow and monitor/adjust dosing as necessary      Thank you for the consult,    Connie Ronquillo PharmD, BCPS 1/21/2021 8:25 AM   Ext: [phone]

## 2021-01-22 ENCOUNTER — APPOINTMENT (OUTPATIENT)
Dept: INTERVENTIONAL RADIOLOGY/VASCULAR | Age: 56
DRG: 253 | End: 2021-01-22
Payer: MEDICARE

## 2021-01-22 LAB
ALBUMIN SERPL-MCNC: 3.5 G/DL (ref 3.5–5.2)
ALP BLD-CCNC: 102 U/L (ref 35–104)
ALT SERPL-CCNC: 12 U/L (ref 0–32)
ANION GAP SERPL CALCULATED.3IONS-SCNC: 10 MMOL/L (ref 7–16)
AST SERPL-CCNC: 12 U/L (ref 0–31)
BASOPHILS ABSOLUTE: 0.08 E9/L (ref 0–0.2)
BASOPHILS RELATIVE PERCENT: 0.9 % (ref 0–2)
BILIRUB SERPL-MCNC: 0.3 MG/DL (ref 0–1.2)
BUN BLDV-MCNC: 28 MG/DL (ref 6–20)
CALCIUM SERPL-MCNC: 9.4 MG/DL (ref 8.6–10.2)
CHLORIDE BLD-SCNC: 99 MMOL/L (ref 98–107)
CO2: 25 MMOL/L (ref 22–29)
CREAT SERPL-MCNC: 1.2 MG/DL (ref 0.5–1)
EOSINOPHILS ABSOLUTE: 0.42 E9/L (ref 0.05–0.5)
EOSINOPHILS RELATIVE PERCENT: 4.7 % (ref 0–6)
GFR AFRICAN AMERICAN: 56
GFR NON-AFRICAN AMERICAN: 46 ML/MIN/1.73
GLUCOSE BLD-MCNC: 223 MG/DL (ref 74–99)
HCT VFR BLD CALC: 40.2 % (ref 34–48)
HEMOGLOBIN: 12.8 G/DL (ref 11.5–15.5)
IMMATURE GRANULOCYTES #: 0.02 E9/L
IMMATURE GRANULOCYTES %: 0.2 % (ref 0–5)
LYMPHOCYTES ABSOLUTE: 2.32 E9/L (ref 1.5–4)
LYMPHOCYTES RELATIVE PERCENT: 26.1 % (ref 20–42)
MCH RBC QN AUTO: 27.6 PG (ref 26–35)
MCHC RBC AUTO-ENTMCNC: 31.8 % (ref 32–34.5)
MCV RBC AUTO: 86.8 FL (ref 80–99.9)
METER GLUCOSE: 202 MG/DL (ref 74–99)
METER GLUCOSE: 230 MG/DL (ref 74–99)
METER GLUCOSE: 239 MG/DL (ref 74–99)
METER GLUCOSE: 391 MG/DL (ref 74–99)
MONOCYTES ABSOLUTE: 0.68 E9/L (ref 0.1–0.95)
MONOCYTES RELATIVE PERCENT: 7.7 % (ref 2–12)
MRSA CULTURE ONLY: NORMAL
NEUTROPHILS ABSOLUTE: 5.36 E9/L (ref 1.8–7.3)
NEUTROPHILS RELATIVE PERCENT: 60.4 % (ref 43–80)
ORGANISM: ABNORMAL
PDW BLD-RTO: 13.3 FL (ref 11.5–15)
PLATELET # BLD: 394 E9/L (ref 130–450)
PMV BLD AUTO: 9.5 FL (ref 7–12)
POTASSIUM SERPL-SCNC: 4.3 MMOL/L (ref 3.5–5)
RBC # BLD: 4.63 E12/L (ref 3.5–5.5)
SODIUM BLD-SCNC: 134 MMOL/L (ref 132–146)
TOTAL PROTEIN: 7.6 G/DL (ref 6.4–8.3)
WBC # BLD: 8.9 E9/L (ref 4.5–11.5)
WOUND/ABSCESS: ABNORMAL

## 2021-01-22 PROCEDURE — 1200000000 HC SEMI PRIVATE

## 2021-01-22 PROCEDURE — 75710 ARTERY X-RAYS ARM/LEG: CPT | Performed by: THORACIC SURGERY (CARDIOTHORACIC VASCULAR SURGERY)

## 2021-01-22 PROCEDURE — 2580000003 HC RX 258: Performed by: INTERNAL MEDICINE

## 2021-01-22 PROCEDURE — 047K3ZZ DILATION OF RIGHT FEMORAL ARTERY, PERCUTANEOUS APPROACH: ICD-10-PCS | Performed by: THORACIC SURGERY (CARDIOTHORACIC VASCULAR SURGERY)

## 2021-01-22 PROCEDURE — 80053 COMPREHEN METABOLIC PANEL: CPT

## 2021-01-22 PROCEDURE — 6370000000 HC RX 637 (ALT 250 FOR IP)

## 2021-01-22 PROCEDURE — 7100000011 HC PHASE II RECOVERY - ADDTL 15 MIN

## 2021-01-22 PROCEDURE — 7100000010 HC PHASE II RECOVERY - FIRST 15 MIN

## 2021-01-22 PROCEDURE — 047C3ZZ DILATION OF RIGHT COMMON ILIAC ARTERY, PERCUTANEOUS APPROACH: ICD-10-PCS | Performed by: THORACIC SURGERY (CARDIOTHORACIC VASCULAR SURGERY)

## 2021-01-22 PROCEDURE — 6360000002 HC RX W HCPCS: Performed by: THORACIC SURGERY (CARDIOTHORACIC VASCULAR SURGERY)

## 2021-01-22 PROCEDURE — 6360000002 HC RX W HCPCS: Performed by: INTERNAL MEDICINE

## 2021-01-22 PROCEDURE — 6370000000 HC RX 637 (ALT 250 FOR IP): Performed by: NURSE PRACTITIONER

## 2021-01-22 PROCEDURE — 94640 AIRWAY INHALATION TREATMENT: CPT

## 2021-01-22 PROCEDURE — 36415 COLL VENOUS BLD VENIPUNCTURE: CPT

## 2021-01-22 PROCEDURE — 6360000004 HC RX CONTRAST MEDICATION: Performed by: THORACIC SURGERY (CARDIOTHORACIC VASCULAR SURGERY)

## 2021-01-22 PROCEDURE — 6360000002 HC RX W HCPCS: Performed by: CLINICAL NURSE SPECIALIST

## 2021-01-22 PROCEDURE — 6370000000 HC RX 637 (ALT 250 FOR IP): Performed by: INTERNAL MEDICINE

## 2021-01-22 PROCEDURE — B41F1ZZ FLUOROSCOPY OF RIGHT LOWER EXTREMITY ARTERIES USING LOW OSMOLAR CONTRAST: ICD-10-PCS | Performed by: THORACIC SURGERY (CARDIOTHORACIC VASCULAR SURGERY)

## 2021-01-22 PROCEDURE — 047D3ZZ DILATION OF LEFT COMMON ILIAC ARTERY, PERCUTANEOUS APPROACH: ICD-10-PCS | Performed by: THORACIC SURGERY (CARDIOTHORACIC VASCULAR SURGERY)

## 2021-01-22 PROCEDURE — C1894 INTRO/SHEATH, NON-LASER: HCPCS

## 2021-01-22 PROCEDURE — 2580000003 HC RX 258: Performed by: CLINICAL NURSE SPECIALIST

## 2021-01-22 PROCEDURE — 85025 COMPLETE CBC W/AUTO DIFF WBC: CPT

## 2021-01-22 PROCEDURE — 82962 GLUCOSE BLOOD TEST: CPT

## 2021-01-22 PROCEDURE — 37224 HC FEM POP TERRITORY PLASTY: CPT | Performed by: THORACIC SURGERY (CARDIOTHORACIC VASCULAR SURGERY)

## 2021-01-22 PROCEDURE — 047L3ZZ DILATION OF LEFT FEMORAL ARTERY, PERCUTANEOUS APPROACH: ICD-10-PCS | Performed by: THORACIC SURGERY (CARDIOTHORACIC VASCULAR SURGERY)

## 2021-01-22 RX ORDER — INSULIN GLARGINE 100 [IU]/ML
35 INJECTION, SOLUTION SUBCUTANEOUS 2 TIMES DAILY
Status: DISCONTINUED | OUTPATIENT
Start: 2021-01-22 | End: 2021-01-25 | Stop reason: HOSPADM

## 2021-01-22 RX ORDER — MIDAZOLAM HYDROCHLORIDE 1 MG/ML
INJECTION INTRAMUSCULAR; INTRAVENOUS
Status: COMPLETED | OUTPATIENT
Start: 2021-01-22 | End: 2021-01-22

## 2021-01-22 RX ORDER — FENTANYL CITRATE 50 UG/ML
INJECTION, SOLUTION INTRAMUSCULAR; INTRAVENOUS
Status: COMPLETED | OUTPATIENT
Start: 2021-01-22 | End: 2021-01-22

## 2021-01-22 RX ORDER — IODIXANOL 320 MG/ML
50 INJECTION, SOLUTION INTRAVASCULAR
Status: COMPLETED | OUTPATIENT
Start: 2021-01-22 | End: 2021-01-22

## 2021-01-22 RX ORDER — HEPARIN SODIUM 5000 [USP'U]/ML
INJECTION, SOLUTION INTRAVENOUS; SUBCUTANEOUS
Status: COMPLETED | OUTPATIENT
Start: 2021-01-22 | End: 2021-01-22

## 2021-01-22 RX ADMIN — ESCITALOPRAM OXALATE 20 MG: 10 TABLET ORAL at 11:59

## 2021-01-22 RX ADMIN — GABAPENTIN 300 MG: 300 CAPSULE ORAL at 21:23

## 2021-01-22 RX ADMIN — METOPROLOL SUCCINATE 25 MG: 25 TABLET, EXTENDED RELEASE ORAL at 08:26

## 2021-01-22 RX ADMIN — IODIXANOL 50 ML: 320 INJECTION, SOLUTION INTRAVASCULAR at 09:43

## 2021-01-22 RX ADMIN — CEFEPIME 2000 MG: 2 INJECTION, POWDER, FOR SOLUTION INTRAMUSCULAR; INTRAVENOUS at 17:04

## 2021-01-22 RX ADMIN — CYANOCOBALAMIN TAB 1000 MCG 1000 MCG: 1000 TAB at 11:59

## 2021-01-22 RX ADMIN — INSULIN GLARGINE 30 UNITS: 100 INJECTION, SOLUTION SUBCUTANEOUS at 08:26

## 2021-01-22 RX ADMIN — OXYCODONE HYDROCHLORIDE AND ACETAMINOPHEN 1000 MG: 500 TABLET ORAL at 11:58

## 2021-01-22 RX ADMIN — DESMOPRESSIN ACETATE 40 MG: 0.2 TABLET ORAL at 21:23

## 2021-01-22 RX ADMIN — INSULIN LISPRO 2 UNITS: 100 INJECTION, SOLUTION INTRAVENOUS; SUBCUTANEOUS at 12:00

## 2021-01-22 RX ADMIN — INSULIN LISPRO 3 UNITS: 100 INJECTION, SOLUTION INTRAVENOUS; SUBCUTANEOUS at 21:23

## 2021-01-22 RX ADMIN — SACUBITRIL AND VALSARTAN 0.5 TABLET: 24; 26 TABLET, FILM COATED ORAL at 11:58

## 2021-01-22 RX ADMIN — VANCOMYCIN HYDROCHLORIDE 1750 MG: 5 INJECTION, POWDER, LYOPHILIZED, FOR SOLUTION INTRAVENOUS at 21:30

## 2021-01-22 RX ADMIN — TRAZODONE HYDROCHLORIDE 50 MG: 50 TABLET ORAL at 21:23

## 2021-01-22 RX ADMIN — GABAPENTIN 300 MG: 300 CAPSULE ORAL at 11:59

## 2021-01-22 RX ADMIN — FERROUS SULFATE TAB 325 MG (65 MG ELEMENTAL FE) 325 MG: 325 (65 FE) TAB at 11:59

## 2021-01-22 RX ADMIN — ARFORMOTEROL TARTRATE 15 MCG: 15 SOLUTION RESPIRATORY (INHALATION) at 05:29

## 2021-01-22 RX ADMIN — BUDESONIDE 500 MCG: 0.5 INHALANT RESPIRATORY (INHALATION) at 05:29

## 2021-01-22 RX ADMIN — BUDESONIDE 500 MCG: 0.5 INHALANT RESPIRATORY (INHALATION) at 16:04

## 2021-01-22 RX ADMIN — SACUBITRIL AND VALSARTAN 0.5 TABLET: 24; 26 TABLET, FILM COATED ORAL at 21:22

## 2021-01-22 RX ADMIN — ARFORMOTEROL TARTRATE 15 MCG: 15 SOLUTION RESPIRATORY (INHALATION) at 16:04

## 2021-01-22 RX ADMIN — MIDAZOLAM 1 MG: 1 INJECTION INTRAMUSCULAR; INTRAVENOUS at 09:18

## 2021-01-22 RX ADMIN — PANTOPRAZOLE SODIUM 40 MG: 40 TABLET, DELAYED RELEASE ORAL at 06:42

## 2021-01-22 RX ADMIN — SODIUM CHLORIDE: 9 INJECTION, SOLUTION INTRAVENOUS at 21:22

## 2021-01-22 RX ADMIN — INSULIN GLARGINE 35 UNITS: 100 INJECTION, SOLUTION SUBCUTANEOUS at 21:23

## 2021-01-22 RX ADMIN — Medication 50 MG: at 11:59

## 2021-01-22 RX ADMIN — FENTANYL CITRATE 25 MCG: 50 INJECTION, SOLUTION INTRAMUSCULAR; INTRAVENOUS at 09:24

## 2021-01-22 RX ADMIN — CEFEPIME 2000 MG: 2 INJECTION, POWDER, FOR SOLUTION INTRAMUSCULAR; INTRAVENOUS at 05:43

## 2021-01-22 RX ADMIN — FENTANYL CITRATE 25 MCG: 50 INJECTION, SOLUTION INTRAMUSCULAR; INTRAVENOUS at 09:18

## 2021-01-22 RX ADMIN — HEPARIN SODIUM 8000 UNITS: 5000 INJECTION, SOLUTION INTRAVENOUS; SUBCUTANEOUS at 09:34

## 2021-01-22 RX ADMIN — MIDAZOLAM 1 MG: 1 INJECTION INTRAMUSCULAR; INTRAVENOUS at 09:24

## 2021-01-22 RX ADMIN — INSULIN LISPRO 2 UNITS: 100 INJECTION, SOLUTION INTRAVENOUS; SUBCUTANEOUS at 17:06

## 2021-01-22 NOTE — PROGRESS NOTES
Pharmacy Consultation Note  (Antibiotic Dosing and Monitoring)    Initial consult date: 1/20/2021  Consulting physician: Dr. Pavithra Crocker  Drug(s): Vancomycin  Indication: Diabetic foot infection    Ht Readings from Last 1 Encounters:   01/19/21 5' 9\" (1.753 m)     Wt Readings from Last 1 Encounters:   01/19/21 245 lb (111.1 kg)     Age/  Gender IBW DW  Allergy Information   63 y.o.   female 66.2 kg 111.1 kg  Ace inhibitors and Percocet [oxycodone-acetaminophen]          Date  Tmax WBC BUN/CR UOP  (mL/kg/hr) Drug/Dose Time   Given Level(s)   (Time) Comments   1/19  (#1) afebrile 14.1 32/1.5 -- Vancomycin 2000 mg IV x 1 2225 1/20  (#2) afebrile 8.8 31/1.4 -- Vancomycin 1750 mg IV q24hr 2222 1/21  (#3) afebrile 6.6 27/1.3 -- Vancomycin 1750 mg IV q24hr 2300     1/22  (#4) afebrile 8.9 28/1.2 -- Vancomycin 1750 mg IV q24hr <2230> Trough @ 2200 Please hold the following dose if level is >20 mcg/mL     (#5)             (#6)             (#7)             Estimated Creatinine Clearance: 70 mL/min (A) (based on SCr of 1.2 mg/dL (H)). UOP over the past 24 hours:       Intake/Output Summary (Last 24 hours) at 1/22/2021 1314  Last data filed at 1/22/2021 0800  Gross per 24 hour   Intake 370 ml   Output 750 ml   Net -380 ml     Other anti-infectives: Anti-infective Dose Date Initiated Date Stopped   Pip/tazo 3.375g IV q8hr 1/20 1/20   Cefepime 2g IV q12hr 1/20      Cultures:  available culture and sensitivity results were reviewed in EPIC  Cultures sent and are pending. Culture Date Result    Blood cx 1 1/19 NGTD   Blood cx 2 1/19 NGTD   Wound cx  (R foot) 1/20 MRSA   MRSA nares 1/20 Not isolated     Assessment:  · Consulted by Dr. Pavithra Crocker to dose/monitor vancomycin  · Goal trough level:  15-20 mcg/mL  · Pt is a 63 yoF who presented from home with R diabetic foot infection and cellulitis. MRI ordered to rule out osteomyelitis.    · Serum creatinine today: 1.2 mg/dL; CrCl ~ 70 mL/min; baseline Scr unknown (was 0.8 - 1 in 2016).    Plan:  · Vancomycin 1750 mg IV q24hr  · Trough tonight @ 2200; Please hold the following dose if level is >20 mcg/mL  · Follow renal function  · Pharmacist will follow and monitor/adjust dosing as necessary      Thank you for the consult,    Lindsey Moreno, PharmD 1/22/2021 1:18 PM   [phone]

## 2021-01-22 NOTE — PROGRESS NOTES
Podiatry Resident Progress Note    SUBJECTIVE:   Patient is seen bedside for follow up of right second digit infection with open wounds. No overnight events noted. Doing well after procedure. No new complaints. Patient denies fever, chills, nausea, vomiting, shortness of breath, and chest pain. Past Medical History:   Diagnosis Date    Anxiety     CAD (coronary artery disease)     x5 stents    Cardiomy[facility]     Cerebral artery occlusion with cerebral infarction ([facility]     COPD (chronic obstructive pulmonary disease) ([facility]     Depression     Diabetes mellitus ([facility]     IDDM    Hx of cardiovascular stress test 3/22/2016    Lexiscan    Hyperlipidemia     Nonrheumatic mitral (valve) insufficiency     Nonrheumatic tricuspid (valve) insufficiency     Sleep apnea     cpap    Smoker     current 1PPD X35yrs        Past Surgical History:   Procedure Laterality Date    ABDOMEN SURGERY      ABDOMINAL EXPLORATION SURGERY N/A 6-5-16    Excision perforated mid body gastric ulcer, primary closure omentum patch    BACK SURGERY      CARDIAC CATHETERIZATION  05/03/2018    Dr. Teddy Bain CATH LAB PROCEDURE  05/03/2018    Dr. Broderick Hollingsworth multiple PCIs     KNEE ARTHROPLASTY      TRANSESOPHAGEAL ECHOCARDIOGRAM  10/28/2016    Dr. Broderick Hollingsworth r/o embolic source         Family History   Problem Relation Age of Onset    Heart Attack Father         Social History     Tobacco Use    Smoking status: Current Every Day Smoker     Packs/day: 1.00    Smokeless tobacco: Never Used   Substance Use Topics    Alcohol use: No     Comment: 2 cups of coffee a day         Prior to Admission medications    Medication Sig Start Date End Date Taking?  Authorizing Provider   canagliflozin (INVOKANA) 300 MG TABS tablet Take 300 mg by mouth every morning (before breakfast)   Yes Historical Provider, MD   Biotin 46073 MCG TABS Take by mouth   Yes Historical Provider, MD   zinc 50 MG TABS tablet Take 50 mg by mouth daily   Yes Historical Provider, MD   vitamin B-12 (CYANOCOBALAMIN) 1000 MCG tablet Take 1,000 mcg by mouth daily   Yes Historical Provider, MD   Turmeric (QC TUMERIC COMPLEX) 500 MG CAPS Take by mouth   Yes Historical Provider, MD   Ascorbic Acid (VITAMIN C) 1000 MG tablet Take 1,000 mg by mouth daily   Yes Historical Provider, MD   Fluticasone Propionate (FLONASE ALLERGY RELIEF NA) by Nasal route   Yes Historical Provider, MD   metoprolol succinate (TOPROL XL) 25 MG extended release tablet Take 1 tablet by mouth daily 9/25/20  Yes Eliza Luna MD   furosemide (LASIX) 20 MG tablet Take 1 tablet by mouth daily 9/25/20  Yes Eliza Luna MD   sacubitril-valsartan (ENTRESTO) 24-26 MG per tablet Take 0.5 tablets by mouth 2 times daily Take a half of a tablet BID 4/2/20  Yes Eliza Luna MD   Cholecalciferol (VITAMIN D) 125 MCG (5000 UT) CAPS Take 5,000 Units by mouth   Yes Historical Provider, MD   insulin glargine (BASAGLAR KWIKPEN) 100 UNIT/ML injection pen Inject 26 Units into the skin 2 times daily 26 am   30 units in pm   Yes Historical Provider, MD   acetaminophen (TYLENOL) 325 MG tablet Take 650 mg by mouth 2 times daily as needed for Pain   Yes Historical Provider, MD   pantoprazole (PROTONIX) 40 MG tablet  6/7/17  Yes Historical Provider, MD   atorvastatin (LIPITOR) 40 MG tablet  6/7/17  Yes Historical Provider, MD   escitalopram (LEXAPRO) 20 MG tablet  6/7/17  Yes Historical Provider, MD   ferrous sulfate 325 (65 Fe) MG tablet  6/7/17  Yes Historical Provider, MD   traZODone (DESYREL) 50 MG tablet Take 50 mg by mouth nightly   Yes Historical Provider, MD   gabapentin (NEURONTIN) 300 MG capsule Take 300 mg by mouth 2 times daily.  .   Yes Historical Provider, MD   clopidogrel (PLAVIX) 75 MG tablet Take 75 mg by mouth daily   Yes Historical Provider, MD   aspirin 81 MG EC tablet Take 1 tablet by mouth daily 10/28/16  Yes Tomy Wayne MD   metFORMIN (GLUCOPHAGE) 1000 MG tablet Take 1 tablet by mouth 2 times daily (with meals) 10/28/16  Yes Tomy Wayne MD   BREO ELLIPTA 200-25 MCG/INH AEPB  6/9/17   Historical Provider, MD   albuterol sulfate  (90 BASE) MCG/ACT inhaler Inhale 2 puffs into the lungs every 6 hours as needed for Wheezing    Historical Provider, MD      Ace inhibitors and Percocet [oxycodone-acetaminophen]       OBJECTIVE:      VITALS:  Vitals:    01/22/21 0557   BP: 136/76   Pulse: 63   Resp: 16   Temp: 97.6 °F (36.4 °C)   SpO2: 94%        LABS:   Recent Labs     01/19/21  2103 01/20/21  0423 01/21/21  0458 01/22/21  0345   WBC 14.4* 8.8 6.6 8.9   HGB 13.8 12.8 12.9 12.8   HCT 43.9 40.1 40.4 40.2    378 389 394   CRP 1.3*  --   --   --    SEDRATE 44*  --   --   --    ASO  --  313*  --   --        PHYSICAL EXAM:  Vascular:    DP / PT pulses nonpalpable bilaterally. CFT moderately sluggish to all digits. No pitting edema present bilaterally. Warm to warm temp gradient along right LE. Pedal hair absent. Neurologic:    Protective sensation is decreased in distal lower extremity bilaterally - verified using [place] touch test.    Dermatologic:    Open lesion are present on R second digit x2 - distal tip and lateral PIPJ. Both wounds show firm hyperkeratosis surrounding with eschar formation. Distal measures approximately 0.75cm in diameter, nummular and lateral measures approximately 0.5cm in diameter, nummular. No drainage. No malodor noted. No lucho pus appreciated on compression. Distal wound does probe to bone - distal phalanx. Hyperkeratotic lesions sub 1 and 5 bilat. Erythema present to the level of the MPJ, additionally pretibial area - appears lessened today. Trophic changes of skin. Webspaces are dry and clean. Clinical images shown below:                 Musculoskeletal:   No pain on R 2nd digit, wounds, or surrounding area. Digital contracture of 2-5 bilaterally. Muscle strength 5/5 to all lower extremity groups tested bilaterally. Active/passive ROM is WNL and not painful in all lower extremity joints tested. Able to wiggle digits. No pain on compression of calves. IMAGING:  Xr Foot Right (min 3 Views)    Result Date: 1/19/2021  1. No acute osseous findings about the right foot. No erosive changes seen. There appears to be some soft tissue irregularity to the dorsal and palmar aspect of the forefoot on the lateral view. 2. Mild right large toe osteoarthritis. Calcaneal enthesophytes. 3.  Arteriosclerosis. Vl Lower Extremity Arterial Segmental Pressures W Ppg Bilateral    Result Date: 1/20/2021  1. MIGUEL on the right is 0.67, which is abnormal, suggesting severe-moderate peripheral arterial disease. 2. MIGUEL on the left is 1.19 (normal). Mri Foot Right Wo Contrast    Result Date: 1/20/2021  1. Acute osteomyelitis throughout the distal phalanx 2nd digit. 2. Mild marrow edema without T1 signal change in the middle phalanx 2nd digit. Findings could be reactive or due to early osteomyelitis. 3. Ulcer in the distal 2nd digit with cellulitis. No abscess or tenosynovitis. ASSESEMENT:  1. Right second digit wounds (x2) POA, clinically infected  2. Acute osteomyelitis of digit  3. DM II with peripheral neuropathy  4. PVD - s/p revascularization with Dr. Urmila Garcia (DOS 1/22)  5. Tobacco abuse with associated vascular changes  6. Pedal xerosis  7. Hyperkeratotic lesions sub 1 & 5    Active Problems:    Cellulitis of foot     PLAN:  -Vascular surgery consult reviewed, appreciate recs. Revasc performed today.  -Cardiology: risk stratification low / low risk procedures. Okay to proceed. -MRI and NIVS reviewed. Impressions above - OM of R 2nd digit. Suggestive of PVD, likely severe fem-pop dx of R LE. -Abx per IM/ID teams, V/Z now. Failed previous PO Keflex / Doxy. -Dressing: Betadine DSD. Changes QOD.   -Plans for surgery: Neuropathic ulceration of second digit with extensive tissue loss - will likely take to OR for R 2nd digit amputation.  Plan for OR this weekend at Dr. Dee Reynolds discretion.  -Will continue to follow while in hospital.    DW:Candido Selby, [facility] Ur[facility] CareFAS  Fellowship-Trained Foot and Ankle Surgeon  Diplomate, American Board of Foot and Ankle Surgeons  [phone]

## 2021-01-22 NOTE — CARE COORDINATION
CM note; Attempted to meet with patient again today, did not awaken to CMs voice, sleeping with blankets over her head but was wiggling her right foot. Per chart review patient lives alone, hx of [facility] and [facility]xygen thru [address], patient also wearing cPap in hospital.  Patient if for possible toe amputation this weekend, is currently on IV antibiotics, CM will follow for discharge needs.

## 2021-01-22 NOTE — PROGRESS NOTES
[facility]  [facility]  Progress Note    NAME: Brianna Franco  MR:  [id number]  :   1965  DATE OF SERVICE:21    This is a face to face encounter with Brianna Franco 63 y.o. female on 21  ID following for   Chief Complaint   Patient presents with    Wound Check     Wound check to the right foot. Patient told to come to the ER by Dr Ashwini Anaya. SUBJECTIVE:  IN BED HAS NO C/O  S/P ANGIO  NO F/C/N/V/D  FOOT DRESSED  Patient is tolerating medications. No reported adverse drug reactions. Review of systems:  As stated above in the chief complaint, otherwise negative.     Medications:  Scheduled Meds:   insulin glargine  30 Units Subcutaneous BID    Arformoterol Tartrate  15 mcg Nebulization BID    And    budesonide  0.5 mg Nebulization BID    vitamin C  1,000 mg Oral Daily    escitalopram  20 mg Oral Daily    vitamin B-12  1,000 mcg Oral Daily    zinc sulfate  50 mg Oral Daily    traZODone  50 mg Oral Nightly    insulin lispro  0-6 Units Subcutaneous TID WC    insulin lispro  0-3 Units Subcutaneous Nightly    vancomycin  1,750 mg Intravenous Q24H    cefepime  2,000 mg Intravenous Q12H    [Held by provider] aspirin  81 mg Oral Daily    atorvastatin  40 mg Oral Nightly    [Held by provider] clopidogrel  75 mg Oral Daily    sacubitril-valsartan  0.5 tablet Oral BID    pantoprazole  40 mg Oral QAM AC    metoprolol succinate  25 mg Oral Daily    gabapentin  300 mg Oral BID    furosemide  20 mg Oral Daily    ferrous sulfate  325 mg Oral Daily with breakfast    sodium chloride flush  10 mL Intravenous 2 times per day    enoxaparin  40 mg Subcutaneous Daily     Continuous Infusions:   sodium chloride Stopped (21 2340)    dextrose       PRN Meds:glucose, dextrose, glucagon (rDNA), dextrose, sodium chloride flush, acetaminophen **OR** acetaminophen    OBJECTIVE:  /62   Pulse 70   Temp 98 °F (36.7 °C) (Oral)   Resp 18   Ht 5' 9\" (1.753 m) Wt 245 lb (111.1 kg)   SpO2 96%   BMI 36.18 kg/m²   Temp  Av.8 °F (36.6 °C)  Min: 97.4 °F (36.3 °C)  Max: 98 °F (36.7 °C)  Constitutional:  The patient is awake, alert, and oriented. SUPINE POST ANGIO  Skin:    Warm and dry. HEENT:    AT/NC   Chest:   No use of accessory muscles to breathe. Symmetrical expansion. Cardiovascular:  S1 and S2 are rhythmic and regular. Abdomen:   Positive bowel sounds to auscultation. Benign to palpation. Extremities:     FOOT DRESSED  CNS    AAxO   Lines: pIV        Radiology:  Laboratory and Tests Review:  Lab Results   Component Value Date    WBC 8.9 2021    WBC 6.6 2021    WBC 8.8 2021    HGB 12.8 2021    HCT 40.2 2021    MCV 86.8 2021     2021     No results found for: [facility]  Lab Results   Component Value Date    ALT 12 2021    AST 12 2021    ALKPHOS 102 2021    BILITOT 0.3 2021     Lab Results   Component Value Date     2021    K 4.3 2021    CL 99 2021    CO2 25 2021    BUN 28 2021    CREATININE 1.2 2021    CREATININE 1.3 2021    CREATININE 1.4 2021    GFRAA 56 2021    LABGLOM 46 2021    GLUCOSE 223 2021    GLUCOSE 179 2011    PROT 7.6 2021    LABALBU 3.5 2021    LABALBU 4.2 2011    CALCIUM 9.4 2021    BILITOT 0.3 2021    ALKPHOS 102 2021    AST 12 2021    ALT 12 2021     Lab Results   Component Value Date    CRP 1.3 (H) 2021    CRP 2.7 (H) 2016    CRP 4.4 (H) 2016     Lab Results   Component Value Date    SEDRATE 44 (H) 2021    SEDRATE 86 (H) 2016    SEDRATE 95 (H) 2016       Microbiology:   No results for input(s): COVID19 in the last 72 hours.   Lab Results   Component Value Date    BLOODCULT2 24 Hours no growth 2021    BLOODCULT2 5 Days- no growth 2016    ORG Staphylococcus aureus 2021    ORG Staphylococcus aureus 06/24/2016    ORG Candida glabrata 06/05/2016    ORG Candida albicans 06/05/2016    ORG Candida species 06/05/2016     WOUND/ABSCESS   Date Value Ref Range Status   01/20/2021   Final    Light growth  Methicillin resistant Staph aureus isolated. Most Methicillin  resistant Staphylococcus are usually resistant to multiple  antibiotics including other B-Lactams, Aminoglycosides,  Macrolides, Clindamycin and Tetracycline. Contact isolation  is indicated. 06/24/2016   Final    Heavy growth  Methicillin resistant Staph aureus isolated. Most Methicillin  resistant Staphylococcus are usually resistant to multiple  antibiotics including other B-Lactams, Aminoglycosides,  Macrolides, Clindamycin and Tetracycline. Contact isolation  is indicated. 07/28/2015 Moderate growth  Final   07/28/2015   Final    Moderate growth  Methicillin resistant Staph aureus isolated. Most Methicillin  resistant Staphylococcus are usually resistant to multiple  antibiotics including other B-Lactams, Aminoglycosides,  Macrolides, Clindamycin and Tetracycline. Contact isolation  is indicated. This isolate is presumed to be resistant based on the  detection of inducible Clindamycin resistance. Clindamycin  may still be effective in some patients. No results found for: RESPSMEAR  No results found for: MPNEUMO, CLAMYDCU, LABLEGI, AFBCX, FUNGSM, LABFUNG    MRSA Culture Only   Date Value Ref Range Status   01/20/2021 Methicillin resistant Staph aureus not isolated  Final      ASSESSMENT/PLAN:  Leukocytosis  Cellulitis right foot MRSA  Dfu/infection right second toe          vancomycin (VANCOCIN) 1,750 mg in dextrose 5 % 500 mL IVPB, Q24H FOLLOWED BY PHARMACY     cefepime (MAXIPIME) 2000 mg IVPB extended (mini-bag), Q12H     S.P Angiogram and revascularization      · Monitor labs    Imaging and labs were reviewed per medical records. The patient was educated about the diagnosis, prognosis, indications, risks and benefits of treatment.

## 2021-01-22 NOTE — PROGRESS NOTES
Internal Medicine Progress Note    [facility]    Luis Orosco. Jasmin Salcido., F.A.C.O.I. Leigh Wyman D.O., ADYA.CNoeOMARLON Morris D.O. Saundra Pretty, MSN, APRN, NP-C  Thomas Edmondson. Maylin Prajapati, MSN, APRN-CNP     Primary Care Physician: Ramon Peralta MD   Admitting Physician:  Jimbo Cerda DO  Admission date and time: 1/19/2021  8:10 PM    Room:  [facility]0337-  Admitting diagnosis: Cellulitis of foot [L03.119]    Patient Name: Margie Curry  MRN: [id number]    Date of Service: 1/22/2021     Subjective:  Jess Mckeon is a 63 y.o. female who was seen and examined today,1/22/2021, at the bedside. Jess Mckeon is for vascular procedure today. States pain in the affected extremity is well controlled at the present time. Tolerating antibiotic therapy. No family present    Review of System:   Constitutional:   Denies fever or chills, weight loss or gain, admits to fatigue  HEENT:   Denies ear pain, sore throat, sinus or eye problems. Cardiovascular:   Denies any chest pain, or palpitations. Admits to extensive prior cardiac history with no recent intervention. Respiratory:   Denies shortness of breath, coughing, sputum production, hemoptysis, or wheezing. Gastrointestinal:   Denies nausea, vomiting, diarrhea, or constipation. Denies any abdominal pain. Genitourinary:    Denies any urgency, frequency, hematuria. Voiding  without difficulty. Extremities:   Denies lower extremity swelling, edema or cyanosis. Neurology:    Denies any headache or focal neurological deficits, Denies generalized weakness or memory difficulty. Admits to numbness and tingling of the right lower extremity. Psch:   Denies being anxious or depressed. Musculoskeletal:    Denies  myalgias, joint complaints or back pain. Integumentary:   Admits to  Right foot wound. Tattoos Denies bruising. Hematologic/Lymphatic:  Denies bruising or bleeding.     Physical Exam:  No intake/output data recorded. Intake/Output Summary (Last 24 hours) at 1/22/2021 1437  Last data filed at 1/22/2021 0800  Gross per 24 hour   Intake 370 ml   Output 750 ml   Net -380 ml   I/O last 3 completed shifts: In: 550 [P.O.:540; I.V.:10]  Out: 750 [Urine:750]  Patient Vitals for the past 96 hrs (Last 3 readings):   Weight   01/19/21 2358 245 lb (111.1 kg)   01/19/21 1933 245 lb (111.1 kg)     Vital Signs:   Blood pressure 125/62, pulse 70, temperature 98 °F (36.7 °C), temperature source Oral, resp. rate 18, height 5' 9\" (1.753 m), weight 245 lb (111.1 kg), SpO2 96 %. General appearance:  Alert, responsive, oriented to person, place, and time. Comfortable appearing, no distress. Head:  Normocephalic. No masses, lesions or tenderness. Eyes:  PERRLA. EOMI. Sclera clear. Buccal mucosa moist.  ENT:  Ears normal. Mucosa normal.  Neck:    Supple. Trachea midline. No thyromegaly. No JVD. No bruits. Heart:    Rhythm regular. Rate controlled. Systolic murmur. Lungs:    Midline prior sternotomy incision scar noted. Symmetrical.  Diminished bibasilar. Lungs clear to auscultation bilaterally. No wheezes. No rhonchi. No rales. Abdomen:   Soft. Obese. Non-tender. Non-distended. Bowel sounds positive. No organomegaly or masses. No pain on palpation. Extremities:    Peripheral pulses present, 2+ BUE 1+ BLE. No peripheral edema. No cyanosis. No clubbing. Neurologic:    Alert x 3. No focal deficit. Cranial nerves grossly intact. No focal weakness. Psych:   Behavior is normal. Mood appears normal. Speech is not rapid and/or pressured. Musculoskeletal:   Spine ROM normal. Muscular strength intact. Gait not assessed. Integumentary:  The right foot and the right lower extremity below the knee are erythematous, right lower extremity is circumferential erythema with warmth and tenderness. The right foot /lower extremity is wrapped. No drainage noted. Tattoos.    Genitalia/Breast:  Deferred    Medication:  Scheduled Meds:   insulin glargine  30 Units Subcutaneous BID    Arformoterol Tartrate  15 mcg Nebulization BID    And    budesonide  0.5 mg Nebulization BID    vitamin C  1,000 mg Oral Daily    escitalopram  20 mg Oral Daily    vitamin B-12  1,000 mcg Oral Daily    zinc sulfate  50 mg Oral Daily    traZODone  50 mg Oral Nightly    insulin lispro  0-6 Units Subcutaneous TID WC    insulin lispro  0-3 Units Subcutaneous Nightly    vancomycin  1,750 mg Intravenous Q24H    cefepime  2,000 mg Intravenous Q12H    [Held by provider] aspirin  81 mg Oral Daily    atorvastatin  40 mg Oral Nightly    [Held by provider] clopidogrel  75 mg Oral Daily    sacubitril-valsartan  0.5 tablet Oral BID    pantoprazole  40 mg Oral QAM AC    metoprolol succinate  25 mg Oral Daily    gabapentin  300 mg Oral BID    furosemide  20 mg Oral Daily    ferrous sulfate  325 mg Oral Daily with breakfast    sodium chloride flush  10 mL Intravenous 2 times per day    enoxaparin  40 mg Subcutaneous Daily     Continuous Infusions:   sodium chloride Stopped (01/21/21 2340)    dextrose         Objective Data:  CBC with Differential:    Lab Results   Component Value Date    WBC 8.9 01/22/2021    RBC 4.63 01/22/2021    HGB 12.8 01/22/2021    HCT 40.2 01/22/2021     01/22/2021    MCV 86.8 01/22/2021    MCH 27.6 01/22/2021    MCHC 31.8 01/22/2021    RDW 13.3 01/22/2021    SEGSPCT 61 03/15/2014    BANDSPCT 5 06/06/2016    METASPCT 1 06/11/2016    LYMPHOPCT 26.1 01/22/2021    MONOPCT 7.7 01/22/2021    BASOPCT 0.9 01/22/2021    MONOSABS 0.68 01/22/2021    LYMPHSABS 2.32 01/22/2021    EOSABS 0.42 01/22/2021    BASOSABS 0.08 01/22/2021     BMP:    Lab Results   Component Value Date     01/22/2021    K 4.3 01/22/2021    CL 99 01/22/2021    CO2 25 01/22/2021    BUN 28 01/22/2021    LABALBU 3.5 01/22/2021    LABALBU 4.2 06/17/2011    CREATININE 1.2 01/22/2021    CALCIUM 9.4 01/22/2021    GFRAA 56 01/22/2021    LABGLOM 46 01/22/2021    GLUCOSE 223 01/22/2021    GLUCOSE 179 06/17/2011     FLP:    Lab Results   Component Value Date    TRIG 237 01/20/2021    HDL 27 01/20/2021    LDLCALC 53 01/20/2021    LABVLDL 47 01/20/2021       Wound Documentation:   Wound 04/27/15 Other (Comment) Foot Right #1 rt bottom foot aqc 2/27/15 Pressure stage 2: now diabetic non healing wound [place] 2 july 21 , 2015 (Active)   Number of days: 2094       Incision 06/05/16 Abdomen (Active)   Number of days: 1092       Wound 01/19/21 Toe (Comment  which one) Anterior;Right 2nd toe posterior (Active)   Wound Length (cm) 1.5 cm 01/19/21 2345   Wound Width (cm) 1.5 cm 01/19/21 2345   Wound Surface Area (cm^2) 2.25 cm^2 01/19/21 2345   Wound Assessment Pink/red; Other (Comment) 01/19/21 2345   Drainage Amount Scant 01/19/21 2345   Drainage Description Thin;Serous 01/19/21 2345   Odor None 01/19/21 2345   Yvonne-wound Assessment Other (Comment) 01/19/21 2345   Number of days: 0       Wound 01/19/21 Toe (Comment  which one) Right 2nd toe anterior (Active)   Wound Length (cm) 1 cm 01/19/21 2345   Wound Width (cm) 1 cm 01/19/21 2345   Wound Surface Area (cm^2) 1 cm^2 01/19/21 2345   Wound Assessment Pink/red; Other (Comment) 01/19/21 2345   Drainage Amount Scant 01/19/21 2345   Drainage Description Serous 01/19/21 2345   Odor None 01/19/21 2345   Yvonne-wound Assessment Other (Comment) 01/19/21 2345   Number of days: 0       Assessment:  1. Acute osteomyelitis involving the distal phalanx of the second digit with associated cellulitis in the setting of diabetes  2. Severe peripheral vascular disease involving the right lower extremity  3. Chronic kidney disease stage III  4. Insulin dependent diabetes mellitus type II, HbA1c 8.4%  5. Coronary artery disease status post stent and CABG  6. Systolic congestive heart failure EF of 30 to 35% with stage I diastolic dysfunction  7. Mitral and tricuspid regurgitation  8. Severe apnea on CPAP  9.  Nonoxygen dependent COPD with chronic respiratory failure  10. History of CVA with right-sided deficits and neuropathy  11. Hyperlipidemia  12. Depression  13. Current tobacco abuse    Plan:   Liudmila Barton is for vascular procedure today to be performed by Dr. Jose Martin Jacobo. Podiatry is following and he will be for surgical intervention likely tomorrow-likely amputation of the second digit. Antibiotic therapy as per ID. Activity as per podiatry. Monitor blood glucose levels and treat accordingly. Discussed need for glycemic control for wound healing. Monitor pain and treat accordingly. Continue current therapy. See orders for further plan of care. More than 50% of my time was spent at the bedside counseling/coordinating care with the patient and/or family with face to face contact. This time was spent reviewing notes and laboratory data as well as instructing and counseling the patient. Time I spent with the family or surrogate(s) is included only if the patient was incapable of providing the necessary information or participating in medical decisions. I also discussed the differential diagnosis and all of the proposed management plans with the patient and individuals accompanying the patient. I am readily available for any further decision-making and intervention. The patient was seen, examined and then discussed with Dr. Lora Arnold. Genaro Lambert, DAVID - CNP  1/22/2021  2:37 PM       I saw and evaluated the patient. I agree with the findings and the plan of care as documented in Genaro Lambert NP-C's  note.     Kathe Bruner D.O., FACOI  2:53 PM  1/22/2021

## 2021-01-22 NOTE — OP NOTE
[address] [place]                                OPERATIVE REPORT    PATIENT NAME: Deepthi Olvera                       :        1965  MED REC NO:   [id number]                            ROOM:       7534  ACCOUNT NO:   [de-identified]                           ADMIT DATE: 2021  PROVIDER:     Montana Rodriguez MD    DATE OF PROCEDURE:  2021    PREOPERATIVE DIAGNOSES:  Osteomyelitis right second toe, peripheral  arterial disease. POSTOPERATIVE DIAGNOSIS:  Right SFA severe stenosis. OPERATIVE PROCEDURE:  Left common femoral artery to right SFA  catheterization, right femoral angiogram, bilateral iliac angiogram,  nitroglycerin infusion right common femoral artery, percutaneous  transluminal angioplasty, right superficial femoral artery with 6 x 100  Saber balloon at 8 atmospheres for 1 minute. Completion right femoral  angiography, ProGlide closure left common femoral artery access site. SURGEON:  Montana Rodriguez MD    ANESTHESIA:  1% lidocaine with IV sedation. EBL:  Less than 10 mL. COMPLICATIONS:  Nil. INDICATIONS:  The patient is a 58-year-old admitted with osteomyelitis  of the right second toe, has PAD based on arterial studies. This  procedure was indicated. Risks and benefits were explained. The  patient and family understand and agreed to proceed. OPERATIVE NOTE:  The patient was brought to the angio suite at [facility].  After the patient was placed under conscious sedation  protocol, the patient was prepped and draped in usual sterile fashion. We attempted accessing the right common femoral artery in an antegrade  fashion. We were unsuccessful because of her big abdomen. We accessed  the left common femoral artery in a retrograde fashion using  micropuncture kit.   A 4-Croatian sheath was placed and subsized over 0.035  wire to a 5-Croatian crossover sheath that was crossed up and over using a  RIM catheter over 0.035 stiff angled Glidewire. We performed the right  femoral angiogram.  The findings as mentioned before. The patient is  heparinized and 1000 mcg of nitroglycerin was given in the right common  femoral artery and 0.014 Command wire was passed through the stenosis in  the right proximal SFA which was balloon angioplastied with a 6 x 100  Saber balloon at 8 atmospheres for 1 minute. Completion right femoral  angiogram shows complete resolution of the stenosis. No significant  remaining stenosis. There is brisk flow down the SFA. There has been  normal runoffs in the right leg below the knee. We pulled the sheath  back to the iliac vessels and did a bilateral iliac angiogram that shows  no stenosis in the iliac vessels. We closed the access site in the left  common femoral artery with ProGlide with good hemostasis. The patient  tolerated the procedure well, was hemodynamically stable and left the  angio suite in stable condition. Pedal pulses are present postprocedure  in both feet.         Rosa Hoff MD    D: 01/22/2021 9:54:30       T: 01/22/2021 10:03:58     [email or web address]  Job#: [id number]     Doc#: [id number]    CC:

## 2021-01-23 ENCOUNTER — ANESTHESIA (OUTPATIENT)
Dept: OPERATING ROOM | Age: 56
DRG: 253 | End: 2021-01-23
Payer: MEDICARE

## 2021-01-23 ENCOUNTER — ANESTHESIA EVENT (OUTPATIENT)
Dept: OPERATING ROOM | Age: 56
DRG: 253 | End: 2021-01-23
Payer: MEDICARE

## 2021-01-23 VITALS
RESPIRATION RATE: 11 BRPM | SYSTOLIC BLOOD PRESSURE: 109 MMHG | DIASTOLIC BLOOD PRESSURE: 55 MMHG | OXYGEN SATURATION: 100 %

## 2021-01-23 LAB
ALBUMIN SERPL-MCNC: 3.6 G/DL (ref 3.5–5.2)
ALP BLD-CCNC: 99 U/L (ref 35–104)
ALT SERPL-CCNC: 12 U/L (ref 0–32)
ANION GAP SERPL CALCULATED.3IONS-SCNC: 10 MMOL/L (ref 7–16)
AST SERPL-CCNC: 11 U/L (ref 0–31)
BASOPHILS ABSOLUTE: 0.05 E9/L (ref 0–0.2)
BASOPHILS RELATIVE PERCENT: 0.7 % (ref 0–2)
BILIRUB SERPL-MCNC: 0.2 MG/DL (ref 0–1.2)
BUN BLDV-MCNC: 31 MG/DL (ref 6–20)
CALCIUM SERPL-MCNC: 9.3 MG/DL (ref 8.6–10.2)
CHLORIDE BLD-SCNC: 98 MMOL/L (ref 98–107)
CO2: 23 MMOL/L (ref 22–29)
CREAT SERPL-MCNC: 1.4 MG/DL (ref 0.5–1)
EOSINOPHILS ABSOLUTE: 0.25 E9/L (ref 0.05–0.5)
EOSINOPHILS RELATIVE PERCENT: 3.3 % (ref 0–6)
GFR AFRICAN AMERICAN: 47
GFR NON-AFRICAN AMERICAN: 39 ML/MIN/1.73
GLUCOSE BLD-MCNC: 227 MG/DL (ref 74–99)
HCT VFR BLD CALC: 41.7 % (ref 34–48)
HEMOGLOBIN: 13.2 G/DL (ref 11.5–15.5)
IMMATURE GRANULOCYTES #: 0.03 E9/L
IMMATURE GRANULOCYTES %: 0.4 % (ref 0–5)
LYMPHOCYTES ABSOLUTE: 1.59 E9/L (ref 1.5–4)
LYMPHOCYTES RELATIVE PERCENT: 20.8 % (ref 20–42)
MCH RBC QN AUTO: 27.4 PG (ref 26–35)
MCHC RBC AUTO-ENTMCNC: 31.7 % (ref 32–34.5)
MCV RBC AUTO: 86.5 FL (ref 80–99.9)
METER GLUCOSE: 213 MG/DL (ref 74–99)
METER GLUCOSE: 233 MG/DL (ref 74–99)
METER GLUCOSE: 256 MG/DL (ref 74–99)
METER GLUCOSE: 282 MG/DL (ref 74–99)
MONOCYTES ABSOLUTE: 0.72 E9/L (ref 0.1–0.95)
MONOCYTES RELATIVE PERCENT: 9.4 % (ref 2–12)
NEUTROPHILS ABSOLUTE: 5.01 E9/L (ref 1.8–7.3)
NEUTROPHILS RELATIVE PERCENT: 65.4 % (ref 43–80)
PDW BLD-RTO: 13.2 FL (ref 11.5–15)
PLATELET # BLD: 377 E9/L (ref 130–450)
PMV BLD AUTO: 9.5 FL (ref 7–12)
POTASSIUM SERPL-SCNC: 4.5 MMOL/L (ref 3.5–5)
RBC # BLD: 4.82 E12/L (ref 3.5–5.5)
SODIUM BLD-SCNC: 131 MMOL/L (ref 132–146)
TOTAL PROTEIN: 7.3 G/DL (ref 6.4–8.3)
VANCOMYCIN TROUGH: 24.7 MCG/ML (ref 5–16)
WBC # BLD: 7.7 E9/L (ref 4.5–11.5)

## 2021-01-23 PROCEDURE — 6370000000 HC RX 637 (ALT 250 FOR IP): Performed by: THORACIC SURGERY (CARDIOTHORACIC VASCULAR SURGERY)

## 2021-01-23 PROCEDURE — 2580000003 HC RX 258: Performed by: NURSE ANESTHETIST, CERTIFIED REGISTERED

## 2021-01-23 PROCEDURE — 94640 AIRWAY INHALATION TREATMENT: CPT

## 2021-01-23 PROCEDURE — 1200000000 HC SEMI PRIVATE

## 2021-01-23 PROCEDURE — 6370000000 HC RX 637 (ALT 250 FOR IP): Performed by: INTERNAL MEDICINE

## 2021-01-23 PROCEDURE — 88305 TISSUE EXAM BY PATHOLOGIST: CPT

## 2021-01-23 PROCEDURE — 3600000002 HC SURGERY LEVEL 2 BASE: Performed by: PODIATRIST

## 2021-01-23 PROCEDURE — 2500000003 HC RX 250 WO HCPCS: Performed by: NURSE ANESTHETIST, CERTIFIED REGISTERED

## 2021-01-23 PROCEDURE — 36415 COLL VENOUS BLD VENIPUNCTURE: CPT

## 2021-01-23 PROCEDURE — 0Y6R0Z0 DETACHMENT AT RIGHT 2ND TOE, COMPLETE, OPEN APPROACH: ICD-10-PCS | Performed by: PODIATRIST

## 2021-01-23 PROCEDURE — 6360000002 HC RX W HCPCS: Performed by: NURSE ANESTHETIST, CERTIFIED REGISTERED

## 2021-01-23 PROCEDURE — 6370000000 HC RX 637 (ALT 250 FOR IP): Performed by: PODIATRIST

## 2021-01-23 PROCEDURE — 88311 DECALCIFY TISSUE: CPT

## 2021-01-23 PROCEDURE — 6360000002 HC RX W HCPCS: Performed by: THORACIC SURGERY (CARDIOTHORACIC VASCULAR SURGERY)

## 2021-01-23 PROCEDURE — 7100000000 HC PACU RECOVERY - FIRST 15 MIN: Performed by: PODIATRIST

## 2021-01-23 PROCEDURE — 3700000001 HC ADD 15 MINUTES (ANESTHESIA): Performed by: PODIATRIST

## 2021-01-23 PROCEDURE — 6360000002 HC RX W HCPCS: Performed by: CLINICAL NURSE SPECIALIST

## 2021-01-23 PROCEDURE — 2500000003 HC RX 250 WO HCPCS: Performed by: PODIATRIST

## 2021-01-23 PROCEDURE — 82962 GLUCOSE BLOOD TEST: CPT

## 2021-01-23 PROCEDURE — 2580000003 HC RX 258: Performed by: THORACIC SURGERY (CARDIOTHORACIC VASCULAR SURGERY)

## 2021-01-23 PROCEDURE — 3700000000 HC ANESTHESIA ATTENDED CARE: Performed by: PODIATRIST

## 2021-01-23 PROCEDURE — 2709999900 HC NON-CHARGEABLE SUPPLY: Performed by: PODIATRIST

## 2021-01-23 PROCEDURE — 3600000012 HC SURGERY LEVEL 2 ADDTL 15MIN: Performed by: PODIATRIST

## 2021-01-23 PROCEDURE — 6360000002 HC RX W HCPCS: Performed by: INTERNAL MEDICINE

## 2021-01-23 PROCEDURE — 80202 ASSAY OF VANCOMYCIN: CPT

## 2021-01-23 PROCEDURE — 7100000001 HC PACU RECOVERY - ADDTL 15 MIN: Performed by: PODIATRIST

## 2021-01-23 PROCEDURE — 2580000003 HC RX 258: Performed by: CLINICAL NURSE SPECIALIST

## 2021-01-23 PROCEDURE — 85025 COMPLETE CBC W/AUTO DIFF WBC: CPT

## 2021-01-23 PROCEDURE — 80053 COMPREHEN METABOLIC PANEL: CPT

## 2021-01-23 RX ORDER — MIDAZOLAM HYDROCHLORIDE 1 MG/ML
INJECTION INTRAMUSCULAR; INTRAVENOUS PRN
Status: DISCONTINUED | OUTPATIENT
Start: 2021-01-23 | End: 2021-01-23 | Stop reason: SDUPTHER

## 2021-01-23 RX ORDER — CLOPIDOGREL BISULFATE 75 MG/1
75 TABLET ORAL DAILY
Status: DISCONTINUED | OUTPATIENT
Start: 2021-01-24 | End: 2021-01-25 | Stop reason: HOSPADM

## 2021-01-23 RX ORDER — ASPIRIN 81 MG/1
81 TABLET ORAL DAILY
Status: DISCONTINUED | OUTPATIENT
Start: 2021-01-24 | End: 2021-01-25 | Stop reason: HOSPADM

## 2021-01-23 RX ORDER — FENTANYL CITRATE 50 UG/ML
INJECTION, SOLUTION INTRAMUSCULAR; INTRAVENOUS PRN
Status: DISCONTINUED | OUTPATIENT
Start: 2021-01-23 | End: 2021-01-23 | Stop reason: SDUPTHER

## 2021-01-23 RX ORDER — BUPIVACAINE HYDROCHLORIDE 5 MG/ML
INJECTION, SOLUTION EPIDURAL; INTRACAUDAL PRN
Status: DISCONTINUED | OUTPATIENT
Start: 2021-01-23 | End: 2021-01-23 | Stop reason: ALTCHOICE

## 2021-01-23 RX ORDER — MEPERIDINE HYDROCHLORIDE 25 MG/ML
12.5 INJECTION INTRAMUSCULAR; INTRAVENOUS; SUBCUTANEOUS EVERY 5 MIN PRN
Status: DISCONTINUED | OUTPATIENT
Start: 2021-01-23 | End: 2021-01-23 | Stop reason: HOSPADM

## 2021-01-23 RX ORDER — SODIUM CHLORIDE 9 MG/ML
INJECTION, SOLUTION INTRAVENOUS CONTINUOUS PRN
Status: DISCONTINUED | OUTPATIENT
Start: 2021-01-23 | End: 2021-01-23 | Stop reason: SDUPTHER

## 2021-01-23 RX ORDER — HYDROCODONE BITARTRATE AND ACETAMINOPHEN 5; 325 MG/1; MG/1
1 TABLET ORAL EVERY 6 HOURS PRN
Status: DISCONTINUED | OUTPATIENT
Start: 2021-01-23 | End: 2021-01-25 | Stop reason: HOSPADM

## 2021-01-23 RX ORDER — PROPOFOL 10 MG/ML
INJECTION, EMULSION INTRAVENOUS CONTINUOUS PRN
Status: DISCONTINUED | OUTPATIENT
Start: 2021-01-23 | End: 2021-01-23 | Stop reason: SDUPTHER

## 2021-01-23 RX ORDER — KETAMINE HYDROCHLORIDE 50 MG/ML
INJECTION, SOLUTION, CONCENTRATE INTRAMUSCULAR; INTRAVENOUS PRN
Status: DISCONTINUED | OUTPATIENT
Start: 2021-01-23 | End: 2021-01-23 | Stop reason: SDUPTHER

## 2021-01-23 RX ADMIN — INSULIN LISPRO 2 UNITS: 100 INJECTION, SOLUTION INTRAVENOUS; SUBCUTANEOUS at 12:06

## 2021-01-23 RX ADMIN — METOPROLOL SUCCINATE 25 MG: 25 TABLET, EXTENDED RELEASE ORAL at 09:56

## 2021-01-23 RX ADMIN — INSULIN LISPRO 3 UNITS: 100 INJECTION, SOLUTION INTRAVENOUS; SUBCUTANEOUS at 06:54

## 2021-01-23 RX ADMIN — BUDESONIDE 500 MCG: 0.5 INHALANT RESPIRATORY (INHALATION) at 16:24

## 2021-01-23 RX ADMIN — CYANOCOBALAMIN TAB 1000 MCG 1000 MCG: 1000 TAB at 09:56

## 2021-01-23 RX ADMIN — KETAMINE HYDROCHLORIDE 220 MG: 50 INJECTION INTRAMUSCULAR; INTRAVENOUS at 08:06

## 2021-01-23 RX ADMIN — HYDROCODONE BITARTRATE AND ACETAMINOPHEN 1 TABLET: 5; 325 TABLET ORAL at 19:00

## 2021-01-23 RX ADMIN — CEFEPIME 2000 MG: 2 INJECTION, POWDER, FOR SOLUTION INTRAMUSCULAR; INTRAVENOUS at 05:27

## 2021-01-23 RX ADMIN — SACUBITRIL AND VALSARTAN 0.5 TABLET: 24; 26 TABLET, FILM COATED ORAL at 09:59

## 2021-01-23 RX ADMIN — MIDAZOLAM 2 MG: 1 INJECTION INTRAMUSCULAR; INTRAVENOUS at 08:06

## 2021-01-23 RX ADMIN — BUDESONIDE 500 MCG: 0.5 INHALANT RESPIRATORY (INHALATION) at 06:13

## 2021-01-23 RX ADMIN — FUROSEMIDE 20 MG: 20 TABLET ORAL at 09:56

## 2021-01-23 RX ADMIN — PANTOPRAZOLE SODIUM 40 MG: 40 TABLET, DELAYED RELEASE ORAL at 09:56

## 2021-01-23 RX ADMIN — SODIUM CHLORIDE: 9 INJECTION, SOLUTION INTRAVENOUS at 10:43

## 2021-01-23 RX ADMIN — CEFEPIME 2000 MG: 2 INJECTION, POWDER, FOR SOLUTION INTRAMUSCULAR; INTRAVENOUS at 17:02

## 2021-01-23 RX ADMIN — Medication 50 MG: at 09:56

## 2021-01-23 RX ADMIN — TRAZODONE HYDROCHLORIDE 50 MG: 50 TABLET ORAL at 20:36

## 2021-01-23 RX ADMIN — GABAPENTIN 300 MG: 300 CAPSULE ORAL at 20:36

## 2021-01-23 RX ADMIN — ENOXAPARIN SODIUM 40 MG: 40 INJECTION SUBCUTANEOUS at 09:55

## 2021-01-23 RX ADMIN — HYDROCODONE BITARTRATE AND ACETAMINOPHEN 1 TABLET: 5; 325 TABLET ORAL at 13:32

## 2021-01-23 RX ADMIN — DESMOPRESSIN ACETATE 40 MG: 0.2 TABLET ORAL at 20:37

## 2021-01-23 RX ADMIN — INSULIN LISPRO 3 UNITS: 100 INJECTION, SOLUTION INTRAVENOUS; SUBCUTANEOUS at 16:49

## 2021-01-23 RX ADMIN — GABAPENTIN 300 MG: 300 CAPSULE ORAL at 09:55

## 2021-01-23 RX ADMIN — INSULIN GLARGINE 35 UNITS: 100 INJECTION, SOLUTION SUBCUTANEOUS at 09:59

## 2021-01-23 RX ADMIN — INSULIN GLARGINE 35 UNITS: 100 INJECTION, SOLUTION SUBCUTANEOUS at 20:37

## 2021-01-23 RX ADMIN — ESCITALOPRAM OXALATE 20 MG: 10 TABLET ORAL at 09:55

## 2021-01-23 RX ADMIN — OXYCODONE HYDROCHLORIDE AND ACETAMINOPHEN 1000 MG: 500 TABLET ORAL at 09:55

## 2021-01-23 RX ADMIN — PROPOFOL INJECTABLE EMULSION 100 MCG/KG/MIN: 10 INJECTION, EMULSION INTRAVENOUS at 08:11

## 2021-01-23 RX ADMIN — ARFORMOTEROL TARTRATE 15 MCG: 15 SOLUTION RESPIRATORY (INHALATION) at 06:13

## 2021-01-23 RX ADMIN — PHENYLEPHRINE HYDROCHLORIDE 200 MCG: 10 INJECTION INTRAVENOUS at 08:34

## 2021-01-23 RX ADMIN — ARFORMOTEROL TARTRATE 15 MCG: 15 SOLUTION RESPIRATORY (INHALATION) at 16:24

## 2021-01-23 RX ADMIN — SACUBITRIL AND VALSARTAN 0.5 TABLET: 24; 26 TABLET, FILM COATED ORAL at 20:37

## 2021-01-23 RX ADMIN — SODIUM CHLORIDE: 9 INJECTION, SOLUTION INTRAVENOUS at 08:06

## 2021-01-23 RX ADMIN — SODIUM CHLORIDE: 9 INJECTION, SOLUTION INTRAVENOUS at 21:43

## 2021-01-23 RX ADMIN — FERROUS SULFATE TAB 325 MG (65 MG ELEMENTAL FE) 325 MG: 325 (65 FE) TAB at 09:56

## 2021-01-23 RX ADMIN — FENTANYL CITRATE 50 MCG: 50 INJECTION, SOLUTION INTRAMUSCULAR; INTRAVENOUS at 08:06

## 2021-01-23 RX ADMIN — INSULIN LISPRO 1 UNITS: 100 INJECTION, SOLUTION INTRAVENOUS; SUBCUTANEOUS at 20:38

## 2021-01-23 ASSESSMENT — PULMONARY FUNCTION TESTS
PIF_VALUE: 1
PIF_VALUE: 0
PIF_VALUE: 1

## 2021-01-23 ASSESSMENT — PAIN SCALES - GENERAL
PAINLEVEL_OUTOF10: 3
PAINLEVEL_OUTOF10: 7

## 2021-01-23 ASSESSMENT — LIFESTYLE VARIABLES: SMOKING_STATUS: 1

## 2021-01-23 NOTE — OP NOTE
[address]                                OPERATIVE REPORT    PATIENT NAME: Dasha Miguel                       :        1965  MED REC NO:   [id number]                            ROOM:       7493  ACCOUNT NO:   [de-identified]                           ADMIT DATE: 2021  PROVIDER:     Grupo Zuluaga DPM    DATE OF PROCEDURE:  2021    SURGEON:  Grupo Zuluaga DPM    ASSISTANT:  Melinda Parisi, PGY-1    PREOPERATIVE DIAGNOSES:  1. Acute osteomyelitis, right second toe. 2.  Peripheral arterial disease. 3.  Open wound, right second toe. POSTOPERATIVE DIAGNOSES:  1. Acute osteomyelitis, right second toe. 2.  Peripheral arterial disease status post successful endovascular  intervention, specifically angioplasty of the right superficial femoral  artery as well as the common femoral artery. 3.  Open wound, right second toe. PROCEDURES PERFORMED:  Amputation of right second toe as well as other  metatarsophalangeal joint. ANESTHESIA:  MAC with local anesthesia infiltration comprising of 12 mL  of 0.5% Marcaine plain. HEMOSTASIS:  On the field. ESTIMATED BLOOD LOSS:  Minimal.    COMPLICATIONS:  None. SPECIMEN:  Right second toe. MATERIALS USED:  3-0 nylon suture. INTRAOPERATIVE FINDINGS:  Necrosis of soft tissue and bone consistent  with the patient's clinical picture here. INDICATIONS:  This is a pleasant 58-year-old female who was admitted  with a right second toe infection. The patient had severe peripheral  arterial disease and thus had a workup for vascular status as well as  Vascular Surgery consultation. She underwent successful right femoral  artery as well as superficial femoral artery angioplasty by Dr. Finesse Carpenter yesterday. The patient presents today for right second toe  amputation status post successful endovascular intervention.   I  counseled the patient on the nature of the of necrosis. Post closure, I then applied  postoperative bandage. The patient tolerated the procedure and anesthesia well in apparent  satisfactory condition with vital signs stable and vascular status  intact to the right lower extremity. The patient was then transferred  to PACU for further monitoring prior to readmission to the nursing  floor.         Anahy Contreras DPM    D: 01/23/2021 8:44:35       T: 01/23/2021 9:38:58     [email or web address]  Job#: [id number]     Doc#: [id number]    CC:

## 2021-01-23 NOTE — PROGRESS NOTES
Pharmacy Consultation Note  (Antibiotic Dosing and Monitoring)    Initial consult date: 1/20/2021  Consulting physician: Dr. Phuong Menchaca  Drug(s): Vancomycin  Indication: Diabetic foot infection    Ht Readings from Last 1 Encounters:   01/19/21 5' 9\" (1.753 m)     Wt Readings from Last 1 Encounters:   01/19/21 245 lb (111.1 kg)     Age/  Gender IBW DW  Allergy Information   63 y.o.   female 66.2 kg 111.1 kg  Ace inhibitors and Percocet [oxycodone-acetaminophen]          Date  Tmax WBC BUN/CR UOP  (mL/kg/hr) Drug/Dose Time   Given Level(s)   (Time) Comments   1/19  (#1) afebrile 14.1 32/1.5 -- Vancomycin 2000 mg IV x 1 2225 1/20  (#2) afebrile 8.8 31/1.4 -- Vancomycin 1750 mg IV q24hr 2222     1/21  (#3) afebrile 6.6 27/1.3 -- Vancomycin 1750 mg IV q24hr 2300     1/22  (#4) afebrile 8.9 28/1.2 -- Vancomycin 1750 mg IV q24hr 2130  Dose hung an hour early and prior to the scheduled trough   1/23  (#5) afebrile 7.7 31/1.4 -- Vancomycin 1750 mg IV q24hr <2230> Trough @ 2200      (#6)             (#7)             Estimated Creatinine Clearance: 60 mL/min (A) (based on SCr of 1.4 mg/dL (H)). UOP over the past 24 hours:       Intake/Output Summary (Last 24 hours) at 1/23/2021 1124  Last data filed at 1/23/2021 0914  Gross per 24 hour   Intake 1020 ml   Output 300 ml   Net 720 ml     Other anti-infectives: Anti-infective Dose Date Initiated Date Stopped   Pip/tazo 3.375g IV q8hr 1/20 1/20   Cefepime 2g IV q12hr 1/20      Cultures:  available culture and sensitivity results were reviewed in EPIC  Cultures sent and are pending. Culture Date Result    Blood cx 1 1/19 NGTD   Blood cx 2 1/19 NGTD   Wound cx  (R foot) 1/20 MRSA   MRSA nares 1/20 Not isolated     Assessment:  · Consulted by Dr. Phuong Menchaca to dose/monitor vancomycin  · Goal trough level:  15-20 mcg/mL  · Pt is a 63 yoF who presented from home with R diabetic foot infection and cellulitis. MRI ordered to rule out osteomyelitis.    · Serum creatinine today: 1.4 mg/dL; CrCl ~ 60 mL/min; baseline Scr unknown (was 0.8 - 1 in 2016).     Plan:  · Vancomycin 1750 mg IV q24hr  · Trough tonight @ 2200; Please hold the following dose if level is >20 mcg/mL  · Nursing communication order to ensure trough drawn prior to next dose  · Follow renal function  · Pharmacist will follow and monitor/adjust dosing as necessary      Thank you for the consult,    Jimbo Mcgee, PharmD 1/23/2021 11:24 AM   [phone]

## 2021-01-23 NOTE — ANESTHESIA POSTPROCEDURE EVALUATION
Department of Anesthesiology  Postprocedure Note    Patient: Victor Hugo Babin  MRN: [id number]  YOB: 1965  Date of evaluation: 1/23/2021  Time:  9:31 AM     Procedure Summary     Date: 01/23/21 Room / Location: [address] [place] [address] 03 / [address] [facility]    Anesthesia Start: 8849 Anesthesia Stop: 0495    Procedure: RIGHT SECOND DIGIT AMPUTATION (Right Toes) Diagnosis: (OSTEOMYELITIS)    Surgeons: Rogelio Peterson DPM Responsible Provider: Alfonzo Pandey MD    Anesthesia Type: general ASA Status: 3          Anesthesia Type: general    Lizzie Phase I: Lizzie Score: 10    Lizzie Phase II:      Last vitals: Reviewed and per EMR flowsheets.        Anesthesia Post Evaluation    Patient location during evaluation: PACU  Patient participation: complete - patient participated  Level of consciousness: awake  Pain score: 0  Airway patency: patent  Nausea & Vomiting: no nausea  Complications: no  Cardiovascular status: blood pressure returned to baseline  Respiratory status: acceptable  Hydration status: euvolemic

## 2021-01-23 NOTE — PROGRESS NOTES
Internal Medicine Progress Note    [facility]    Kevin Espinal. CIARA Herrera Monday, DANNY.O., CIARA Maria D.O. [facility], MSN, APRN, NP-C  Sujey Louise. John Chan, MSN, APRN-CNP     Primary Care Physician: Joce Henning MD   Admitting Physician:  Shahnaz Amaro DO  Admission date and time: 1/19/2021  8:10 PM    Room:  [address] [place] [address]  Admitting diagnosis: Cellulitis of foot [L03.119]    Patient Name: Milan Burger  MRN: [id number]    Date of Service: 1/23/2021     Subjective:  Ben Rodriguez is a 63 y.o. female who was seen and examined today,1/23/2021, at the bedside. The patient underwent angioplasty yesterday by Dr. Rose Pérez and doing better today. She underwent amputation of the second digit of the right foot by Dr. Mady Palafox for osteomyelitis. She was seen postoperatively and is resting comfortably. Her biggest concern was today was to discuss the need for cigarette cessation. She is proud that she has cut down from 3 packs a day to 1 pack. We have complemented her but did suggest the need for total abstinence    No family present    Review of System:   Constitutional:   Denies fever or chills, weight loss or gain, admits to fatigue  HEENT:   Denies ear pain, sore throat, sinus or eye problems. Cardiovascular:   Denies any chest pain, or palpitations. Admits to extensive prior cardiac history with no recent intervention. Respiratory:   Denies shortness of breath, coughing, sputum production, hemoptysis, or wheezing. Gastrointestinal:   Denies nausea, vomiting, diarrhea, or constipation. Denies any abdominal pain. Genitourinary:    Denies any urgency, frequency, hematuria. Voiding  without difficulty. Extremities:   Denies lower extremity swelling, edema or cyanosis. Neurology:    Denies any headache or focal neurological deficits, Denies generalized weakness or memory difficulty. Admits to numbness and tingling of the right lower extremity. Psch:   Denies being anxious or depressed. Musculoskeletal:    Denies  myalgias, joint complaints or back pain. Integumentary:   Admits to  Right foot wound. Tattoos Denies bruising. Hematologic/Lymphatic:  Denies bruising or bleeding. Physical Exam:  I/O this shift:  In: 600 [I.V.:600]  Out: 2 [Urine:2]    Intake/Output Summary (Last 24 hours) at 1/23/2021 1403  Last data filed at 1/23/2021 1222  Gross per 24 hour   Intake 840 ml   Output 2 ml   Net 838 ml   I/O last 3 completed shifts: In: 420 [P.O.:420]  Out: 300 [Urine:300]  Patient Vitals for the past 96 hrs (Last 3 readings):   Weight   01/19/21 2358 245 lb (111.1 kg)   01/19/21 1933 245 lb (111.1 kg)     Vital Signs:   Blood pressure (!) 148/71, pulse 80, temperature 97.8 °F (36.6 °C), temperature source Oral, resp. rate 18, height 5' 9\" (1.753 m), weight 245 lb (111.1 kg), SpO2 97 %. General appearance:  Alert, responsive, oriented to person, place, and time. Comfortable appearing, no distress. Head:  Normocephalic. No masses, lesions or tenderness. Eyes:  PERRLA. EOMI. Sclera clear. Buccal mucosa moist.  ENT:  Ears normal. Mucosa normal.  Neck:    Supple. Trachea midline. No thyromegaly. No JVD. No bruits. Heart:    Rhythm regular. Rate controlled. Systolic murmur. Lungs:    Midline prior sternotomy incision scar noted. Symmetrical.  Diminished bibasilar. Lungs clear to auscultation bilaterally. No wheezes. No rhonchi. No rales. Abdomen:   Soft. Obese. Non-tender. Non-distended. Bowel sounds positive. No organomegaly or masses. No pain on palpation. Extremities:    Peripheral pulses present, 2+ BUE 1+ BLE. No peripheral edema. No cyanosis. No clubbing. Neurologic:    Alert x 3. No focal deficit. Cranial nerves grossly intact. No focal weakness. Psych:   Behavior is normal. Mood appears normal. Speech is not rapid and/or pressured. Musculoskeletal:   Spine ROM normal. Muscular strength intact.  Gait not 01/23/2021    LABALBU 3.6 01/23/2021    LABALBU 4.2 06/17/2011    CREATININE 1.4 01/23/2021    CALCIUM 9.3 01/23/2021    GFRAA 47 01/23/2021    LABGLOM 39 01/23/2021    GLUCOSE 227 01/23/2021    GLUCOSE 179 06/17/2011     FLP:    Lab Results   Component Value Date    TRIG 237 01/20/2021    HDL 27 01/20/2021    LDLCALC 53 01/20/2021    LABVLDL 47 01/20/2021       Wound Documentation:   Wound 04/27/15 Other (Comment) Foot Right #1 rt bottom foot aqc 2/27/15 Pressure stage 2: now diabetic non healing wound [place] 2 july 21 , 2015 (Active)   Number of days: 2094       Incision 06/05/16 Abdomen (Active)   Number of days: 2282       Wound 01/19/21 Toe (Comment  which one) Anterior;Right 2nd toe posterior (Active)   Wound Length (cm) 1.5 cm 01/19/21 2345   Wound Width (cm) 1.5 cm 01/19/21 2345   Wound Surface Area (cm^2) 2.25 cm^2 01/19/21 2345   Wound Assessment Pink/red; Other (Comment) 01/19/21 2345   Drainage Amount Scant 01/19/21 2345   Drainage Description Thin;Serous 01/19/21 2345   Odor None 01/19/21 2345   Yvonne-wound Assessment Other (Comment) 01/19/21 2345   Number of days: 0       Wound 01/19/21 Toe (Comment  which one) Right 2nd toe anterior (Active)   Wound Length (cm) 1 cm 01/19/21 2345   Wound Width (cm) 1 cm 01/19/21 2345   Wound Surface Area (cm^2) 1 cm^2 01/19/21 2345   Wound Assessment Pink/red; Other (Comment) 01/19/21 2345   Drainage Amount Scant 01/19/21 2345   Drainage Description Serous 01/19/21 2345   Odor None 01/19/21 2345   Yvonne-wound Assessment Other (Comment) 01/19/21 2345   Number of days: 0       Assessment:      · Acute osteomyelitis involving the distal phalanx of the second digit with associated cellulitis in the setting of diabetes with status post amputation of the second toe right foot for acute osteomyelitis by Dr. Arnold Hart on January 23  · Severe peripheral vascular disease involving the right lower extremity with status post balloon angioplasty of the right SFA for severe stenosis by  Z on January 22  · Chronic kidney disease stage III  · Insulin dependent diabetes mellitus type II, HbA1c 8.4%  · Coronary artery disease status post stent and CABG  · Systolic congestive heart failure EF of 30 to 35% with stage I diastolic dysfunction  · Mitral and tricuspid regurgitation  · Severe apnea on CPAP  · Nonoxygen dependent COPD with chronic respiratory failure  · History of CVA with right-sided deficits and neuropathy  · Hyperlipidemia  · Depression  · Current tobacco abuse    Plan:     · Patient underwent surgical amputation of the second digit today of the right foot and is doing well. Continue to follow with podiatry  · Angioplasty was performed yesterday for better blood flow  · Discussed with patient the need for behavior modification such as cigarette cessation and control of lipid  · Continue monitor blood sugar with aggressive glycemic control  · Consider adding low-dose Xarelto or Eliquis due to PAD      More than 50% of my time was spent at the bedside counseling/coordinating care with the patient and/or family with face to face contact. This time was spent reviewing notes and laboratory data as well as instructing and counseling the patient. Time I spent with the family or surrogate(s) is included only if the patient was incapable of providing the necessary information or participating in medical decisions. I also discussed the differential diagnosis and all of the proposed management plans with the patient and individuals accompanying the patient. I am readily available for any further decision-making and intervention.              Stephani Dowd D.O., [facility]  2:03 PM  1/23/2021

## 2021-01-23 NOTE — PROGRESS NOTES
Spoke with Prudencio Mobley, the patient's daughter at the patient's request. She was updated about the patient.

## 2021-01-23 NOTE — PROGRESS NOTES
[facility]  [facility]  Progress Note    NAME: Greg Cruz  MR:  [id number]  :   1965  DATE OF SERVICE:21    This is a face to face encounter with Greg Cruz 63 y.o. female on 21  ID following for   Chief Complaint   Patient presents with    Wound Check     Wound check to the right foot. Patient told to come to the ER by Dr Miguel Angel Hurtado. SUBJECTIVE:  IN BED HAS NO C/O  S/P ANGIO  S/p amp 2nd toe open  Seen postop   NO F/C/N/V/D  FOOT DRESSED  Patient is tolerating medications. No reported adverse drug reactions. Review of systems:  As stated above in the chief complaint, otherwise negative.     Medications:  Scheduled Meds:   [START ON 2021] aspirin  81 mg Oral Daily    [START ON 2021] clopidogrel  75 mg Oral Daily    insulin glargine  35 Units Subcutaneous BID    Arformoterol Tartrate  15 mcg Nebulization BID    And    budesonide  0.5 mg Nebulization BID    vitamin C  1,000 mg Oral Daily    escitalopram  20 mg Oral Daily    vitamin B-12  1,000 mcg Oral Daily    zinc sulfate  50 mg Oral Daily    traZODone  50 mg Oral Nightly    insulin lispro  0-6 Units Subcutaneous TID WC    insulin lispro  0-3 Units Subcutaneous Nightly    vancomycin  1,750 mg Intravenous Q24H    cefepime  2,000 mg Intravenous Q12H    atorvastatin  40 mg Oral Nightly    sacubitril-valsartan  0.5 tablet Oral BID    pantoprazole  40 mg Oral QAM AC    metoprolol succinate  25 mg Oral Daily    gabapentin  300 mg Oral BID    furosemide  20 mg Oral Daily    ferrous sulfate  325 mg Oral Daily with breakfast    sodium chloride flush  10 mL Intravenous 2 times per day    enoxaparin  40 mg Subcutaneous Daily     Continuous Infusions:   sodium chloride 12.5 mL/hr at 21 1043    dextrose       PRN Meds:glucose, dextrose, glucagon (rDNA), dextrose, sodium chloride flush, acetaminophen **OR** acetaminophen    OBJECTIVE:  BP (!) 148/71   Pulse 80   Temp 97.8 °F (36.6 °C) (Oral)   Resp 18   Ht 5' 9\" (1.753 m)   Wt 245 lb (111.1 kg)   SpO2 97%   BMI 36.18 kg/m²   Temp  Av.7 °F (36.5 °C)  Min: 97 °F (36.1 °C)  Max: 98.1 °F (36.7 °C)  Constitutional:  The patient is awake, alert, and oriented. SUPINE POST ANGIO  Skin:    Warm and dry. HEENT:    AT/NC   Chest:   No use of accessory muscles to breathe. Symmetrical expansion. Cardiovascular:  S1 and S2 are rhythmic and regular. Abdomen:   Positive bowel sounds to auscultation. Benign to palpation. Extremities:     FOOT DRESSED postop   CNS    AAxO   Lines: pIV        Radiology:  Laboratory and Tests Review:  Lab Results   Component Value Date    WBC 7.7 2021    WBC 8.9 2021    WBC 6.6 2021    HGB 13.2 2021    HCT 41.7 2021    MCV 86.5 2021     2021     No results found for: [facility]  Lab Results   Component Value Date    ALT 12 2021    AST 11 2021    ALKPHOS 99 2021    BILITOT 0.2 2021     Lab Results   Component Value Date     2021    K 4.5 2021    CL 98 2021    CO2 23 2021    BUN 31 2021    CREATININE 1.4 2021    CREATININE 1.2 2021    CREATININE 1.3 2021    GFRAA 47 2021    LABGLOM 39 2021    GLUCOSE 227 2021    GLUCOSE 179 2011    PROT 7.3 2021    LABALBU 3.6 2021    LABALBU 4.2 2011    CALCIUM 9.3 2021    BILITOT 0.2 2021    ALKPHOS 99 2021    AST 11 2021    ALT 12 2021     Lab Results   Component Value Date    CRP 1.3 (H) 2021    CRP 2.7 (H) 2016    CRP 4.4 (H) 2016     Lab Results   Component Value Date    SEDRATE 44 (H) 2021    SEDRATE 86 (H) 2016    SEDRATE 95 (H) 2016       Microbiology:   No results for input(s): COVID19 in the last 72 hours.   Lab Results   Component Value Date    BLOODCULT2 24 Hours no growth 2021    BLOODCULT2 5 Days- no growth 2016    ORG Staphylococcus aureus 01/20/2021    ORG Staphylococcus aureus 06/24/2016    ORG Sveta glabrata 06/05/2016    ORG Candida albicans 06/05/2016    ORG Candida species 06/05/2016     WOUND/ABSCESS   Date Value Ref Range Status   01/20/2021   Final    Light growth  Methicillin resistant Staph aureus isolated. Most Methicillin  resistant Staphylococcus are usually resistant to multiple  antibiotics including other B-Lactams, Aminoglycosides,  Macrolides, Clindamycin and Tetracycline. Contact isolation  is indicated. 06/24/2016   Final    Heavy growth  Methicillin resistant Staph aureus isolated. Most Methicillin  resistant Staphylococcus are usually resistant to multiple  antibiotics including other B-Lactams, Aminoglycosides,  Macrolides, Clindamycin and Tetracycline. Contact isolation  is indicated. 07/28/2015 Moderate growth  Final   07/28/2015   Final    Moderate growth  Methicillin resistant Staph aureus isolated. Most Methicillin  resistant Staphylococcus are usually resistant to multiple  antibiotics including other B-Lactams, Aminoglycosides,  Macrolides, Clindamycin and Tetracycline. Contact isolation  is indicated. This isolate is presumed to be resistant based on the  detection of inducible Clindamycin resistance. Clindamycin  may still be effective in some patients. MRSA Culture Only   Date Value Ref Range Status   01/20/2021 Methicillin resistant Staph aureus not isolated  Final      ASSESSMENT/PLAN:  Leukocytosis  Cellulitis right foot MRSA  Dfu/infection right second toe  S/p amp seconf toe wound open 1/23  PAD  s/p Angiogram and revascularization        vancomycin (VANCOCIN) 1,750 mg in dextrose 5 % 500 mL IVPB, Q24H FOLLOWED BY PHARMACY     cefepime (MAXIPIME) 2000 mg IVPB extended (mini-bag), Q12H   await final cx MRSA    · Monitor labs    Imaging and labs were reviewed per medical records.    The patient was educated about the diagnosis, prognosis, indications, risks and benefits of treatment. An opportunity to ask questions was given to the patient/FAMILY. Thank you for involving me in the care of Vignesh Render will continue to follow. Please do not hesitate to call for any questions or concerns.     Electronically signed by Juan Alberto Tang MD on 1/23/2021 at 11:53 AM

## 2021-01-23 NOTE — BRIEF OP NOTE
Brief Postoperative Note      Patient: Rosita Guerra  YOB: 1965  MRN: [id number]    Date of Procedure: 1/23/2021    Pre-Op Diagnosis: OSTEOMYELITIS right second toe    Post-Op Diagnosis: Same       Procedure(s):  RIGHT SECOND DIGIT AMPUTATION    Surgeon(s):  Candido Angeles DPM    Assistant:  * No surgical staff found *    Anesthesia: General    Estimated Blood Loss (mL): Minimal    Complications: None    Specimens:   ID Type Source Tests Collected by Time Destination   A : RIGHT FOOT SECOND TOE Specimen Toe SURGICAL PATHOLOGY Tiffany Lopez DPM 1/23/2021 9235        Implants:  * No implants in log *      Drains:   [REMOVED] External Urinary Catheter (Removed)   Suction- Female Only 40 mmgHg continuous 01/20/21 0743   Placement Initiated 01/20/21 0743       Findings: Necrosis of soft tissue and bone at the right second digit.     Electronically signed by Tiffany Lopez DPM on 1/23/2021 at 8:38 AM

## 2021-01-23 NOTE — ANESTHESIA PRE PROCEDURE
Department of Anesthesiology  Preprocedure Note       Name:  Russ Bland   Age:  63 y.o.  :  1965                                          MRN:  [id number]         Date:  2021      Surgeon: Wilmar Shetty):  Candido Tony DPM    Procedure: Procedure(s):  RIGHT SECOND DIGIT AMPUTATION    Medications prior to admission:   Prior to Admission medications    Medication Sig Start Date End Date Taking?  Authorizing Provider   canagliflozin (INVOKANA) 300 MG TABS tablet Take 300 mg by mouth every morning (before breakfast)   Yes Historical Provider, MD   Biotin 23420 MCG TABS Take by mouth   Yes Historical Provider, MD   zinc 50 MG TABS tablet Take 50 mg by mouth daily   Yes Historical Provider, MD   vitamin B-12 (CYANOCOBALAMIN) 1000 MCG tablet Take 1,000 mcg by mouth daily   Yes Historical Provider, MD   Turmeric (QC TUMERIC COMPLEX) 500 MG CAPS Take by mouth   Yes Historical Provider, MD   Ascorbic Acid (VITAMIN C) 1000 MG tablet Take 1,000 mg by mouth daily   Yes Historical Provider, MD   Fluticasone Propionate (FLONASE ALLERGY RELIEF NA) by Nasal route   Yes Historical Provider, MD   metoprolol succinate (TOPROL XL) 25 MG extended release tablet Take 1 tablet by mouth daily 20  Yes Tolu Lynch MD   furosemide (LASIX) 20 MG tablet Take 1 tablet by mouth daily 20  Yes Tolu Lynch MD   sacubitril-valsartan (ENTRESTO) 24-26 MG per tablet Take 0.5 tablets by mouth 2 times daily Take a half of a tablet BID 20  Yes Tolu Lynch MD   Cholecalciferol (VITAMIN D) 125 MCG (5000 UT) CAPS Take 5,000 Units by mouth   Yes Historical Provider, MD   insulin glargine (BASAGLAR KWIKPEN) 100 UNIT/ML injection pen Inject 26 Units into the skin 2 times daily 26 am   30 units in pm   Yes Historical Provider, MD   acetaminophen (TYLENOL) 325 MG tablet Take 650 mg by mouth 2 times daily as needed for Pain   Yes Historical Provider, MD   pantoprazole (PROTONIX) 40 MG tablet  17  Yes Historical Provider, MD atorvastatin (LIPITOR) 40 MG tablet  6/7/17  Yes Historical Provider, MD   escitalopram (LEXAPRO) 20 MG tablet  6/7/17  Yes Historical Provider, MD   ferrous sulfate 325 (65 Fe) MG tablet  6/7/17  Yes Historical Provider, MD   traZODone (DESYREL) 50 MG tablet Take 50 mg by mouth nightly   Yes Historical Provider, MD   gabapentin (NEURONTIN) 300 MG capsule Take 300 mg by mouth 2 times daily.  .   Yes Historical Provider, MD   clopidogrel (PLAVIX) 75 MG tablet Take 75 mg by mouth daily   Yes Historical Provider, MD   aspirin 81 MG EC tablet Take 1 tablet by mouth daily 10/28/16  Yes Leonard Ruiz MD   metFORMIN (GLUCOPHAGE) 1000 MG tablet Take 1 tablet by mouth 2 times daily (with meals) 10/28/16  Yes Leonard Ruiz MD   BREO ELLIPTA 200-25 MCG/INH AEPB  6/9/17   Historical Provider, MD   albuterol sulfate  (90 BASE) MCG/ACT inhaler Inhale 2 puffs into the lungs every 6 hours as needed for Wheezing    Historical Provider, MD       Current medications:    Current Facility-Administered Medications   Medication Dose Route Frequency Provider Last Rate Last Admin    insulin glargine (LANTUS) injection vial 35 Units  35 Units Subcutaneous BID Penelope Singh, DO   35 Units at 01/22/21 2123    Arformoterol Tartrate (BROVANA) nebulizer solution 15 mcg  15 mcg Nebulization BID Altagracia Hoskins, DO   15 mcg at 01/23/21 2559    And    budesonide (PULMICORT) nebulizer suspension 500 mcg  0.5 mg Nebulization BID Altagracia Hoskins, DO   500 mcg at 01/23/21 9710    ascorbic acid (VITAMIN C) tablet 1,000 mg  1,000 mg Oral Daily Altagracia See, DO   1,000 mg at 01/22/21 1158    escitalopram (LEXAPRO) tablet 20 mg  20 mg Oral Daily Altagracia See, DO   20 mg at 01/22/21 1159    vitamin B-12 (CYANOCOBALAMIN) tablet 1,000 mcg  1,000 mcg Oral Daily Altagracia Hoskins, DO   1,000 mcg at 01/22/21 1159    zinc sulfate (ZINCATE) capsule 50 mg  50 mg Oral Daily Altagracia Hoskins, DO   50 mg at 01/22/21 1159    traZODone (DESYREL) tablet 50 mg  50 mg Oral Nightly Watts Ruse, DO   50 mg at 01/22/21 2123    insulin lispro (HUMALOG) injection vial 0-6 Units  0-6 Units Subcutaneous TID WC Watts Ruse, DO   3 Units at 01/23/21 0654    insulin lispro (HUMALOG) injection vial 0-3 Units  0-3 Units Subcutaneous Nightly Watts Ruse, DO   3 Units at 01/22/21 2123    vancomycin (VANCOCIN) 1,750 mg in dextrose 5 % 500 mL IVPB  1,750 mg Intravenous Q24H Watts Ruse, DO   Stopped at 01/23/21 0000    cefepime (MAXIPIME) 2000 mg IVPB extended (mini-bag)  2,000 mg Intravenous Q12H ShanelleDAVID Baer - CNS 12.5 mL/hr at 01/23/21 0527 2,000 mg at 01/23/21 4303    And    0.9 % sodium chloride infusion   Intravenous Q12H DAVID Dillon - CNS   Stopped at 01/23/21 0012    [Held by provider] aspirin EC tablet 81 mg  81 mg Oral Daily Watts Ruse, DO        atorvastatin (LIPITOR) tablet 40 mg  40 mg Oral Nightly Watts Ruse, DO   40 mg at 01/22/21 2123    [Held by provider] clopidogrel (PLAVIX) tablet 75 mg  75 mg Oral Daily Watts Ruse, DO        sacubitril-valsartan (ENTRESTO) 24-26 MG per tablet 0.5 tablet  0.5 tablet Oral BID Watts Ruse, DO   0.5 tablet at 01/22/21 2122    pantoprazole (PROTONIX) tablet 40 mg  40 mg Oral QAM AC Ismail U Shanda, DO   40 mg at 01/22/21 2333    metoprolol succinate (TOPROL XL) extended release tablet 25 mg  25 mg Oral Daily Watts Ruse, DO   25 mg at 01/22/21 7091    gabapentin (NEURONTIN) capsule 300 mg  300 mg Oral BID Watts Ruse, DO   300 mg at 01/22/21 2123    furosemide (LASIX) tablet 20 mg  20 mg Oral Daily Watts Ruse, DO   20 mg at 01/21/21 9808    ferrous sulfate (IRON 325) tablet 325 mg  325 mg Oral Daily with breakfast Watts Ruse, DO   325 mg at 01/22/21 1159    glucose (GLUTOSE) 40 % oral gel 15 g  15 g Oral PRN Stefanie Rodriguez, DO        dextrose 50 % IV solution  12.5 g Intravenous PRN Stefanie Rodriguez, DO        glucagon (rDNA) injection 1 mg  1 mg Intramuscular PRN Edith Perez, DO        dextrose 5 % solution  100 mL/hr Intravenous PRN Edith Perez, DO        sodium chloride flush 0.9 % injection 10 mL  10 mL Intravenous 2 times per day Edith Perez, DO   10 mL at 01/21/21 2141    sodium chloride flush 0.9 % injection 10 mL  10 mL Intravenous PRN Edith Perez, DO        enoxaparin (LOVENOX) injection 40 mg  40 mg Subcutaneous Daily Edith Perez, DO   40 mg at 01/21/21 3244    acetaminophen (TYLENOL) tablet 650 mg  650 mg Oral Q6H PRN Edith Perez, DO   650 mg at 01/21/21 2314    Or    acetaminophen (TYLENOL) suppository 650 mg  650 mg Rectal Q6H PRN Edith Perez, DO           Allergies:     Allergies   Allergen Reactions    Ace Inhibitors Other (See Comments)     cough    Percocet [Oxycodone-Acetaminophen] Other (See Comments)     Hallucinates very badly       Problem List:    Patient Active Problem List   Diagnosis Code    Diabetic ulcer of toe of right foot associated with type 2 diabetes mellitus ([address] E11.621, L97.519    Perforated bowel ([address] K63.1    CAD (coronary artery disease) I25.10    NSTEMI (non-ST elevated myocardial infarction) ([address] I21.4    Infected surgical wound T81.49XA    Cerebrovascular accident (CVA) due to stenosis of left middle cerebral artery (HCC) I63.512    Adhesive capsulitis of right shoulder M75.01    Shoulder impingement M75.40    Primary osteoarthritis of right knee M17.11    Cellulitis of foot L03.119       Past Medical History:        Diagnosis Date    Anxiety     CAD (coronary artery disease)     x5 stents    Cardiomyopathy ([address]     Cerebral artery occlusion with cerebral infarction ([address]     COPD (chronic obstructive pulmonary disease) ([address]     Depression     Diabetes mellitus ([address]     IDDM    Hx of cardiovascular stress test 3/22/2016    Lexiscan    Hyperlipidemia     Nonrheumatic mitral (valve) insufficiency     Nonrheumatic tricuspid Component Value Date     01/23/2021    K 4.5 01/23/2021    CL 98 01/23/2021    CO2 23 01/23/2021    BUN 31 01/23/2021    CREATININE 1.4 01/23/2021    GFRAA 47 01/23/2021    LABGLOM 39 01/23/2021    GLUCOSE 227 01/23/2021    GLUCOSE 179 06/17/2011    PROT 7.3 01/23/2021    CALCIUM 9.3 01/23/2021    BILITOT 0.2 01/23/2021    ALKPHOS 99 01/23/2021    AST 11 01/23/2021    ALT 12 01/23/2021       POC Tests: No results for input(s): POCGLU, POCNA, POCK, POCCL, POCBUN, POCHEMO, POCHCT in the last 72 hours. Coags:   Lab Results   Component Value Date    PROTIME 11.0 04/30/2018    INR 1.0 04/30/2018    APTT 27.8 04/30/2018       HCG (If Applicable): No results found for: PREGTESTUR, PREGSERUM, HCG, HCGQUANT     ABGs: No results found for: PHART, PO2ART, ITM0VDH, ORV1FKS, BEART, W9LUYRKJ     Type & Screen (If Applicable):  No results found for: LABABO, LABRH    Drug/Infectious Status (If Applicable):  No results found for: HIV, HEPCAB    COVID-19 Screening (If Applicable): No results found for: COVID19      Anesthesia Evaluation  Patient summary reviewed  Airway: Mallampati: II  TM distance: >3 FB   Neck ROM: full  Mouth opening: > = 3 FB Dental:    (+) upper dentures and lower dentures      Pulmonary: breath sounds clear to auscultation  (+) COPD:  sleep apnea:  current smoker (1 PPD. )                           Cardiovascular:    (+) hypertension:, valvular problems/murmurs (Non Rheumatic MI, TI.): MR, past MI:, CAD:, CABG/stent (Stent.):, hyperlipidemia      ECG reviewed  Rhythm: regular  Rate: normal  Echocardiogram reviewed         Beta Blocker:  Dose within 24 Hrs      ROS comment: Cardiomyopathy. EKG: Sinus Rhythm 80, Richard Inferior MI, with premature ventricular contractions. CATH:  1. Totally occluded OM stent with a totally occluded left circumflex artery stent with left-to-right collaterals. 2.  Moderate to severe disease involving the distal left main.   3.  Mild disease involving the right coronary artery. ECHO:   Borderline concentric left ventricular hypertrophy. Moderately dilated left ventricle. Ejection fraction is visually estimated at 32%. Overall ejection fraction severely decreased . E/A flow reversal noted. Suggestive of diastolic dysfunction. The left atrium is mildly dilated. Physiologic and/or trace mitral regurgitation is present. Physiologic and/or trace pulmonic regurgitation present. PCP is Dr. Oj Ibanez and ordering doctor is Nydia Yousif at fax   [phone]. Neuro/Psych:   (+) CVA:, psychiatric history:depression/anxiety             GI/Hepatic/Renal:             Endo/Other:    (+) DiabetesType II DM, , .                 Abdominal:   (+) obese,         Vascular: negative vascular ROS. Anesthesia Plan      general     ASA 3       Induction: intravenous. BIS    Anesthetic plan and risks discussed with patient. Plan discussed with CRNA.     Attending anesthesiologist reviewed and agrees with Kristi Mark MD   1/23/2021

## 2021-01-24 LAB
ALBUMIN SERPL-MCNC: 3.8 G/DL (ref 3.5–5.2)
ALP BLD-CCNC: 106 U/L (ref 35–104)
ALT SERPL-CCNC: 13 U/L (ref 0–32)
ANION GAP SERPL CALCULATED.3IONS-SCNC: 11 MMOL/L (ref 7–16)
AST SERPL-CCNC: 10 U/L (ref 0–31)
BASOPHILS ABSOLUTE: 0.08 E9/L (ref 0–0.2)
BASOPHILS RELATIVE PERCENT: 0.8 % (ref 0–2)
BILIRUB SERPL-MCNC: 0.3 MG/DL (ref 0–1.2)
BUN BLDV-MCNC: 33 MG/DL (ref 6–20)
CALCIUM SERPL-MCNC: 9.4 MG/DL (ref 8.6–10.2)
CHLORIDE BLD-SCNC: 98 MMOL/L (ref 98–107)
CO2: 23 MMOL/L (ref 22–29)
CREAT SERPL-MCNC: 1.3 MG/DL (ref 0.5–1)
EOSINOPHILS ABSOLUTE: 0.47 E9/L (ref 0.05–0.5)
EOSINOPHILS RELATIVE PERCENT: 4.9 % (ref 0–6)
GFR AFRICAN AMERICAN: 51
GFR NON-AFRICAN AMERICAN: 42 ML/MIN/1.73
GLUCOSE BLD-MCNC: 220 MG/DL (ref 74–99)
HCT VFR BLD CALC: 40.7 % (ref 34–48)
HEMOGLOBIN: 12.9 G/DL (ref 11.5–15.5)
IMMATURE GRANULOCYTES #: 0.03 E9/L
IMMATURE GRANULOCYTES %: 0.3 % (ref 0–5)
LYMPHOCYTES ABSOLUTE: 1.66 E9/L (ref 1.5–4)
LYMPHOCYTES RELATIVE PERCENT: 17.5 % (ref 20–42)
MCH RBC QN AUTO: 27.8 PG (ref 26–35)
MCHC RBC AUTO-ENTMCNC: 31.7 % (ref 32–34.5)
MCV RBC AUTO: 87.7 FL (ref 80–99.9)
METER GLUCOSE: 185 MG/DL (ref 74–99)
METER GLUCOSE: 219 MG/DL (ref 74–99)
METER GLUCOSE: 258 MG/DL (ref 74–99)
METER GLUCOSE: 304 MG/DL (ref 74–99)
MONOCYTES ABSOLUTE: 0.84 E9/L (ref 0.1–0.95)
MONOCYTES RELATIVE PERCENT: 8.8 % (ref 2–12)
NEUTROPHILS ABSOLUTE: 6.43 E9/L (ref 1.8–7.3)
NEUTROPHILS RELATIVE PERCENT: 67.7 % (ref 43–80)
PDW BLD-RTO: 13.2 FL (ref 11.5–15)
PLATELET # BLD: 387 E9/L (ref 130–450)
PMV BLD AUTO: 9.9 FL (ref 7–12)
POTASSIUM SERPL-SCNC: 5 MMOL/L (ref 3.5–5)
RBC # BLD: 4.64 E12/L (ref 3.5–5.5)
SODIUM BLD-SCNC: 132 MMOL/L (ref 132–146)
TOTAL PROTEIN: 7.7 G/DL (ref 6.4–8.3)
WBC # BLD: 9.5 E9/L (ref 4.5–11.5)

## 2021-01-24 PROCEDURE — 85025 COMPLETE CBC W/AUTO DIFF WBC: CPT

## 2021-01-24 PROCEDURE — 99232 SBSQ HOSP IP/OBS MODERATE 35: CPT | Performed by: INTERNAL MEDICINE

## 2021-01-24 PROCEDURE — 82962 GLUCOSE BLOOD TEST: CPT

## 2021-01-24 PROCEDURE — 6370000000 HC RX 637 (ALT 250 FOR IP): Performed by: THORACIC SURGERY (CARDIOTHORACIC VASCULAR SURGERY)

## 2021-01-24 PROCEDURE — 6370000000 HC RX 637 (ALT 250 FOR IP): Performed by: PODIATRIST

## 2021-01-24 PROCEDURE — 94640 AIRWAY INHALATION TREATMENT: CPT

## 2021-01-24 PROCEDURE — 6360000002 HC RX W HCPCS: Performed by: THORACIC SURGERY (CARDIOTHORACIC VASCULAR SURGERY)

## 2021-01-24 PROCEDURE — 6370000000 HC RX 637 (ALT 250 FOR IP): Performed by: INTERNAL MEDICINE

## 2021-01-24 PROCEDURE — 6360000002 HC RX W HCPCS: Performed by: INTERNAL MEDICINE

## 2021-01-24 PROCEDURE — 2580000003 HC RX 258: Performed by: THORACIC SURGERY (CARDIOTHORACIC VASCULAR SURGERY)

## 2021-01-24 PROCEDURE — 1200000000 HC SEMI PRIVATE

## 2021-01-24 PROCEDURE — 2580000003 HC RX 258: Performed by: INTERNAL MEDICINE

## 2021-01-24 PROCEDURE — 80053 COMPREHEN METABOLIC PANEL: CPT

## 2021-01-24 PROCEDURE — 36415 COLL VENOUS BLD VENIPUNCTURE: CPT

## 2021-01-24 RX ORDER — DIPHENHYDRAMINE HYDROCHLORIDE 50 MG/ML
50 INJECTION INTRAMUSCULAR; INTRAVENOUS ONCE
Status: CANCELLED | OUTPATIENT
Start: 2021-01-26 | End: 2021-01-26

## 2021-01-24 RX ORDER — HEPARIN SODIUM (PORCINE) LOCK FLUSH IV SOLN 100 UNIT/ML 100 UNIT/ML
3 SOLUTION INTRAVENOUS PRN
Status: DISCONTINUED | OUTPATIENT
Start: 2021-01-24 | End: 2021-01-25 | Stop reason: HOSPADM

## 2021-01-24 RX ORDER — SODIUM CHLORIDE 0.9 % (FLUSH) 0.9 %
10 SYRINGE (ML) INJECTION PRN
Status: CANCELLED | OUTPATIENT
Start: 2021-01-26

## 2021-01-24 RX ORDER — EPINEPHRINE 1 MG/ML
0.3 INJECTION, SOLUTION, CONCENTRATE INTRAVENOUS PRN
Status: CANCELLED | OUTPATIENT
Start: 2021-01-26

## 2021-01-24 RX ORDER — HEPARIN SODIUM (PORCINE) LOCK FLUSH IV SOLN 100 UNIT/ML 100 UNIT/ML
3 SOLUTION INTRAVENOUS EVERY 12 HOURS SCHEDULED
Status: DISCONTINUED | OUTPATIENT
Start: 2021-01-24 | End: 2021-01-25 | Stop reason: HOSPADM

## 2021-01-24 RX ORDER — SODIUM CHLORIDE 0.9 % (FLUSH) 0.9 %
5 SYRINGE (ML) INJECTION PRN
Status: CANCELLED | OUTPATIENT
Start: 2021-01-26

## 2021-01-24 RX ORDER — METHYLPREDNISOLONE SODIUM SUCCINATE 125 MG/2ML
125 INJECTION, POWDER, LYOPHILIZED, FOR SOLUTION INTRAMUSCULAR; INTRAVENOUS ONCE
Status: CANCELLED | OUTPATIENT
Start: 2021-01-26 | End: 2021-01-26

## 2021-01-24 RX ORDER — HEPARIN SODIUM (PORCINE) LOCK FLUSH IV SOLN 100 UNIT/ML 100 UNIT/ML
500 SOLUTION INTRAVENOUS PRN
Status: CANCELLED | OUTPATIENT
Start: 2021-01-26

## 2021-01-24 RX ORDER — LIDOCAINE HYDROCHLORIDE 10 MG/ML
5 INJECTION, SOLUTION EPIDURAL; INFILTRATION; INTRACAUDAL; PERINEURAL ONCE
Status: DISCONTINUED | OUTPATIENT
Start: 2021-01-24 | End: 2021-01-25 | Stop reason: HOSPADM

## 2021-01-24 RX ORDER — SODIUM CHLORIDE 0.9 % (FLUSH) 0.9 %
10 SYRINGE (ML) INJECTION PRN
Status: DISCONTINUED | OUTPATIENT
Start: 2021-01-24 | End: 2021-01-25 | Stop reason: HOSPADM

## 2021-01-24 RX ORDER — SODIUM CHLORIDE 9 MG/ML
INJECTION, SOLUTION INTRAVENOUS CONTINUOUS
Status: CANCELLED | OUTPATIENT
Start: 2021-01-26

## 2021-01-24 RX ADMIN — FERROUS SULFATE TAB 325 MG (65 MG ELEMENTAL FE) 325 MG: 325 (65 FE) TAB at 07:56

## 2021-01-24 RX ADMIN — ARFORMOTEROL TARTRATE 15 MCG: 15 SOLUTION RESPIRATORY (INHALATION) at 18:47

## 2021-01-24 RX ADMIN — INSULIN LISPRO 3 UNITS: 100 INJECTION, SOLUTION INTRAVENOUS; SUBCUTANEOUS at 21:27

## 2021-01-24 RX ADMIN — PANTOPRAZOLE SODIUM 40 MG: 40 TABLET, DELAYED RELEASE ORAL at 05:42

## 2021-01-24 RX ADMIN — SACUBITRIL AND VALSARTAN 0.5 TABLET: 24; 26 TABLET, FILM COATED ORAL at 21:26

## 2021-01-24 RX ADMIN — VANCOMYCIN HYDROCHLORIDE 1250 MG: 10 INJECTION, POWDER, LYOPHILIZED, FOR SOLUTION INTRAVENOUS at 12:25

## 2021-01-24 RX ADMIN — OXYCODONE HYDROCHLORIDE AND ACETAMINOPHEN 1000 MG: 500 TABLET ORAL at 07:56

## 2021-01-24 RX ADMIN — INSULIN LISPRO 4 UNITS: 100 INJECTION, SOLUTION INTRAVENOUS; SUBCUTANEOUS at 12:25

## 2021-01-24 RX ADMIN — ESCITALOPRAM OXALATE 20 MG: 10 TABLET ORAL at 07:57

## 2021-01-24 RX ADMIN — CYANOCOBALAMIN TAB 1000 MCG 1000 MCG: 1000 TAB at 07:56

## 2021-01-24 RX ADMIN — Medication 50 MG: at 07:56

## 2021-01-24 RX ADMIN — SODIUM CHLORIDE: 9 INJECTION, SOLUTION INTRAVENOUS at 10:01

## 2021-01-24 RX ADMIN — DESMOPRESSIN ACETATE 40 MG: 0.2 TABLET ORAL at 21:26

## 2021-01-24 RX ADMIN — FUROSEMIDE 20 MG: 20 TABLET ORAL at 07:57

## 2021-01-24 RX ADMIN — INSULIN LISPRO 4 UNITS: 100 INJECTION, SOLUTION INTRAVENOUS; SUBCUTANEOUS at 17:13

## 2021-01-24 RX ADMIN — GABAPENTIN 300 MG: 300 CAPSULE ORAL at 21:26

## 2021-01-24 RX ADMIN — CLOPIDOGREL BISULFATE 75 MG: 75 TABLET ORAL at 07:56

## 2021-01-24 RX ADMIN — TRAZODONE HYDROCHLORIDE 50 MG: 50 TABLET ORAL at 21:26

## 2021-01-24 RX ADMIN — HYDROCODONE BITARTRATE AND ACETAMINOPHEN 1 TABLET: 5; 325 TABLET ORAL at 17:12

## 2021-01-24 RX ADMIN — METFORMIN HYDROCHLORIDE 1000 MG: 1000 TABLET, FILM COATED ORAL at 17:12

## 2021-01-24 RX ADMIN — METOPROLOL SUCCINATE 25 MG: 25 TABLET, EXTENDED RELEASE ORAL at 07:56

## 2021-01-24 RX ADMIN — SACUBITRIL AND VALSARTAN 0.5 TABLET: 24; 26 TABLET, FILM COATED ORAL at 07:56

## 2021-01-24 RX ADMIN — GABAPENTIN 300 MG: 300 CAPSULE ORAL at 07:57

## 2021-01-24 RX ADMIN — CEFEPIME 2000 MG: 2 INJECTION, POWDER, FOR SOLUTION INTRAMUSCULAR; INTRAVENOUS at 17:12

## 2021-01-24 RX ADMIN — ASPIRIN 81 MG: 81 TABLET, COATED ORAL at 07:56

## 2021-01-24 RX ADMIN — ENOXAPARIN SODIUM 40 MG: 40 INJECTION SUBCUTANEOUS at 07:56

## 2021-01-24 RX ADMIN — CEFEPIME 2000 MG: 2 INJECTION, POWDER, FOR SOLUTION INTRAMUSCULAR; INTRAVENOUS at 05:42

## 2021-01-24 RX ADMIN — Medication 10 ML: at 21:27

## 2021-01-24 RX ADMIN — INSULIN GLARGINE 35 UNITS: 100 INJECTION, SOLUTION SUBCUTANEOUS at 07:57

## 2021-01-24 RX ADMIN — ARFORMOTEROL TARTRATE 15 MCG: 15 SOLUTION RESPIRATORY (INHALATION) at 06:35

## 2021-01-24 RX ADMIN — HYDROCODONE BITARTRATE AND ACETAMINOPHEN 1 TABLET: 5; 325 TABLET ORAL at 06:02

## 2021-01-24 RX ADMIN — INSULIN GLARGINE 35 UNITS: 100 INJECTION, SOLUTION SUBCUTANEOUS at 21:27

## 2021-01-24 RX ADMIN — BUDESONIDE 500 MCG: 0.5 INHALANT RESPIRATORY (INHALATION) at 06:35

## 2021-01-24 RX ADMIN — BUDESONIDE 500 MCG: 0.5 INHALANT RESPIRATORY (INHALATION) at 18:47

## 2021-01-24 ASSESSMENT — PAIN SCALES - GENERAL
PAINLEVEL_OUTOF10: 3
PAINLEVEL_OUTOF10: 6

## 2021-01-24 NOTE — PROGRESS NOTES
Pharmacy Consultation Note  (Antibiotic Dosing and Monitoring)    Initial consult date: 1/20/2021  Consulting physician: Dr. Ozzy Mendiola  Drug(s): Vancomycin  Indication: Diabetic foot infection    Ht Readings from Last 1 Encounters:   01/19/21 5' 9\" (1.753 m)     Wt Readings from Last 1 Encounters:   01/19/21 245 lb (111.1 kg)     Age/  Gender IBW DW  Allergy Information   63 y.o.   female 66.2 kg 111.1 kg  Ace inhibitors and Percocet [oxycodone-acetaminophen]          Date  Tmax WBC BUN/CR UOP  (mL/kg/hr) Drug/Dose Time   Given Level(s)   (Time) Comments   1/19  (#1) afebrile 14.1 32/1.5 -- Vancomycin 2000 mg IV x 1 2225 1/20  (#2) afebrile 8.8 31/1.4 -- Vancomycin 1750 mg IV q24hr 2222 1/21  (#3) afebrile 6.6 27/1.3 -- Vancomycin 1750 mg IV q24hr 2300     1/22  (#4) afebrile 8.9 28/1.2 -- Vancomycin 1750 mg IV q24hr 2130  Dose hung an hour early and prior to the scheduled trough   1/23  (#5) afebrile 7.7 31/1.4 -- Vancomycin 1750 mg IV q24hr  Trough @ 2340 = 24.7 mcg/mL Dose held due to supratherapeutic trough   1/24  (#6) afebrile 9.5 33/1.3 -- Vancomycin 1250 mg IV q24hr <1230>       (#7)             Estimated Creatinine Clearance: 64 mL/min (A) (based on SCr of 1.3 mg/dL (H)). UOP over the past 24 hours:       Intake/Output Summary (Last 24 hours) at 1/24/2021 1124  Last data filed at 1/24/2021 1000  Gross per 24 hour   Intake 220 ml   Output 502 ml   Net -282 ml     Other anti-infectives: Anti-infective Dose Date Initiated Date Stopped   Pip/tazo 3.375g IV q8hr 1/20 1/20   Cefepime 2g IV q12hr 1/20      Cultures:  available culture and sensitivity results were reviewed in EPIC  Cultures sent and are pending.   Culture Date Result    Blood cx 1 1/19 NGTD   Blood cx 2 1/19 NGTD   Wound cx  (R foot) 1/20 MRSA   MRSA nares 1/20 Not isolated     Assessment:  · Consulted by Dr. Ozzy Mendiola to dose/monitor vancomycin  · Goal trough level:  15-20 mcg/mL  · Pt is a 63 yoF who presented from home with R diabetic foot infection and cellulitis. MRI ordered to rule out osteomyelitis. · Serum creatinine today: 1.3 mg/dL; CrCl ~ 64 mL/min; baseline Scr unknown (was 0.8 - 1 in 2016).     Plan:  · Vancomycin 1750 mg IV q24hr  · Repeat trough prior to fourth dose of new regimen  · Nursing communication order to ensure trough drawn prior to next dose  · Follow renal function  · Pharmacist will follow and monitor/adjust dosing as necessary      Thank you for the consult,    Virginia Olivas PharmD 1/24/2021 11:24 AM   [phone]

## 2021-01-24 NOTE — PROGRESS NOTES
[facility]        CARDIOLOGY                 INPATIENT PROGRESS NOTE          PATIENT SEEN IN FOLLOW UP FOR: CAD,. PreOp [facility] Day: 6     Rupal Delarosa is a 63 year old patient known to Dr. Chester Luque: Denies any CP or SOB No palpitations. Had Right SFA PTCA. ROS: Review of rest of 8systems negative except as mentioned above    OBJECTIVE: No acute distress. See Assessment     Diagnostics:       Telemetry: Sinus          Intake/Output Summary (Last 24 hours) at 1/24/2021 1242  Last data filed at 1/24/2021 1000  Gross per 24 hour   Intake 220 ml   Output 500 ml   Net -280 ml       Labs:   CBC:   Recent Labs     01/23/21  0505 01/24/21  0953   WBC 7.7 9.5   HGB 13.2 12.9   HCT 41.7 40.7    387     BMP:   Recent Labs     01/23/21  0505 01/24/21  0953   * 132   K 4.5 5.0   CO2 23 23   BUN 31* 33*   CREATININE 1.4* 1.3*   LABGLOM 39 42   CALCIUM 9.3 9.4     Mag: No results for input(s): MG in the last 72 hours. Phos: No results for input(s): PHOS in the last 72 hours. TSH: No results for input(s): TSH in the last 72 hours. HgA1c:     BNP: No results for input(s): BNP in the last 72 hours. PT/INR: No results for input(s): PROTIME, INR in the last 72 hours. APTT:No results for input(s): APTT in the last 72 hours. CARDIAC ENZYMES:No results for input(s): CKTOTAL, CKMB, CKMBINDEX, TROPONINI in the last 72 hours.   FASTING LIPID PANEL:  Lab Results   Component Value Date    CHOL 127 01/20/2021    HDL 27 01/20/2021    LDLCALC 53 01/20/2021    TRIG 237 01/20/2021     LIVER PROFILE:  Recent Labs     01/23/21  0505 01/24/21  0953   AST 11 10   ALT 12 13   LABALBU 3.6 3.8       Current Inpatient Medications:   vancomycin  1,250 mg Intravenous Q24H    aspirin  81 mg Oral Daily    clopidogrel  75 mg Oral Daily    insulin glargine  35 Units Subcutaneous BID    Arformoterol Tartrate  15 mcg Nebulization BID    And    budesonide  0.5 mg Nebulization BID  vitamin C  1,000 mg Oral Daily    escitalopram  20 mg Oral Daily    vitamin B-12  1,000 mcg Oral Daily    zinc sulfate  50 mg Oral Daily    traZODone  50 mg Oral Nightly    insulin lispro  0-6 Units Subcutaneous TID WC    insulin lispro  0-3 Units Subcutaneous Nightly    cefepime  2,000 mg Intravenous Q12H    atorvastatin  40 mg Oral Nightly    sacubitril-valsartan  0.5 tablet Oral BID    pantoprazole  40 mg Oral QAM AC    metoprolol succinate  25 mg Oral Daily    gabapentin  300 mg Oral BID    furosemide  20 mg Oral Daily    ferrous sulfate  325 mg Oral Daily with breakfast    sodium chloride flush  10 mL Intravenous 2 times per day    enoxaparin  40 mg Subcutaneous Daily       IV Infusions (if any):   sodium chloride Stopped (21 1220)    dextrose           PHYSICAL EXAM:     CONSTITUTIONAL:   BP (!) 115/56   Pulse 72   Temp 98.2 °F (36.8 °C) (Oral)   Resp 18   Ht 5' 9\" (1.753 m)   Wt 245 lb (111.1 kg)   SpO2 92%   BMI 36.18 kg/m²   Pulse  Av  Min: 63  Max: 85  Systolic (67JPJ), JJR:074 , Min:115 , HQE:909    Diastolic (66OEU), RXZ:12, Min:56, Max:66      In general, this is a well developed, well nourished who appears stated age. awake, alert, cooperative, no apparent distress     HEENT: eyes -conjunctivae pink,   Neck-  no stridor, no carotid bruit. no jugular venous distention   RESPIRATORY: No accessory muscles use. Lung auscultation - few rhonchi  CARDIOVASCULAR:     Heart Inspection shows no noted pulsations  Heart Palpation - no palpable thrills  Heart Ausculation - Regular rate and rhythm, 1/6 systolic murmur, No s3 or rub.   + lower extremity edema, no varicosities. Left distal pulses palpable, Right foot in dressing. ABDOMEN: Bowel sounds present. MS: n/a.   : Deferred  Rectal Exam: Deferred  SKIN: warm and dry  NEURO / PSYCH: oriented to person, place           Impression/Recommendations:     Preoperative Cardiac risk stratification - denies CP, no actue CHf - Cleared, from cardiac stand point, for her surgical procedures.     Right lower extremity cellulitis and possible osteomyelitis of second toe of right foot. S/p I&D within the last week.      PAD - s/p PTCA of Right SFA     Coronary artery disease s/p prior PCI and stent placement s/p CABG at [facility] in May 2018 - Denies chest pain. Stable. -Continue ASA, BB, Statin      Chronic HFrEF - Last EF on TTE 30-35% in 2018. Patient is on appropriate GDMT - BB, Entresto. Stable without any ventricular arrhythmias. Ischemic cardiomyopathy.      Moderate TR, mild MR with mild pulmonary hypertension on TTE 2018.     Hypertension, Controlled.     Hyperlipidemia, on statin therapy.      Obesity.      Current tobacco abuse - Counseled to quit smoking      Diabetes mellitus type II with peripheral neuropathy. History of CVA.     Obstructive sleep apnea, compliant with CPAP.     Chronic obstructive pulmonary disease. Diabetic ulcer of toe of right foot associated with type 2 diabetes mellitus [facility])            Cardiology sign off    F/u by Dr Jh Orozco 2-4 weeks    Above recommendations discussed with her.       Electronically signed by Maribeth Del Castillo MD on 1/24/2021 at 12:42 PM  [facility]) Cardiology

## 2021-01-24 NOTE — PROGRESS NOTES
Podiatry Resident Progress Note    SUBJECTIVE:   Juanjose Cummins was seen at bedside s/p 1 day for right foot second digit amputation. No overnight events noted. Patient states she is doing well overall, had a good night's sleep but confesses to walking on her right foot to go to the bathroom, after which she noted some bleeding through her dressings. Patient denies fever, chills, nausea, vomiting, shortness of breath, and chest pain. Past Medical History:   Diagnosis Date    Anxiety     CAD (coronary artery disease)     x5 stents    [facility]     Cerebral artery occlusion with cerebral infarction ([facility]     COPD (chronic obstructive pulmonary disease) ([facility]     Depression     Diabetes mellitus ([facility]     IDDM    Hx of cardiovascular stress test 3/22/2016    Lexiscan    Hyperlipidemia     Nonrheumatic mitral (valve) insufficiency     Nonrheumatic tricuspid (valve) insufficiency     Sleep apnea     cpap    Smoker     current 1PPD X35yrs        Past Surgical History:   Procedure Laterality Date    ABDOMEN SURGERY      ABDOMINAL EXPLORATION SURGERY N/A 6-5-16    Excision perforated mid body gastric ulcer, primary closure omentum patch    BACK SURGERY      CARDIAC CATHETERIZATION  05/03/2018    Dr. Yoon Giraldo CATH LAB PROCEDURE  05/03/2018    Dr. Regine Benton multiple PCIs     KNEE ARTHROPLASTY      TRANSESOPHAGEAL ECHOCARDIOGRAM  10/28/2016    Dr. Regine Benton r/o embolic source         Family History   Problem Relation Age of Onset    Heart Attack Father         Social History     Tobacco Use    Smoking status: Current Every Day Smoker     Packs/day: 1.00    Smokeless tobacco: Never Used   Substance Use Topics    Alcohol use: No     Comment: 2 cups of coffee a day         Prior to Admission medications    Medication Sig Start Date End Date Taking?  Authorizing Provider canagliflozin (INVOKANA) 300 MG TABS tablet Take 300 mg by mouth every morning (before breakfast)   Yes Historical Provider, MD   Biotin 26900 MCG TABS Take by mouth   Yes Historical Provider, MD   zinc 50 MG TABS tablet Take 50 mg by mouth daily   Yes Historical Provider, MD   vitamin B-12 (CYANOCOBALAMIN) 1000 MCG tablet Take 1,000 mcg by mouth daily   Yes Historical Provider, MD   Turmeric (QC TUMERIC COMPLEX) 500 MG CAPS Take by mouth   Yes Historical Provider, MD   Ascorbic Acid (VITAMIN C) 1000 MG tablet Take 1,000 mg by mouth daily   Yes Historical Provider, MD   Fluticasone Propionate (FLONASE ALLERGY RELIEF NA) by Nasal route   Yes Historical Provider, MD   metoprolol succinate (TOPROL XL) 25 MG extended release tablet Take 1 tablet by mouth daily 9/25/20  Yes Shad Jesus MD   furosemide (LASIX) 20 MG tablet Take 1 tablet by mouth daily 9/25/20  Yes Shad Jesus MD   sacubitril-valsartan (ENTRESTO) 24-26 MG per tablet Take 0.5 tablets by mouth 2 times daily Take a half of a tablet BID 4/2/20  Yes Shad Jesus MD   Cholecalciferol (VITAMIN D) 125 MCG (5000 UT) CAPS Take 5,000 Units by mouth   Yes Historical Provider, MD   insulin glargine (BASAGLAR KWIKPEN) 100 UNIT/ML injection pen Inject 26 Units into the skin 2 times daily 26 am   30 units in pm   Yes Historical Provider, MD   acetaminophen (TYLENOL) 325 MG tablet Take 650 mg by mouth 2 times daily as needed for Pain   Yes Historical Provider, MD   pantoprazole (PROTONIX) 40 MG tablet  6/7/17  Yes Historical Provider, MD   atorvastatin (LIPITOR) 40 MG tablet  6/7/17  Yes Historical Provider, MD   escitalopram (LEXAPRO) 20 MG tablet  6/7/17  Yes Historical Provider, MD   ferrous sulfate 325 (65 Fe) MG tablet  6/7/17  Yes Historical Provider, MD   traZODone (DESYREL) 50 MG tablet Take 50 mg by mouth nightly   Yes Historical Provider, MD   gabapentin (NEURONTIN) 300 MG capsule Take 300 mg by mouth 2 times daily.  Juarez John Historical Provider, MD clopidogrel (PLAVIX) 75 MG tablet Take 75 mg by mouth daily   Yes Historical Provider, MD   aspirin 81 MG EC tablet Take 1 tablet by mouth daily 10/28/16  Yes Mk Daugherty MD   metFORMIN (GLUCOPHAGE) 1000 MG tablet Take 1 tablet by mouth 2 times daily (with meals) 10/28/16  Yes Mk Daugherty MD   BREO ELLIPTA 200-25 MCG/INH AEPB  6/9/17   Historical Provider, MD   albuterol sulfate  (90 BASE) MCG/ACT inhaler Inhale 2 puffs into the lungs every 6 hours as needed for Wheezing    Historical Provider, MD      Ace inhibitors and Percocet [oxycodone-acetaminophen]       OBJECTIVE:      VITALS:  Vitals:    01/24/21 0645   BP: (!) 127/59   Pulse: 63   Resp: 18   Temp: 97.9 °F (36.6 °C)   SpO2: 93%        LABS:   Recent Labs     01/22/21  0345 01/23/21  0505   WBC 8.9 7.7   HGB 12.8 13.2   HCT 40.2 41.7    377       PHYSICAL EXAM:  Vascular:    DP / PT pulses nonpalpable bilaterally. Resolving edema present bilaterally. Warm to warm temp gradient along right LE. Pedal hair absent. Neurologic:    Protective sensation is decreased in distal lower extremity bilaterally - previously verified using Gary touch test.    Dermatologic:    Right second digit amputation site is stable without dehiscence, drainage or active bleeding at the time of evaluation. Dried blood noted to dressings and to surgical site. Sutures are intact and in intended position. No SOI. Musculoskeletal:   No pain to surgical site, just some mild tenderness upon palpation. Digital contracture of lesser digits bilaterally. Muscle strength 5/5 to all lower extremity groups tested bilaterally. Active/passive ROM is WNL and not painful in all lower extremity joints tested. Able to wiggle digits. No pain on compression of calves.         IMAGING:  Xr Foot Right (min 3 Views)    Result Date: 1/19/2021  EXAMINATION: THREE XRAY VIEWS OF THE RIGHT FOOT 1/19/2021 9:57 pm COMPARISON: Right foot series from December 17, 2008 HISTORY: ORDERING SYSTEM PROVIDED HISTORY: pain TECHNOLOGIST PROVIDED HISTORY: Reason for exam:->pain FINDINGS: Radiographs of the right foot demonstrate no fractures with preserved alignment. Mild degenerative spurring of the right large toe metatarsal phalangeal joint. Prominent anterior and posterior calcaneal enthesophytes. No erosive changes. Osseous mineralization is normal.  No soft tissue abnormality. Arteriosclerosis present. There appears to be some soft tissue irregularity of the dorsal and palmar aspect of the forefoot on the lateral view. 1.  No acute osseous findings about the right foot. No erosive changes seen. There appears to be some soft tissue irregularity to the dorsal and palmar aspect of the forefoot on the lateral view. 2. Mild right large toe osteoarthritis. Calcaneal enthesophytes. 3.  Arteriosclerosis. ASSESEMENT:  1. Right second digit wounds (x2) POA, clinically infected  2. Acute osteomyelitis of digit  3. DM II with peripheral neuropathy  4. PVD   5. Tobacco abuse with associated vascular changes  6. Failed OP PO abx - keflex/doxy  7. Pedal xerosis  8. Hyperkeratotic lesions sub 1 & 5  9. S/P right foot, second digit amputation due to infection    Active Problems:    Cellulitis of foot       PLAN:  -Patient evaluated and examined. All labs, charts and images reviewed. -Dressings changed this morning: Betadine, Xeroform,  DSD. -Previous MRI and NIVS reviewed: OM of right 2nd digit. Suggestive of PVD, likely severe fem-pop dx of ASA CERVANTES. -WBC: 7.7  -Antibiotics per IM/ID:Vanco, cefepime  -Patient Is to remain NWB at all times, with the exception of transfer purposes (ie. Bed to chair), may be partial WB-50% to heel.  -Post-op surgical shoe ordered.   -Patient should elevate foot as much as tolerable.    -Will continue to follow while in hospital.      DW:DEMETRIUS [place] - [facility] [place] [facility]  Fellowship-Trained Foot and Ankle Surgeon  Diplomate, American Board of Foot and Ankle Surgeons  [phone]

## 2021-01-24 NOTE — PROGRESS NOTES
[facility]  [facility]  Progress Note    NAME: Ludin Muñiz  MR:  [id number]  :   1965  DATE OF SERVICE:21    This is a face to face encounter with Ludin Muñiz 63 y.o. female on 21  ID following for   Chief Complaint   Patient presents with    Wound Check     Wound check to the right foot. Patient told to come to the ER by Dr Charisse Velasquez. SUBJECTIVE:  IN BED   S/P ANGIO  S/p amp 2nd toe   NO F/C/N/V/D  FOOT DRESSED  ATTEMPTED TO DISCUSS D/C PLANNING   BUT PT BECAME OFFENDED  NEVERTHELESS WILL START D/C PPLAN  Patient is tolerating medications. No reported adverse drug reactions. Review of systems:  As stated above in the chief complaint, otherwise negative.     Medications:  Scheduled Meds:   vancomycin  1,250 mg Intravenous Q24H    insulin lispro  0-12 Units Subcutaneous TID WC    insulin lispro  0-6 Units Subcutaneous Nightly    metFORMIN  1,000 mg Oral BID WC    aspirin  81 mg Oral Daily    clopidogrel  75 mg Oral Daily    insulin glargine  35 Units Subcutaneous BID    Arformoterol Tartrate  15 mcg Nebulization BID    And    budesonide  0.5 mg Nebulization BID    vitamin C  1,000 mg Oral Daily    escitalopram  20 mg Oral Daily    vitamin B-12  1,000 mcg Oral Daily    zinc sulfate  50 mg Oral Daily    traZODone  50 mg Oral Nightly    cefepime  2,000 mg Intravenous Q12H    atorvastatin  40 mg Oral Nightly    sacubitril-valsartan  0.5 tablet Oral BID    pantoprazole  40 mg Oral QAM AC    metoprolol succinate  25 mg Oral Daily    gabapentin  300 mg Oral BID    furosemide  20 mg Oral Daily    ferrous sulfate  325 mg Oral Daily with breakfast    sodium chloride flush  10 mL Intravenous 2 times per day    enoxaparin  40 mg Subcutaneous Daily     Continuous Infusions:   sodium chloride Stopped (21 1220)    dextrose       PRN Meds:HYDROcodone 5 mg - acetaminophen, glucose, dextrose, glucagon (rDNA), dextrose, sodium chloride flush, acetaminophen **OR** acetaminophen    OBJECTIVE:  BP (!) 116/58   Pulse 73   Temp 98.1 °F (36.7 °C) (Oral)   Resp 18   Ht 5' 9\" (1.753 m)   Wt 245 lb (111.1 kg)   SpO2 92%   BMI 36.18 kg/m²   Temp  Av.1 °F (36.7 °C)  Min: 97.9 °F (36.6 °C)  Max: 98.5 °F (36.9 °C)  Constitutional:  The patient is awake, alert, and oriented. SUPINE POST ANGIO  Skin:    Warm and dry. HEENT:    AT/NC    Extremities:     FOOT DRESSED postop   CNS    AAxO   Lines: pIV        Radiology:  Laboratory and Tests Review:  Lab Results   Component Value Date    WBC 9.5 2021    WBC 7.7 2021    WBC 8.9 2021    HGB 12.9 2021    HCT 40.7 2021    MCV 87.7 2021     2021     No results found for: CRP  Lab Results   Component Value Date    ALT 13 2021    AST 10 2021    ALKPHOS 106 (H) 2021    BILITOT 0.3 2021     Lab Results   Component Value Date     2021    K 5.0 2021    CL 98 2021    CO2 23 2021    BUN 33 2021    CREATININE 1.3 2021    CREATININE 1.4 2021    CREATININE 1.2 2021    GFRAA 51 2021    LABGLOM 42 2021    GLUCOSE 220 2021    GLUCOSE 179 2011    PROT 7.7 2021    LABALBU 3.8 2021    LABALBU 4.2 2011    CALCIUM 9.4 2021    BILITOT 0.3 2021    ALKPHOS 106 2021    AST 10 2021    ALT 13 2021     Lab Results   Component Value Date    CRP 1.3 (H) 2021    CRP 2.7 (H) 2016    CRP 4.4 (H) 2016     Lab Results   Component Value Date    SEDRATE 44 (H) 2021    SEDRATE 86 (H) 2016    SEDRATE 95 (H) 2016       Microbiology:   No results for input(s): COVID19 in the last 72 hours.   Lab Results   Component Value Date    BLOODCULT2 24 Hours no growth 2021    BLOODCULT2 5 Days- no growth 2016    ORG Staphylococcus aureus 2021    ORG Staphylococcus aureus 2016    ORG Candida glabrata 06/05/2016    ORG Candida albicans 06/05/2016    ORG Candida species 06/05/2016     WOUND/ABSCESS   Date Value Ref Range Status   01/20/2021   Final    Light growth  Methicillin resistant Staph aureus isolated. Most Methicillin  resistant Staphylococcus are usually resistant to multiple  antibiotics including other B-Lactams, Aminoglycosides,  Macrolides, Clindamycin and Tetracycline. Contact isolation  is indicated. 06/24/2016   Final    Heavy growth  Methicillin resistant Staph aureus isolated. Most Methicillin  resistant Staphylococcus are usually resistant to multiple  antibiotics including other B-Lactams, Aminoglycosides,  Macrolides, Clindamycin and Tetracycline. Contact isolation  is indicated. 07/28/2015 Moderate growth  Final   07/28/2015   Final    Moderate growth  Methicillin resistant Staph aureus isolated. Most Methicillin  resistant Staphylococcus are usually resistant to multiple  antibiotics including other B-Lactams, Aminoglycosides,  Macrolides, Clindamycin and Tetracycline. Contact isolation  is indicated. This isolate is presumed to be resistant based on the  detection of inducible Clindamycin resistance. Clindamycin  may still be effective in some patients. MRSA Culture Only   Date Value Ref Range Status   01/20/2021 Methicillin resistant Staph aureus not isolated  Final      ASSESSMENT/PLAN:  Leukocytosis  Cellulitis right foot MRSA  Dfu/infection right second toe  S/p amp seconf toe wound open 1/23  PAD  s/p Angiogram and revascularization        vancomycin (VANCOCIN) 1,750 mg in dextrose 5 % 500 mL IVPB, Q24H FOLLOWED BY PHARMACY     cefepime (MAXIPIME) 2000 mg IVPB extended (mini-bag), Q12H STOP    await final cx MRSA  WILL D/C WITH DAPTO   · Monitor labs    Imaging and labs were reviewed per medical records. The patient was educated about the diagnosis, prognosis, indications, risks and benefits of treatment.      An opportunity to ask questions was given to the patient/FAMILY. Thank you for involving me in the care of Mariah Loges will continue to follow. Please do not hesitate to call for any questions or concerns.     Electronically signed by Anabel Lincoln MD on 1/24/2021 at 4:57 PM

## 2021-01-24 NOTE — PROGRESS NOTES
recorded. Intake/Output Summary (Last 24 hours) at 1/24/2021 1410  Last data filed at 1/24/2021 1000  Gross per 24 hour   Intake 220 ml   Output 500 ml   Net -280 ml   I/O last 3 completed shifts: In: 36 [P.O.:220; I.V.:600]  Out: 502 [Urine:502]  No data found. Vital Signs:   Blood pressure (!) 115/56, pulse 72, temperature 98.2 °F (36.8 °C), temperature source Oral, resp. rate 18, height 5' 9\" (1.753 m), weight 245 lb (111.1 kg), SpO2 92 %. General appearance:  Alert, responsive, oriented to person, place, and time. Comfortable appearing, no distress. Head:  Normocephalic. No masses, lesions or tenderness. Eyes:  PERRLA. EOMI. Sclera clear. Buccal mucosa moist.  ENT:  Ears normal. Mucosa normal.  Neck:    Supple. Trachea midline. No thyromegaly. No JVD. No bruits. Heart:    Rhythm regular. Rate controlled. Systolic murmur. Lungs:    Midline prior sternotomy incision scar noted. Symmetrical.  Diminished bibasilar. Lungs clear to auscultation bilaterally. No wheezes. No rhonchi. No rales. Abdomen:   Soft. Obese. Non-tender. Non-distended. Bowel sounds positive. No organomegaly or masses. No pain on palpation. Extremities:    Peripheral pulses present, 2+ BUE 1+ BLE. No peripheral edema. No cyanosis. No clubbing. Neurologic:    Alert x 3. No focal deficit. Cranial nerves grossly intact. No focal weakness. Psych:   Behavior is normal. Mood appears normal. Speech is not rapid and/or pressured. Musculoskeletal:   Spine ROM normal. Muscular strength intact. Gait not assessed. Integumentary:  Right foot and dressing with missing second toe, no drainage noted. Tattoos.    Genitalia/Breast:  Deferred    Medication:  Scheduled Meds:   vancomycin  1,250 mg Intravenous Q24H    aspirin  81 mg Oral Daily    clopidogrel  75 mg Oral Daily    insulin glargine  35 Units Subcutaneous BID    Arformoterol Tartrate  15 mcg Nebulization BID    And    budesonide  0.5 mg Nebulization BID    vitamin C 1,000 mg Oral Daily    escitalopram  20 mg Oral Daily    vitamin B-12  1,000 mcg Oral Daily    zinc sulfate  50 mg Oral Daily    traZODone  50 mg Oral Nightly    insulin lispro  0-6 Units Subcutaneous TID WC    insulin lispro  0-3 Units Subcutaneous Nightly    cefepime  2,000 mg Intravenous Q12H    atorvastatin  40 mg Oral Nightly    sacubitril-valsartan  0.5 tablet Oral BID    pantoprazole  40 mg Oral QAM AC    metoprolol succinate  25 mg Oral Daily    gabapentin  300 mg Oral BID    furosemide  20 mg Oral Daily    ferrous sulfate  325 mg Oral Daily with breakfast    sodium chloride flush  10 mL Intravenous 2 times per day    enoxaparin  40 mg Subcutaneous Daily     Continuous Infusions:   sodium chloride Stopped (01/24/21 1220)    dextrose         Objective Data:  CBC with Differential:    Lab Results   Component Value Date    WBC 9.5 01/24/2021    RBC 4.64 01/24/2021    HGB 12.9 01/24/2021    HCT 40.7 01/24/2021     01/24/2021    MCV 87.7 01/24/2021    MCH 27.8 01/24/2021    MCHC 31.7 01/24/2021    RDW 13.2 01/24/2021    SEGSPCT 61 03/15/2014    BANDSPCT 5 06/06/2016    METASPCT 1 06/11/2016    LYMPHOPCT 17.5 01/24/2021    MONOPCT 8.8 01/24/2021    BASOPCT 0.8 01/24/2021    MONOSABS 0.84 01/24/2021    LYMPHSABS 1.66 01/24/2021    EOSABS 0.47 01/24/2021    BASOSABS 0.08 01/24/2021     BMP:    Lab Results   Component Value Date     01/24/2021    K 5.0 01/24/2021    CL 98 01/24/2021    CO2 23 01/24/2021    BUN 33 01/24/2021    LABALBU 3.8 01/24/2021    LABALBU 4.2 06/17/2011    CREATININE 1.3 01/24/2021    CALCIUM 9.4 01/24/2021    GFRAA 51 01/24/2021    LABGLOM 42 01/24/2021    GLUCOSE 220 01/24/2021    GLUCOSE 179 06/17/2011     FLP:    Lab Results   Component Value Date    TRIG 237 01/20/2021    HDL 27 01/20/2021    LDLCALC 53 01/20/2021    LABVLDL 47 01/20/2021       Wound Documentation:   Wound 04/27/15 Other (Comment) Foot Right #1 rt bottom foot aqc 2/27/15 Pressure stage 2: now diabetic non healing wound mosqueda 2 july 21 , 2015 (Active)   Number of days: 2094       Incision 06/05/16 Abdomen (Active)   Number of days: 1690       Wound 01/19/21 Toe (Comment  which one) Anterior;Right 2nd toe posterior (Active)   Wound Length (cm) 1.5 cm 01/19/21 2345   Wound Width (cm) 1.5 cm 01/19/21 2345   Wound Surface Area (cm^2) 2.25 cm^2 01/19/21 2345   Wound Assessment Pink/red; Other (Comment) 01/19/21 2345   Drainage Amount Scant 01/19/21 2345   Drainage Description Thin;Serous 01/19/21 2345   Odor None 01/19/21 2345   Yvonne-wound Assessment Other (Comment) 01/19/21 2345   Number of days: 0       Wound 01/19/21 Toe (Comment  which one) Right 2nd toe anterior (Active)   Wound Length (cm) 1 cm 01/19/21 2345   Wound Width (cm) 1 cm 01/19/21 2345   Wound Surface Area (cm^2) 1 cm^2 01/19/21 2345   Wound Assessment Pink/red; Other (Comment) 01/19/21 2345   Drainage Amount Scant 01/19/21 2345   Drainage Description Serous 01/19/21 2345   Odor None 01/19/21 2345   Yvonne-wound Assessment Other (Comment) 01/19/21 2345   Number of days: 0       Assessment:      · Acute osteomyelitis involving the distal phalanx of the second digit with associated cellulitis in the setting of diabetes with status post amputation of the second toe right foot for acute osteomyelitis by Dr. Sid Lara on January 23  · Severe peripheral vascular disease involving the right lower extremity with status post balloon angioplasty of the right SFA for severe stenosis by Dr. Debo Stevens on January 22  · Chronic kidney disease stage III  · Insulin dependent diabetes mellitus type II, HbA1c 8.4%  · Coronary artery disease status post stent and CABG  · Systolic congestive heart failure EF of 30 to 35% with stage I diastolic dysfunction  · Mitral and tricuspid regurgitation  · Severe apnea on CPAP  · Nonoxygen dependent COPD with chronic respiratory failure  · History of CVA with right-sided deficits and neuropathy  · Hyperlipidemia  · Depression  · Current tobacco abuse    Plan:     · Continue podiatric care and follow-up  · Continue pain medication  · Antibiotic choice per infectious disease  · Continue adding anticoagulant therapy due to PAD  · Discussed behavior modification      More than 50% of my time was spent at the bedside counseling/coordinating care with the patient and/or family with face to face contact. This time was spent reviewing notes and laboratory data as well as instructing and counseling the patient. Time I spent with the family or surrogate(s) is included only if the patient was incapable of providing the necessary information or participating in medical decisions. I also discussed the differential diagnosis and all of the proposed management plans with the patient and individuals accompanying the patient. I am readily available for any further decision-making and intervention.              Yuval Burdick D.O., FACOI  2:10 PM  1/24/2021

## 2021-01-25 VITALS
TEMPERATURE: 97.5 F | DIASTOLIC BLOOD PRESSURE: 67 MMHG | OXYGEN SATURATION: 93 % | BODY MASS INDEX: 36.29 KG/M2 | SYSTOLIC BLOOD PRESSURE: 146 MMHG | RESPIRATION RATE: 16 BRPM | HEIGHT: 69 IN | WEIGHT: 245 LBS | HEART RATE: 75 BPM

## 2021-01-25 LAB
ALBUMIN SERPL-MCNC: 3.2 G/DL (ref 3.5–5.2)
ALP BLD-CCNC: 100 U/L (ref 35–104)
ALT SERPL-CCNC: 11 U/L (ref 0–32)
ANION GAP SERPL CALCULATED.3IONS-SCNC: 9 MMOL/L (ref 7–16)
AST SERPL-CCNC: 9 U/L (ref 0–31)
BASOPHILS ABSOLUTE: 0.08 E9/L (ref 0–0.2)
BASOPHILS RELATIVE PERCENT: 1 % (ref 0–2)
BILIRUB SERPL-MCNC: 0.2 MG/DL (ref 0–1.2)
BLOOD CULTURE, ROUTINE: NORMAL
BUN BLDV-MCNC: 39 MG/DL (ref 6–20)
CALCIUM SERPL-MCNC: 9.4 MG/DL (ref 8.6–10.2)
CHLORIDE BLD-SCNC: 103 MMOL/L (ref 98–107)
CO2: 22 MMOL/L (ref 22–29)
CREAT SERPL-MCNC: 1.3 MG/DL (ref 0.5–1)
CULTURE, BLOOD 2: NORMAL
EOSINOPHILS ABSOLUTE: 0.51 E9/L (ref 0.05–0.5)
EOSINOPHILS RELATIVE PERCENT: 6.1 % (ref 0–6)
GFR AFRICAN AMERICAN: 51
GFR NON-AFRICAN AMERICAN: 42 ML/MIN/1.73
GLUCOSE BLD-MCNC: 151 MG/DL (ref 74–99)
HCT VFR BLD CALC: 39.3 % (ref 34–48)
HEMOGLOBIN: 12.4 G/DL (ref 11.5–15.5)
IMMATURE GRANULOCYTES #: 0.02 E9/L
IMMATURE GRANULOCYTES %: 0.2 % (ref 0–5)
LYMPHOCYTES ABSOLUTE: 1.84 E9/L (ref 1.5–4)
LYMPHOCYTES RELATIVE PERCENT: 22.1 % (ref 20–42)
MCH RBC QN AUTO: 27.9 PG (ref 26–35)
MCHC RBC AUTO-ENTMCNC: 31.6 % (ref 32–34.5)
MCV RBC AUTO: 88.3 FL (ref 80–99.9)
METER GLUCOSE: 165 MG/DL (ref 74–99)
METER GLUCOSE: 205 MG/DL (ref 74–99)
MONOCYTES ABSOLUTE: 0.83 E9/L (ref 0.1–0.95)
MONOCYTES RELATIVE PERCENT: 10 % (ref 2–12)
NEUTROPHILS ABSOLUTE: 5.06 E9/L (ref 1.8–7.3)
NEUTROPHILS RELATIVE PERCENT: 60.6 % (ref 43–80)
PDW BLD-RTO: 13.5 FL (ref 11.5–15)
PLATELET # BLD: 345 E9/L (ref 130–450)
PMV BLD AUTO: 9.8 FL (ref 7–12)
POTASSIUM SERPL-SCNC: 4.9 MMOL/L (ref 3.5–5)
RBC # BLD: 4.45 E12/L (ref 3.5–5.5)
SODIUM BLD-SCNC: 134 MMOL/L (ref 132–146)
TOTAL PROTEIN: 7.1 G/DL (ref 6.4–8.3)
WBC # BLD: 8.3 E9/L (ref 4.5–11.5)

## 2021-01-25 PROCEDURE — 94640 AIRWAY INHALATION TREATMENT: CPT

## 2021-01-25 PROCEDURE — 6370000000 HC RX 637 (ALT 250 FOR IP): Performed by: PODIATRIST

## 2021-01-25 PROCEDURE — 76937 US GUIDE VASCULAR ACCESS: CPT

## 2021-01-25 PROCEDURE — 2580000003 HC RX 258: Performed by: CLINICAL NURSE SPECIALIST

## 2021-01-25 PROCEDURE — C1751 CATH, INF, PER/CENT/MIDLINE: HCPCS

## 2021-01-25 PROCEDURE — 82962 GLUCOSE BLOOD TEST: CPT

## 2021-01-25 PROCEDURE — 36415 COLL VENOUS BLD VENIPUNCTURE: CPT

## 2021-01-25 PROCEDURE — 6370000000 HC RX 637 (ALT 250 FOR IP): Performed by: THORACIC SURGERY (CARDIOTHORACIC VASCULAR SURGERY)

## 2021-01-25 PROCEDURE — 85025 COMPLETE CBC W/AUTO DIFF WBC: CPT

## 2021-01-25 PROCEDURE — 02HV33Z INSERTION OF INFUSION DEVICE INTO SUPERIOR VENA CAVA, PERCUTANEOUS APPROACH: ICD-10-PCS | Performed by: INTERNAL MEDICINE

## 2021-01-25 PROCEDURE — 80053 COMPREHEN METABOLIC PANEL: CPT

## 2021-01-25 PROCEDURE — 36569 INSJ PICC 5 YR+ W/O IMAGING: CPT

## 2021-01-25 PROCEDURE — 6360000002 HC RX W HCPCS: Performed by: CLINICAL NURSE SPECIALIST

## 2021-01-25 PROCEDURE — 2720000010 HC SURG SUPPLY STERILE

## 2021-01-25 PROCEDURE — 6360000002 HC RX W HCPCS: Performed by: THORACIC SURGERY (CARDIOTHORACIC VASCULAR SURGERY)

## 2021-01-25 PROCEDURE — 6370000000 HC RX 637 (ALT 250 FOR IP): Performed by: INTERNAL MEDICINE

## 2021-01-25 RX ORDER — HYDROCODONE BITARTRATE AND ACETAMINOPHEN 5; 325 MG/1; MG/1
1 TABLET ORAL EVERY 6 HOURS PRN
Qty: 12 TABLET | Refills: 0 | Status: SHIPPED | OUTPATIENT
Start: 2021-01-25 | End: 2021-01-28

## 2021-01-25 RX ADMIN — GABAPENTIN 300 MG: 300 CAPSULE ORAL at 09:04

## 2021-01-25 RX ADMIN — METFORMIN HYDROCHLORIDE 1000 MG: 1000 TABLET, FILM COATED ORAL at 08:08

## 2021-01-25 RX ADMIN — PANTOPRAZOLE SODIUM 40 MG: 40 TABLET, DELAYED RELEASE ORAL at 05:07

## 2021-01-25 RX ADMIN — FUROSEMIDE 20 MG: 20 TABLET ORAL at 09:04

## 2021-01-25 RX ADMIN — INSULIN LISPRO 2 UNITS: 100 INJECTION, SOLUTION INTRAVENOUS; SUBCUTANEOUS at 08:08

## 2021-01-25 RX ADMIN — BUDESONIDE 500 MCG: 0.5 INHALANT RESPIRATORY (INHALATION) at 06:31

## 2021-01-25 RX ADMIN — DAPTOMYCIN 500 MG: 500 INJECTION, POWDER, LYOPHILIZED, FOR SOLUTION INTRAVENOUS at 13:53

## 2021-01-25 RX ADMIN — METOPROLOL SUCCINATE 25 MG: 25 TABLET, EXTENDED RELEASE ORAL at 09:06

## 2021-01-25 RX ADMIN — CLOPIDOGREL BISULFATE 75 MG: 75 TABLET ORAL at 09:03

## 2021-01-25 RX ADMIN — INSULIN GLARGINE 35 UNITS: 100 INJECTION, SOLUTION SUBCUTANEOUS at 09:12

## 2021-01-25 RX ADMIN — ESCITALOPRAM OXALATE 20 MG: 10 TABLET ORAL at 09:03

## 2021-01-25 RX ADMIN — CYANOCOBALAMIN TAB 1000 MCG 1000 MCG: 1000 TAB at 09:08

## 2021-01-25 RX ADMIN — ASPIRIN 81 MG: 81 TABLET, COATED ORAL at 09:03

## 2021-01-25 RX ADMIN — INSULIN LISPRO 4 UNITS: 100 INJECTION, SOLUTION INTRAVENOUS; SUBCUTANEOUS at 13:40

## 2021-01-25 RX ADMIN — OXYCODONE HYDROCHLORIDE AND ACETAMINOPHEN 1000 MG: 500 TABLET ORAL at 09:02

## 2021-01-25 RX ADMIN — Medication 50 MG: at 09:08

## 2021-01-25 RX ADMIN — FERROUS SULFATE TAB 325 MG (65 MG ELEMENTAL FE) 325 MG: 325 (65 FE) TAB at 08:07

## 2021-01-25 RX ADMIN — ARFORMOTEROL TARTRATE 15 MCG: 15 SOLUTION RESPIRATORY (INHALATION) at 06:31

## 2021-01-25 RX ADMIN — SACUBITRIL AND VALSARTAN 0.5 TABLET: 24; 26 TABLET, FILM COATED ORAL at 09:08

## 2021-01-25 NOTE — PROGRESS NOTES
Pharmacy Consultation Note  (Antibiotic Dosing and Monitoring)    Initial consult date: 1/20/2021  Consulting physician: Dr. Villatoro Mean  Drug(s): Vancomycin  Indication: Diabetic foot infection    Ht Readings from Last 1 Encounters:   01/19/21 5' 9\" (1.753 m)     Wt Readings from Last 1 Encounters:   01/19/21 245 lb (111.1 kg)     Age/  Gender IBW DW  Allergy Information   63 y.o.   female 66.2 kg 111.1 kg  Ace inhibitors and Percocet [oxycodone-acetaminophen]          Date  Tmax WBC BUN/CR UOP  (mL/kg/hr) Drug/Dose Time   Given Level(s)   (Time) Comments   1/19  (#1) afebrile 14.1 32/1.5 -- Vancomycin 2000 mg IV x 1 2225 1/20  (#2) afebrile 8.8 31/1.4 -- Vancomycin 1750 mg IV q24hr 2222 1/21  (#3) afebrile 6.6 27/1.3 -- Vancomycin 1750 mg IV q24hr 2300     1/22  (#4) afebrile 8.9 28/1.2 -- Vancomycin 1750 mg IV q24hr 2130  Dose hung an hour early and prior to the scheduled trough   1/23  (#5) afebrile 7.7 31/1.4 -- Vancomycin 1750 mg IV q24hr  Trough @ 2340 = 24.7 mcg/mL Dose held due to supratherapeutic trough   1/24  (#6) afebrile 9.5 33/1.3 -- Vancomycin 1250 mg IV q24hr 1225     1/25  (#7) afebrile 8.3 39/1.3 -- Vancomycin 1250 mg IV q24hr <1230>       Estimated Creatinine Clearance: 64 mL/min (A) (based on SCr of 1.3 mg/dL (H)). UOP over the past 24 hours:       Intake/Output Summary (Last 24 hours) at 1/25/2021 1242  Last data filed at 1/25/2021 0854  Gross per 24 hour   Intake 360 ml   Output    Net 360 ml     Other anti-infectives: Anti-infective Dose Date Initiated Date Stopped   Pip/tazo 3.375g IV q8hr 1/20 1/20   Cefepime 2g IV q12hr 1/20      Cultures:  available culture and sensitivity results were reviewed in EPIC  Cultures sent and are pending.   Culture Date Result    Blood cx 1 1/19 NGTD   Blood cx 2 1/19 NGTD   Wound cx  (R foot) 1/20 MRSA   MRSA nares 1/20 Not isolated     Assessment:  · Consulted by Dr. Irving Claire to dose/monitor vancomycin  · Goal trough level:  15-20 mcg/mL  · Pt is a 55 yoF who presented from home with R diabetic foot infection and cellulitis. MRI ordered to rule out osteomyelitis. · Serum creatinine today: 1.3 mg/dL; CrCl ~ 64 mL/min; baseline Scr unknown (was 0.8 - 1 in 2016).     Plan:  · Vancomycin 1250 mg IV q24hr  · Repeat trough prior to fourth dose of new regimen  · Nursing communication order to ensure trough drawn prior to next dose  · Follow renal function  · Pharmacist will follow and monitor/adjust dosing as necessary      Thank you for the consult,    [place], PharmD, BCPS 1/25/2021 12:43 PM   Ext: [phone]

## 2021-01-25 NOTE — PROGRESS NOTES
Podiatry Resident Progress Note    SUBJECTIVE:   Patient was seen at bedside for follow up of right foot second digit amputation (DOS 1/23). No overnight events noted. States she is being discharged today and wishes to go home. States she does not have any pain in her operative foot. She states she sometimes has difficulty balancing, but she has a cane and walker at home which she uses. She states she will do her best to stay off her right foot. Patient denies fever, chills, nausea, vomiting, shortness of breath, and chest pain.     Past Medical History:   Diagnosis Date    Anxiety     CAD (coronary artery disease)     x5 stents    [facility]     Cerebral artery occlusion with cerebral infarction [facility]     COPD (chronic obstructive pulmonary disease) ([facility]     Depression     Diabetes mellitus ([facility]     IDDM    Hx of cardiovascular stress test 3/22/2016    Lexiscan    Hyperlipidemia     Nonrheumatic mitral (valve) insufficiency     Nonrheumatic tricuspid (valve) insufficiency     Sleep apnea     cpap    Smoker     current 1PPD X35yrs        Past Surgical History:   Procedure Laterality Date    ABDOMEN SURGERY      ABDOMINAL EXPLORATION SURGERY N/A 6-5-16    Excision perforated mid body gastric ulcer, primary closure omentum patch    BACK SURGERY      CARDIAC CATHETERIZATION  05/03/2018    Dr. Chantelle Graham CATH LAB PROCEDURE  05/03/2018    Dr. Venkatesh Chao multiple PCIs     KNEE ARTHROPLASTY      TRANSESOPHAGEAL ECHOCARDIOGRAM  10/28/2016    Dr. Venkatesh Chao r/o embolic source         Family History   Problem Relation Age of Onset    Heart Attack Father         Social History     Tobacco Use    Smoking status: Current Every Day Smoker     Packs/day: 1.00    Smokeless tobacco: Never Used   Substance Use Topics    Alcohol use: No     Comment: 2 cups of coffee a day Prior to Admission medications    Medication Sig Start Date End Date Taking? Authorizing Provider   HYDROcodone-acetaminophen (NORCO) 5-325 MG per tablet Take 1 tablet by mouth every 6 hours as needed for Pain for up to 3 days. 1/25/21 1/28/21 Yes DAVID Grullon - CNP   daptomycin (CUBICIN) infusion Infuse 505.2 mg intravenously every 24 hours for 28 days Compound per protocol.  1/24/21 2/21/21 Yes Ida Moreira MD   canagliflozin (INVOKANA) 300 MG TABS tablet Take 300 mg by mouth every morning (before breakfast)   Yes Historical Provider, MD   Biotin 28128 MCG TABS Take by mouth   Yes Historical Provider, MD   zinc 50 MG TABS tablet Take 50 mg by mouth daily   Yes Historical Provider, MD   vitamin B-12 (CYANOCOBALAMIN) 1000 MCG tablet Take 1,000 mcg by mouth daily   Yes Historical Provider, MD   Turmeric (QC TUMERIC COMPLEX) 500 MG CAPS Take by mouth   Yes Historical Provider, MD   Ascorbic Acid (VITAMIN C) 1000 MG tablet Take 1,000 mg by mouth daily   Yes Historical Provider, MD   Fluticasone Propionate (FLONASE ALLERGY RELIEF NA) by Nasal route   Yes Historical Provider, MD   metoprolol succinate (TOPROL XL) 25 MG extended release tablet Take 1 tablet by mouth daily 9/25/20  Yes Nando Serna MD   furosemide (LASIX) 20 MG tablet Take 1 tablet by mouth daily 9/25/20  Yes aNndo Serna MD   sacubitril-valsartan (ENTRESTO) 24-26 MG per tablet Take 0.5 tablets by mouth 2 times daily Take a half of a tablet BID 4/2/20  Yes Nando Serna MD   Cholecalciferol (VITAMIN D) 125 MCG (5000 UT) CAPS Take 5,000 Units by mouth   Yes Historical Provider, MD   insulin glargine (BASAGLAR KWIKPEN) 100 UNIT/ML injection pen Inject 26 Units into the skin 2 times daily 26 am   30 units in pm   Yes Historical Provider, MD   acetaminophen (TYLENOL) 325 MG tablet Take 650 mg by mouth 2 times daily as needed for Pain   Yes Historical Provider, MD   pantoprazole (PROTONIX) 40 MG tablet  6/7/17  Yes Historical Provider, MD atorvastatin (LIPITOR) 40 MG tablet  6/7/17  Yes Historical Provider, MD   escitalopram (LEXAPRO) 20 MG tablet  6/7/17  Yes Historical Provider, MD   ferrous sulfate 325 (65 Fe) MG tablet  6/7/17  Yes Historical Provider, MD   traZODone (DESYREL) 50 MG tablet Take 50 mg by mouth nightly   Yes Historical Provider, MD   gabapentin (NEURONTIN) 300 MG capsule Take 300 mg by mouth 2 times daily. .   Yes Historical Provider, MD   clopidogrel (PLAVIX) 75 MG tablet Take 75 mg by mouth daily   Yes Historical Provider, MD   aspirin 81 MG EC tablet Take 1 tablet by mouth daily 10/28/16  Yes Leonard Ruiz MD   metFORMIN (GLUCOPHAGE) 1000 MG tablet Take 1 tablet by mouth 2 times daily (with meals) 10/28/16  Yes Leonard Ruiz MD   BREO ELLIPTA 200-25 MCG/INH AEPB  6/9/17   Historical Provider, MD   albuterol sulfate  (90 BASE) MCG/ACT inhaler Inhale 2 puffs into the lungs every 6 hours as needed for Wheezing    Historical Provider, MD      Ace inhibitors and Percocet [oxycodone-acetaminophen]       OBJECTIVE:      VITALS:  Vitals:    01/25/21 0615   BP: 124/60   Pulse: 67   Resp: 16   Temp: 97.5 °F (36.4 °C)   SpO2: 94%        LABS:   Recent Labs     01/23/21  0505 01/24/21  0953 01/25/21  0445   WBC 7.7 9.5 8.3   HGB 13.2 12.9 12.4   HCT 41.7 40.7 39.3    387 345       PHYSICAL EXAM:  Vascular:    DP / PT pulses nonpalpable bilaterally. CFT mildly sluggish to digits bilat. Warm to cool temp gradient along right LE. Pedal hair absent. Neurologic:    Protective sensation is decreased in distal lower extremity bilaterally - previously verified using [place] touch test.    Dermatologic:    Right second digit amputation site is stable without dehiscence, drainage or active bleeding. No strike through today. Sutures are intact and in intended position. Skin edges acceptably coapted. No further SOI about the surgical site. Slight distal leg erythema circumferentially.     Musculoskeletal:   No pain to walking with full WB to forefoot within 1 day of operation. Post-op surgical shoe noted in room. -Dressings: Betadine, Xeroform, DSD. Changes QOD.  -FU with Dr. Kimberly Duarte in clinic with 1 week of DC for further care.  -Okay for DC from foot and ankle POV.   -Will continue to follow while in hospital.    DW: Jenny Frias DPM FACFAS  Fellowship-Trained Foot and Ankle Surgeon  Diplomate, American Board of Foot and Ankle Surgeons  [phone]

## 2021-01-25 NOTE — DISCHARGE SUMMARY
Internal Medicine Progress Note     [facility]     Obie Sarah. Kyle Bond., DEONNA.A.CNoeOMARLON Zavala D.O., BRENNONOKATRIN Mckeon, MSN, APRN, NP-C  Cristino Lozada. Anahy Babin, MSN, APRN-CNP       Internal Medicine  Discharge Summary    NAME: Bhaskar Olmos  :  1965  MRN:  [id number]  Jhoana Olmos MD  ADMITTED: 2021      DISCHARGED: 21    ADMITTING PHYSICIAN: Obie Umana DO    CONSULTANT(S):   IP CONSULT TO PODIATRY  IP CONSULT TO PODIATRY  IP CONSULT TO INFECTIOUS DISEASES  PHARMACY TO DOSE VANCOMYCIN  IP CONSULT TO CARDIOLOGY  IP CONSULT TO VASCULAR SURGERY  IP CONSULT TO VASCULAR SURGERY     ADMITTING DIAGNOSIS:   Cellulitis of foot [L03.119]     DISCHARGE DIAGNOSES:   · Acute osteomyelitis involving the distal phalanx of the second digit with associated cellulitis in the setting of diabetes with status post amputation of the second toe right foot for acute osteomyelitis by Dr. Mari Hebert on   · Severe peripheral vascular disease involving the right lower extremity with status post balloon angioplasty of the right SFA for severe stenosis by Dr. Elle Aranda on   · Chronic kidney disease stage III  · Insulin dependent diabetes mellitus type II, HbA1c 8.4%  · Coronary artery disease status post stent and CABG  · Systolic congestive heart failure EF of 30 to 35% with stage I diastolic dysfunction  · Mitral and tricuspid regurgitation  · Severe apnea on CPAP  · Nonoxygen dependent COPD with chronic respiratory failure  · History of CVA with right-sided deficits and neuropathy  · Hyperlipidemia  · Depression  · Current tobacco abuse    BRIEF HISTORY OF PRESENT ILLNESS:   Patient is 26-year-old female presented to the ED due to right foot diabetic ulcer and cellulitis. She states it developed within the last week.   She was placed on antibiotics however it progressed and she was sent to the ED by her podiatrist for IV antibiotics and possible debridement. Patient states that she normally walks on her toes and she has a. Peripheral neuropathy on the right side. She states she first noticed blister about 4 to 5 days ago. From there erythema and swelling has progressed to above her ankle. She denies any systemic symptoms. LABS[de-identified]  Lab Results   Component Value Date    WBC 8.3 01/25/2021    HGB 12.4 01/25/2021    HCT 39.3 01/25/2021     01/25/2021     01/25/2021    K 4.9 01/25/2021     01/25/2021    CREATININE 1.3 (H) 01/25/2021    BUN 39 (H) 01/25/2021    CO2 22 01/25/2021    GLUCOSE 151 (H) 01/25/2021    ALT 11 01/25/2021    AST 9 01/25/2021    INR 1.0 04/30/2018     Lab Results   Component Value Date    INR 1.0 04/30/2018    INR 1.2 10/25/2016    INR 1.1 04/13/2016    PROTIME 11.0 04/30/2018    PROTIME 12.2 10/25/2016    PROTIME 11.6 04/13/2016      Lab Results   Component Value Date    TSH 2.680 01/20/2021     Lab Results   Component Value Date    TRIG 237 (H) 01/20/2021    TRIG 111 10/25/2016    TRIG 178 (H) 06/06/2016     Lab Results   Component Value Date    HDL 27 01/20/2021    HDL 32 10/25/2016    HDL 26 06/06/2016     Lab Results   Component Value Date    LDLCALC 53 01/20/2021    LDLCALC 97 10/25/2016    LDLCALC 40 06/06/2016     Lab Results   Component Value Date    LABA1C 8.4 (H) 01/20/2021       IMAGING:  Xr Foot Right (min 3 Views)    Result Date: 1/19/2021  EXAMINATION: THREE XRAY VIEWS OF THE RIGHT FOOT 1/19/2021 9:57 pm COMPARISON: Right foot series from December 17, 2008 HISTORY: ORDERING SYSTEM PROVIDED HISTORY: pain TECHNOLOGIST PROVIDED HISTORY: Reason for exam:->pain FINDINGS: Radiographs of the right foot demonstrate no fractures with preserved alignment. Mild degenerative spurring of the right large toe metatarsal phalangeal joint. Prominent anterior and posterior calcaneal enthesophytes. No erosive changes. Osseous mineralization is normal.  No soft tissue abnormality. Arteriosclerosis present. There appears to be some soft tissue irregularity of the dorsal and palmar aspect of the forefoot on the lateral view. 1.  No acute osseous findings about the right foot. No erosive changes seen. There appears to be some soft tissue irregularity to the dorsal and palmar aspect of the forefoot on the lateral view. 2. Mild right large toe osteoarthritis. Calcaneal enthesophytes. 3.  Arteriosclerosis. Ir Angiogram Extremity Right    Result Date: 1/22/2021  Radiology exam is complete. No Radiologist dictation. Please follow up with ordering provider. Vl Lower Extremity Arterial Segmental Pressures W Ppg Bilateral    Result Date: 1/20/2021  EXAMINATION: ARTERIAL DUPLEX ULTRASOUND OF THE BILATERAL LOWER EXTREMITIES WITH SEGMENTAL PRESSURES AND PULSE VOLUME RECORDINGS 1/20/2021 7:10 am TECHNIQUE: Arterial duplex MIGUEL ultrasound. Segmental pressures and PVR performed with Doppler. COMPARISON: None. HISTORY: ORDERING SYSTEM PROVIDED HISTORY: Diabetic foot ulceration of the right foot second digit with concern for critical arterial stenosis TECHNOLOGIST PROVIDED HISTORY: Reason for exam:->Diabetic foot ulceration of the right foot second digit with concern for critical arterial stenosis What reading provider will be dictating this exam?->CRC FINDINGS: The MIGUEL on the right is 0.67. The MIGUEL on the left is 1.19. Indices for the right lower extremity: Low thigh 0.95, calf 0.78, ankle (posterior tibial) 0.67, ankle (dorsalis pedis) 0.64, 1st digit 0.41, 2nd digit 0.33 and 3rd digit 0.23. Pressure measurements were unable to be obtained for the 4th or 5th digits due to digit size unable to accommodate cuff. Waveforms are detected throughout all arteries, including the 4th and 5th digits. Indices for the left lower extremity:  Low thigh 1.21, calf 1.06, ankle (posterior tibial) 1.19, ankle (dorsalis pedis) 0.95, 1st digit 0.62, 2nd digit 0.67 and 3rd digit 0.79.   Pressure measurements were unable to be obtained for the 4th or 5th digits due to digits size unable to accommodate cuff. Waveforms are detected throughout all arteries, including the 4th and 5th digits. 1. MIGUEL on the right is 0.67, which is abnormal, suggesting severe-moderate peripheral arterial disease. 2. MIGUEL on the left is 1.19 (normal). Mri Foot Right Wo Contrast    Result Date: 1/20/2021  EXAMINATION: MRI OF THE RIGHT FOOT WITHOUT CONTRAST, 1/20/2021 11:45 am TECHNIQUE: Multiplanar multisequence MRI of the right foot was performed without the administration of intravenous contrast. COMPARISON: 01/19/2021 HISTORY: ORDERING SYSTEM PROVIDED HISTORY: Diabetic foot ulceration of the right foot and second digit with concern for osteomyelitis TECHNOLOGIST PROVIDED HISTORY: Reason for exam:->Diabetic foot ulceration of the right foot and second digit with concern for osteomyelitis FINDINGS: LISFRANC JOINT: Lisfranc ligament is intact. Tarsometatarsal joint alignment is maintained. BONE MARROW: Marrow edema and low T1 signal are present diffusely involving the distal phalanx of the 2nd digit. Minimal marrow edema without T1 signal changes present in the middle phalanx 2nd digit predominantly involving the distal half of the middle phalanx. Bone marrow signal in the proximal phalanx 2nd digit is normal. Bone marrow signal in the remainder of the digits is normal.  Bone marrow signal in the metatarsals demonstrates no significant abnormality. An incidental cyst is present in the 3rd metatarsal head. GREATER AND LESSER MTP JOINTS: No MTP joint effusion or synovitis. Plantar plate structures are intact. SOFT TISSUES: Ulcer at the distal tip of the 2nd digit. Surrounding soft tissue edema and cellulitis in the 2nd digit extending to the level of the PIP joint. No soft tissue abscess. No additional ulcer is identified.  Diffuse muscle atrophy in the foot involving the intermetatarsal musculature with relative sparing of superficial musculature. TENDONS: No tenosynovitis or tendon tear. 1. Acute osteomyelitis throughout the distal phalanx 2nd digit. 2. Mild marrow edema without T1 signal change in the middle phalanx 2nd digit. Findings could be reactive or due to early osteomyelitis. 3. Ulcer in the distal 2nd digit with cellulitis. No abscess or tenosynovitis. HOSPITAL COURSE:   Katherine Macdonald spent an extended period of time hospitalized this will serve as a brief synopsis. Initially presenting at the recommendations of her podiatrist for admission and IV antibiotics due to an infection of her right foot she was found to have osteomyelitis of the second digit that required podiatric intervention. During our examination we found evidence of decreased arterial flow and ultrasound was formed and confirmed this. Vascular consultation was obtained and she underwent revascularization followed by amputation. Infectious disease provided consultation and anti-infectives were ultimately determined by their service. Symptoms remain well managed. The patient's diabetes control remained adequate during hospitalization and hemoglobin A1c of 8.4% that suggested additional modifications regarding diet and lifestyle could be made as well as medication adjustments which will be deferred to the primary at follow-up. Overall she improved to the point where she can be discharged home with outpatient follow-up with primary care, podiatry and infectious disease. BRIEF PHYSICAL EXAMINATION AND LABORATORIES ON DAY OF DISCHARGE:  VITALS:  /60   Pulse 67   Temp 97.5 °F (36.4 °C) (Oral)   Resp 16   Ht 5' 9\" (1.753 m)   Wt 245 lb (111.1 kg)   SpO2 94%   BMI 36.18 kg/m²     HEENT:  PERRLA. EOMI. Sclera clear. Buccal mucosa moist.    Neck:  Supple. Trachea midline. No thyromegaly. No JVD. No bruits. Heart:  Rhythm regular, rate controlled. Systolic murmur. .    Lungs:  Symmetrical.  Bibasilar. Clear to auscultation bilaterally.   No wheezes. No rhonchi. No rales. Abdomen: Soft. Obese contour. Non-tender. Non-distended. Bowel sounds positive. No organomegaly or masses. No pain on palpation    Extremities:  Peripheral pulses present. Unremarkable postoperative right lower extremity. Intact distal neurovascular examination in all 4 symptoms otherwise. No peripheral edema. No ulcers. Neurologic:  Alert x 3. No focal deficit. Cranial nerves grossly intact. Skin:  No petechia. No hemorrhage. No wounds. DISPOSITION:  The patient's condition is good. At this time the patient is without objective evidence of an acute process requiring continuing hospitalization or inpatient management. They are stable for discharge with outpatient follow-up. I have spoken with the patient and discussed the results of the current hospitalization, in addition to providing specific details for the plan of care and counseling regarding the diagnosis and prognosis. The plan has been discussed in detail and they are aware of the specific conditions for emergent return, as well as the importance of follow-up.   Their questions are answered at this time and they are agreeable with the plan for discharge to home    DISCHARGE MEDICATIONS:    Regine Warner   Home Medication Instructions ELY:[id number]    Printed on:01/25/21 0797   Medication Information                      acetaminophen (TYLENOL) 325 MG tablet  Take 650 mg by mouth 2 times daily as needed for Pain             albuterol sulfate  (90 BASE) MCG/ACT inhaler  Inhale 2 puffs into the lungs every 6 hours as needed for Wheezing             Ascorbic Acid (VITAMIN C) 1000 MG tablet  Take 1,000 mg by mouth daily             aspirin 81 MG EC tablet  Take 1 tablet by mouth daily             atorvastatin (LIPITOR) 40 MG tablet               Biotin 21817 MCG TABS  Take by mouth             BREO ELLIPTA 200-25 MCG/INH AEPB               canagliflozin (INVOKANA) 300 MG TABS tablet  Take 300 mg by mouth every morning (before breakfast)             Cholecalciferol (VITAMIN D) 125 MCG (5000 UT) CAPS  Take 5,000 Units by mouth             clopidogrel (PLAVIX) 75 MG tablet  Take 75 mg by mouth daily             daptomycin (CUBICIN) infusion  Infuse 505.2 mg intravenously every 24 hours for 28 days Compound per protocol. escitalopram (LEXAPRO) 20 MG tablet               ferrous sulfate 325 (65 Fe) MG tablet               Fluticasone Propionate (FLONASE ALLERGY RELIEF NA)  by Nasal route             furosemide (LASIX) 20 MG tablet  Take 1 tablet by mouth daily             gabapentin (NEURONTIN) 300 MG capsule  Take 300 mg by mouth 2 times daily. Margaret Roderick HYDROcodone-acetaminophen (NORCO) 5-325 MG per tablet  Take 1 tablet by mouth every 6 hours as needed for Pain for up to 3 days. insulin glargine (BASAGLAR KWIKPEN) 100 UNIT/ML injection pen  Inject 26 Units into the skin 2 times daily 26 am   30 units in pm             metFORMIN (GLUCOPHAGE) 1000 MG tablet  Take 1 tablet by mouth 2 times daily (with meals)             metoprolol succinate (TOPROL XL) 25 MG extended release tablet  Take 1 tablet by mouth daily             pantoprazole (PROTONIX) 40 MG tablet               sacubitril-valsartan (ENTRESTO) 24-26 MG per tablet  Take 0.5 tablets by mouth 2 times daily Take a half of a tablet BID             traZODone (DESYREL) 50 MG tablet  Take 50 mg by mouth nightly             Turmeric (QC TUMERIC COMPLEX) 500 MG CAPS  Take by mouth             vitamin B-12 (CYANOCOBALAMIN) 1000 MCG tablet  Take 1,000 mcg by mouth daily             zinc 50 MG TABS tablet  Take 50 mg by mouth daily                 FOLLOW UP/INSTRUCTIONS:  · This patient is instructed to follow-up with her primary care physician. · Patient is instructed to follow-up with the consults listed above as directed by them.   · she is instructed to resume home medications and take new medications as indicated in the list above.  · If the patient has a recurrence of symptoms, she is instructed to go to the ED. Preparing for this patient's discharge, including paperwork, orders, instructions, and meeting with patient did require > 40 minutes.     DAVID Crane CNP     1/25/2021  7:49 AM

## 2021-01-25 NOTE — PROGRESS NOTES
[facility]  [facility]  Progress Note    NAME: Nii Cabral  MR:  [id number]  :   1965  DATE OF SERVICE:21    This is a face to face encounter with Nii Cabral 63 y.o. female on 21  ID following for   Chief Complaint   Patient presents with    Wound Check     Wound check to the right foot. Patient told to come to the ER by Dr Angie Jarvis. SUBJECTIVE:  Sitting up in bed- family present   S/P ANGIO  S/p amp 2nd toe   NO F/C/N/V/D  FOOT DRESSED    Patient upset that she has to stay for dose of Daptomycin prior to discharge     Patient is tolerating medications. No reported adverse drug reactions. Review of systems:  As stated above in the chief complaint, otherwise negative.     Medications:  Scheduled Meds:   daptomycin (CUBICIN) IVPB  6 mg/kg (Adjusted) Intravenous Once    insulin lispro  0-12 Units Subcutaneous TID WC    insulin lispro  0-6 Units Subcutaneous Nightly    metFORMIN  1,000 mg Oral BID WC    lidocaine PF  5 mL Intradermal Once    heparin flush  3 mL Intravenous 2 times per day    aspirin  81 mg Oral Daily    clopidogrel  75 mg Oral Daily    insulin glargine  35 Units Subcutaneous BID    Arformoterol Tartrate  15 mcg Nebulization BID    And    budesonide  0.5 mg Nebulization BID    vitamin C  1,000 mg Oral Daily    escitalopram  20 mg Oral Daily    vitamin B-12  1,000 mcg Oral Daily    zinc sulfate  50 mg Oral Daily    traZODone  50 mg Oral Nightly    atorvastatin  40 mg Oral Nightly    sacubitril-valsartan  0.5 tablet Oral BID    pantoprazole  40 mg Oral QAM AC    metoprolol succinate  25 mg Oral Daily    gabapentin  300 mg Oral BID    furosemide  20 mg Oral Daily    ferrous sulfate  325 mg Oral Daily with breakfast    sodium chloride flush  10 mL Intravenous 2 times per day    enoxaparin  40 mg Subcutaneous Daily     Continuous Infusions:   dextrose       PRN Meds:sodium chloride flush, heparin flush, HYDROcodone 5 mg - 06/12/2016    ORG Staphylococcus aureus 01/20/2021    ORG Staphylococcus aureus 06/24/2016    ORG Sveta glabrata 06/05/2016    ORG Candida albicans 06/05/2016    ORG Candida species 06/05/2016     WOUND/ABSCESS   Date Value Ref Range Status   01/20/2021   Final    Light growth  Methicillin resistant Staph aureus isolated. Most Methicillin  resistant Staphylococcus are usually resistant to multiple  antibiotics including other B-Lactams, Aminoglycosides,  Macrolides, Clindamycin and Tetracycline. Contact isolation  is indicated. 06/24/2016   Final    Heavy growth  Methicillin resistant Staph aureus isolated. Most Methicillin  resistant Staphylococcus are usually resistant to multiple  antibiotics including other B-Lactams, Aminoglycosides,  Macrolides, Clindamycin and Tetracycline. Contact isolation  is indicated. 07/28/2015 Moderate growth  Final   07/28/2015   Final    Moderate growth  Methicillin resistant Staph aureus isolated. Most Methicillin  resistant Staphylococcus are usually resistant to multiple  antibiotics including other B-Lactams, Aminoglycosides,  Macrolides, Clindamycin and Tetracycline. Contact isolation  is indicated. This isolate is presumed to be resistant based on the  detection of inducible Clindamycin resistance. Clindamycin  may still be effective in some patients. MRSA Culture Only   Date Value Ref Range Status   01/20/2021 Methicillin resistant Staph aureus not isolated  Final      ASSESSMENT:  · Leukocytosis  · Cellulitis right foot MRSA  · Dfu/infection right second toe  S/p amp second toe wound open 1/23  · PAD  s/p Angiogram and revascularization     Plan:   ·  stop vancomycin  · Start Daptomycin 6 mg/kg IV x1 today prior to discharge   · Med rec done for 4 weeks of daptomcyin    · final cx MRSA  · Monitor labs  · Can d/c from ID POV     Imaging and labs were reviewed per medical records.    The patient was educated about the diagnosis, prognosis, indications, risks

## 2021-01-25 NOTE — PROCEDURES
Power picc line Placement 1/25/2021    Product number:[id number]   Lot Number: [id number]      Ultrasound: YES   Left Brachial vein:                Upper Arm Circumference: 31.5CM    Size: 4FR    Exposed Length: 5CM    Internal Length:41CM   Cut: 4CM   Vein Measurement: 0.50CM    INSERTED SL POWER PICC LINE WITH VPS TIP CONFIRMATION SYSTEM TIP IN LOWER 1/3SVC/CACLIFTON HOPKINS OBTAINED.      eL Oliveros  1/25/2021  11:25 AM

## 2021-01-25 NOTE — CARE COORDINATION
WALKER note: Spoke with patient re:IV antibiotics, patient prefers home care. She did not have a preference however she had grand-daughter call her daughter and daughter requested [facility]. Referral made. Also gave patient option of infusion center daily if dapto copay is too high.

## 2021-01-25 NOTE — CARE COORDINATION
CM note: Patient was ok with cost of home IVs after speaking with [facility], however dose has since been changed to weight based dose. [facility] will run new dose and check cost to see if any difference.

## 2021-02-25 ENCOUNTER — OFFICE VISIT (OUTPATIENT)
Dept: CARDIOLOGY CLINIC | Age: 56
End: 2021-02-25
Payer: MEDICARE

## 2021-02-25 VITALS
HEART RATE: 68 BPM | DIASTOLIC BLOOD PRESSURE: 46 MMHG | WEIGHT: 244 LBS | HEIGHT: 69 IN | BODY MASS INDEX: 36.14 KG/M2 | SYSTOLIC BLOOD PRESSURE: 94 MMHG

## 2021-02-25 DIAGNOSIS — I25.10 CORONARY ARTERY DISEASE INVOLVING NATIVE CORONARY ARTERY OF NATIVE HEART WITHOUT ANGINA PECTORIS: Primary | ICD-10-CM

## 2021-02-25 PROCEDURE — G8427 DOCREV CUR MEDS BY ELIG CLIN: HCPCS | Performed by: INTERNAL MEDICINE

## 2021-02-25 PROCEDURE — 93000 ELECTROCARDIOGRAM COMPLETE: CPT | Performed by: INTERNAL MEDICINE

## 2021-02-25 PROCEDURE — 3017F COLORECTAL CA SCREEN DOC REV: CPT | Performed by: INTERNAL MEDICINE

## 2021-02-25 PROCEDURE — G8417 CALC BMI ABV UP PARAM F/U: HCPCS | Performed by: INTERNAL MEDICINE

## 2021-02-25 PROCEDURE — G8484 FLU IMMUNIZE NO ADMIN: HCPCS | Performed by: INTERNAL MEDICINE

## 2021-02-25 PROCEDURE — 99214 OFFICE O/P EST MOD 30 MIN: CPT | Performed by: INTERNAL MEDICINE

## 2021-02-25 PROCEDURE — 4004F PT TOBACCO SCREEN RCVD TLK: CPT | Performed by: INTERNAL MEDICINE

## 2021-02-25 RX ORDER — INSULIN GLARGINE 100 [IU]/ML
44 INJECTION, SOLUTION SUBCUTANEOUS 2 TIMES DAILY
Status: ON HOLD | COMMUNITY
End: 2021-06-12 | Stop reason: HOSPADM

## 2021-02-25 RX ORDER — SENNOSIDES 8.6 MG
650 CAPSULE ORAL 2 TIMES DAILY
Status: ON HOLD | COMMUNITY
End: 2021-06-12 | Stop reason: HOSPADM

## 2021-02-25 RX ORDER — AMPICILLIN TRIHYDRATE 250 MG
CAPSULE ORAL DAILY
Status: ON HOLD | COMMUNITY
End: 2021-06-12 | Stop reason: HOSPADM

## 2021-02-25 SDOH — HEALTH STABILITY: MENTAL HEALTH: HOW MANY STANDARD DRINKS CONTAINING ALCOHOL DO YOU HAVE ON A TYPICAL DAY?: NOT ASKED

## 2021-02-25 SDOH — HEALTH STABILITY: MENTAL HEALTH: HOW OFTEN DO YOU HAVE A DRINK CONTAINING ALCOHOL?: NOT ASKED

## 2021-02-25 NOTE — PROGRESS NOTES
[facility] Cardiology Progress Note  Dr. Valentin Rodriguez      Referring Physician: Malena Zayas MD  CHIEF COMPLAINT:   Chief Complaint   Patient presents with   [address] from [facility] admission 01/19/21 No cardiac complaints. HISTORY OF PRESENT ILLNESS:   Patient is 61year old female  with history of coronary artery disease status post multiple PCI's, status post CABG on May 31, 4274, systolic congestive heart failure, CVA, hypertension, diabetes mellitus, COPD, perforated gastric ulcer repair, s/p amputation of the second toe of the left foot, patient is here to follow-up  Patient denies any chest pain, no shortness of breath, no lightheadedness, no dizziness, no palpitations, no pedal edema, no PND, no orthopnea, no syncope, no presyncopal episodes.   Functional capacity is limited due to recent foot surgery        Past Medical History:   Diagnosis Date    Anxiety     CAD (coronary artery disease)     x5 stents    Cardiomyopathy ([address]     Cerebral artery occlusion with cerebral infarction ([address]     COPD (chronic obstructive pulmonary disease) ([address]     Depression     Diabetes mellitus ([address]     IDDM    Hx of cardiovascular stress test 3/22/2016    Lexiscan    Hyperlipidemia     Nonrheumatic mitral (valve) insufficiency     Nonrheumatic tricuspid (valve) insufficiency     Sleep apnea     cpap    Smoker     current 1PPD X35yrs         Past Surgical History:   Procedure Laterality Date    ABDOMEN SURGERY      ABDOMINAL EXPLORATION SURGERY N/A 6-5-16    Excision perforated mid body gastric ulcer, primary closure omentum patch    BACK SURGERY      CARDIAC CATHETERIZATION  05/03/2018    Dr. Marybel Amaya CATH LAB PROCEDURE  05/03/2018    Dr. Jh Orozco multiple PCIs     KNEE ARTHROPLASTY      TOE AMPUTATION Right 1/23/2021    RIGHT SECOND DIGIT AMPUTATION performed by Dio Mathew DPM at [address] [facility] TRANSESOPHAGEAL ECHOCARDIOGRAM  10/28/2016    Dr. Samantha Velázquez r/o embolic source         Current Outpatient Medications   Medication Sig Dispense Refill    acetaminophen (TYLENOL 8 HOUR ARTHRITIS PAIN) 650 MG extended release tablet Take 650 mg by mouth 2 times daily      insulin glargine (LANTUS) 100 UNIT/ML injection vial Inject 42 Units into the skin 2 times daily      Cinnamon 500 MG CAPS Take by mouth daily      canagliflozin (INVOKANA) 300 MG TABS tablet Take 300 mg by mouth every morning (before breakfast) lunchtime      Biotin 54350 MCG TABS Take by mouth daily       zinc 50 MG TABS tablet Take 50 mg by mouth daily      vitamin B-12 (CYANOCOBALAMIN) 1000 MCG tablet Take 1,000 mcg by mouth daily      Turmeric (QC TUMERIC COMPLEX) 500 MG CAPS Take by mouth daily       Ascorbic Acid (VITAMIN C) 1000 MG tablet Take 1,000 mg by mouth daily      Fluticasone Propionate (FLONASE ALLERGY RELIEF NA) by Nasal route daily       metoprolol succinate (TOPROL XL) 25 MG extended release tablet Take 1 tablet by mouth daily 90 tablet 3    furosemide (LASIX) 20 MG tablet Take 1 tablet by mouth daily (Patient taking differently: Take 20 mg by mouth every other day ) 90 tablet 3    sacubitril-valsartan (ENTRESTO) 24-26 MG per tablet Take 0.5 tablets by mouth 2 times daily Take a half of a tablet BID 90 tablet 3    Cholecalciferol (VITAMIN D) 125 MCG (5000 UT) CAPS Take 5,000 Units by mouth daily       pantoprazole (PROTONIX) 40 MG tablet Take 40 mg by mouth daily       atorvastatin (LIPITOR) 40 MG tablet Take 40 mg by mouth daily       escitalopram (LEXAPRO) 20 MG tablet Take 20 mg by mouth daily       ferrous sulfate 325 (65 Fe) MG tablet Take 325 mg by mouth 2 times daily       BREO ELLIPTA 200-25 MCG/INH AEPB       traZODone (DESYREL) 50 MG tablet Take 50 mg by mouth nightly      gabapentin (NEURONTIN) 300 MG capsule Take 900 mg by mouth daily.        clopidogrel (PLAVIX) 75 MG tablet Take 75 mg by mouth daily      albuterol sulfate  (90 BASE) MCG/ACT inhaler Inhale 2 puffs into the lungs every 6 hours as needed for Wheezing      aspirin 81 MG EC tablet Take 1 tablet by mouth daily 30 tablet 3    metFORMIN (GLUCOPHAGE) 1000 MG tablet Take 1 tablet by mouth 2 times daily (with meals) 60 tablet 3     No current facility-administered medications for this visit.           Allergies as of 02/25/2021 - Review Complete 02/25/2021   Allergen Reaction Noted    Ace inhibitors Other (See Comments) 11/11/2016    Percocet [oxycodone-acetaminophen] Other (See Comments) 11/11/2016       Social History     Socioeconomic History    Marital status:      Spouse name: Not on file    Number of children: Not on file    Years of education: Not on file    Highest education level: Not on file   Occupational History    Not on file   Social Needs    Financial resource strain: Not on file    Food insecurity     Worry: Not on file     Inability: Not on file    Transportation needs     Medical: Not on file     Non-medical: Not on file   Tobacco Use    Smoking status: Current Every Day Smoker     Packs/day: 1.00     Years: 43.00     Pack years: 43.00    Smokeless tobacco: Never Used   Substance and Sexual Activity    Alcohol use: No     Comment: 1 cups of coffee a day    Drug use: No    Sexual activity: Not Currently   Lifestyle    Physical activity     Days per week: Not on file     Minutes per session: Not on file    Stress: Not on file   Relationships    Social connections     Talks on phone: Not on file     Gets together: Not on file     Attends Nondenominational service: Not on file     Active member of club or organization: Not on file     Attends meetings of clubs or organizations: Not on file     Relationship status: Not on file    Intimate partner violence     Fear of current or ex partner: Not on file     Emotionally abused: Not on file     Physically abused: Not on file Forced sexual activity: Not on file   Other Topics Concern    Not on file   Social History Narrative    Not on file       Family History   Problem Relation Age of Onset    Heart Attack Father        REVIEW OF SYSTEMS:     CONSTITUTIONAL:  negative for  fevers, chills, sweats, + fatigue  HEENT:  negative for  tinnitus, earaches, nasal congestion and epistaxis  RESPIRATORY:  negative for  dry cough, cough with sputum, wheezing and hemoptysis  GASTROINTESTINAL:  negative for nausea, vomiting, diarrhea, constipation, pruritus and jaundice  HEMATOLOGIC/LYMPHATIC:  negative for easy bruising, bleeding, lymphadenopathy and petechiae  ENDOCRINE:  negative for heat intolerance, cold intolerance, tremor, hair loss and diabetic symptoms including neither polyuria nor polydipsia nor blurred vision  MUSCULOSKELETAL:  negative for  myalgias, arthralgias, joint swelling, stiff joints and decreased range of motion  NEUROLOGICAL:  negative for memory problems, speech problems, visual disturbance, dysphagia, weakness and numbness      PHYSICAL EXAM:   Constitutional:  Awake, alert cooperative, no apparent distress, and appears stated age. HEENT:  Moist and pink mucous membranes, normocephalic, without obvious abnormality, atraumatic, normal ears and nose. Neck:  Supple, symmetrical, trachea midline, no JVD, no adenopathy, thyroid symmetric, not enlarged and no tenderness, good carotid upstroke bilaterally, no carotid bruit, skin normal  Lungs: No increased work of breathing, good air exchange, clear to auscultation bilaterally, no crackles or wheezing. Cardiovascular: Normal apical impulse, regular rate and rhythm, normal S1 and S2, no S3 or F3,0/8 systolic murmur at the apex, 2/6 systolic murmur at the left lower sternal border, trace pedal edema, good carotid upstroke bilaterally, no carotid bruit, no JVD, no abdominal pulsating masses. Abdomen: Soft, nontender, no hepatomegaly, no splenomegaly, bowel sound positive. CHEST:  Expands symmetrically, nontender to palpation, healed midsternal surgical scar. Musculoskeletal:  No clubbing or cyanosis. No redness, warmth, or swelling of the joints. Neurological: Alert, awake, and oriented X3.      BP (!) 94/46 (Site: Right Upper Arm, Position: Sitting, Cuff Size: Large Adult)   Pulse 68   Ht 5' 9\" (1.753 m)   Wt 244 lb (110.7 kg)   BMI 36.03 kg/m²     DATA:  I personally reviewed the visit EKG with the following interpretation: Sinus rhythm low voltage QRS in the limb leads, old inferior wall MI age undetermined, poor R wave progression, no significant changes when compared to previous    EKG - 1/20/21 Sinus rhythm with occasional premature ventricular complexes  Possible Inferior infarct (cited on or before 06-JUN-2016)  Cannot rule out Anterior infarct , age undetermined  Abnormal ECG  When compared with ECG of 25-OCT-2016 06:00,  premature ventricular complexes are now present  Nonspecific T wave abnormality, improved in Lateral leads    ECHO: 12/7/18 12/7/2018) Severely depressed left ventricular systolic function with stage I diastolic dysfunction. Mildly dilated left atrium. Mild mitral valve regurgitation. Moderate tricuspid valve regurgitation with mild pulmonary hypertension. Stress Test: 3/27/18 1. Negative Lexiscan stress test for ischemic symptoms or ischemic EKG changes  2. Abnormal Cardiolite perfusion scan showing large fixed lateral wall defect with mild eduard-infarct ischemia, large, moderate reversible defect involving the anterior wall, fixed apical defect with mild to moderate eduard-infarct ischemia  3. Severely depressed left ventricular systolic function with global wall hypokinesis and paradoxical septal wall motion  4. There is no old comparison study    Angiography: 5/3/18 1. Totally occluded OM stent with a totally occluded left circumflex  artery stent with left-to-right collaterals.   2.  Moderate to severe disease involving the distal left main.  3.  Mild disease involving the right coronary artery. Cardiology Labs: BMP:    Lab Results   Component Value Date     01/25/2021    K 4.9 01/25/2021     01/25/2021    CO2 22 01/25/2021    BUN 39 01/25/2021    CREATININE 1.3 01/25/2021     CMP:    Lab Results   Component Value Date     01/25/2021    K 4.9 01/25/2021     01/25/2021    CO2 22 01/25/2021    BUN 39 01/25/2021    CREATININE 1.3 01/25/2021    PROT 7.1 01/25/2021     CBC:    Lab Results   Component Value Date    WBC 8.3 01/25/2021    RBC 4.45 01/25/2021    HGB 12.4 01/25/2021    HCT 39.3 01/25/2021    MCV 88.3 01/25/2021    RDW 13.5 01/25/2021     01/25/2021     PT/INR:  No results found for: PTINR  PT/INR Warfarin:  No components found for: PTPATWAR, PTINRWAR  PTT:    Lab Results   Component Value Date    APTT 27.8 04/30/2018     PTT Heparin:  No components found for: APTTHEP  Magnesium:    Lab Results   Component Value Date    MG 1.7 01/20/2021     TSH:    Lab Results   Component Value Date    TSH 2.680 01/20/2021     TROPONIN:  No components found for: TROP  BNP:    Lab Results   Component Value Date    BNP 65 06/17/2011     FASTING LIPID PANEL:    Lab Results   Component Value Date    CHOL 127 01/20/2021    HDL 27 01/20/2021    TRIG 237 01/20/2021     No orders to display     I have personally reviewed the laboratory, cardiac diagnostic and radiographic testing as outlined above:      IMPRESSION:  1: CAD: Status post CABG  In 2018, we'll obtain [facility] from [facility]              2: Chronic systolic congestive heart failure: Compensated will continue current treatment. 3: Ischemic cardiomyopathy: Last documented ejection fraction was 30-35 percent, declined AICD            4: Essential (primary) hypertension:   controlled, continue current treatment.                      5: Mitral valve regurgitation mild             6: Tricuspid valve regurgitation moderate            7: Pulmonary

## 2021-03-30 RX ORDER — SACUBITRIL AND VALSARTAN 24; 26 MG/1; MG/1
TABLET, FILM COATED ORAL
Qty: 90 TABLET | Refills: 3 | Status: ON HOLD
Start: 2021-03-30 | End: 2021-06-12 | Stop reason: HOSPADM

## 2021-06-10 ENCOUNTER — HOSPITAL ENCOUNTER (INPATIENT)
Age: 56
LOS: 2 days | Discharge: HOME | DRG: 193 | End: 2021-06-12
Attending: EMERGENCY MEDICINE | Admitting: INTERNAL MEDICINE
Payer: MEDICARE

## 2021-06-10 ENCOUNTER — APPOINTMENT (OUTPATIENT)
Dept: GENERAL RADIOLOGY | Age: 56
DRG: 193 | End: 2021-06-10
Payer: MEDICARE

## 2021-06-10 ENCOUNTER — APPOINTMENT (OUTPATIENT)
Dept: CT IMAGING | Age: 56
DRG: 193 | End: 2021-06-10
Payer: MEDICARE

## 2021-06-10 DIAGNOSIS — J44.1 COPD EXACERBATION (HCC): ICD-10-CM

## 2021-06-10 DIAGNOSIS — J96.21 ACUTE ON CHRONIC RESPIRATORY FAILURE WITH HYPOXIA (HCC): Primary | ICD-10-CM

## 2021-06-10 PROBLEM — J96.00 ACUTE RESPIRATORY FAILURE (HCC): Status: ACTIVE | Noted: 2021-06-10

## 2021-06-10 LAB
ALBUMIN SERPL-MCNC: 4 G/DL (ref 3.5–5.2)
ALP BLD-CCNC: 113 U/L (ref 35–104)
ALT SERPL-CCNC: 11 U/L (ref 0–32)
ANION GAP SERPL CALCULATED.3IONS-SCNC: 14 MMOL/L (ref 7–16)
AST SERPL-CCNC: 10 U/L (ref 0–31)
B.E.: -4.3 MMOL/L (ref -3–3)
BASOPHILS ABSOLUTE: 0.04 E9/L (ref 0–0.2)
BASOPHILS RELATIVE PERCENT: 0.2 % (ref 0–2)
BILIRUB SERPL-MCNC: 0.3 MG/DL (ref 0–1.2)
BUN BLDV-MCNC: 47 MG/DL (ref 6–20)
CALCIUM SERPL-MCNC: 9.5 MG/DL (ref 8.6–10.2)
CHLORIDE BLD-SCNC: 102 MMOL/L (ref 98–107)
CO2: 22 MMOL/L (ref 22–29)
COHB: 4.1 % (ref 0–1.5)
CREAT SERPL-MCNC: 1.8 MG/DL (ref 0.5–1)
CREATININE URINE: 45 MG/DL (ref 29–226)
CRITICAL: ABNORMAL
D DIMER: 467 NG/ML DDU
DATE ANALYZED: ABNORMAL
DATE OF COLLECTION: ABNORMAL
EKG ATRIAL RATE: 106 BPM
EKG ATRIAL RATE: 88 BPM
EKG P AXIS: 81 DEGREES
EKG P AXIS: 83 DEGREES
EKG P-R INTERVAL: 184 MS
EKG P-R INTERVAL: 196 MS
EKG Q-T INTERVAL: 344 MS
EKG Q-T INTERVAL: 388 MS
EKG QRS DURATION: 102 MS
EKG QRS DURATION: 102 MS
EKG QTC CALCULATION (BAZETT): 456 MS
EKG QTC CALCULATION (BAZETT): 469 MS
EKG R AXIS: -10 DEGREES
EKG R AXIS: 8 DEGREES
EKG T AXIS: 75 DEGREES
EKG T AXIS: 79 DEGREES
EKG VENTRICULAR RATE: 106 BPM
EKG VENTRICULAR RATE: 88 BPM
EOSINOPHIL, URINE: 5 % (ref 0–1)
EOSINOPHILS ABSOLUTE: 0.1 E9/L (ref 0.05–0.5)
EOSINOPHILS RELATIVE PERCENT: 0.6 % (ref 0–6)
GFR AFRICAN AMERICAN: 35
GFR NON-AFRICAN AMERICAN: 29 ML/MIN/1.73
GLUCOSE BLD-MCNC: 189 MG/DL (ref 74–99)
HBA1C MFR BLD: 6.7 % (ref 4–5.6)
HCO3: 21.9 MMOL/L (ref 22–26)
HCT VFR BLD CALC: 42.3 % (ref 34–48)
HEMOGLOBIN: 13.3 G/DL (ref 11.5–15.5)
HHB: 4.1 % (ref 0–5)
IMMATURE GRANULOCYTES #: 0.07 E9/L
IMMATURE GRANULOCYTES %: 0.4 % (ref 0–5)
LAB: ABNORMAL
LIPASE: 51 U/L (ref 13–60)
LYMPHOCYTES ABSOLUTE: 1.08 E9/L (ref 1.5–4)
LYMPHOCYTES RELATIVE PERCENT: 6.3 % (ref 20–42)
Lab: ABNORMAL
MCH RBC QN AUTO: 27.6 PG (ref 26–35)
MCHC RBC AUTO-ENTMCNC: 31.4 % (ref 32–34.5)
MCV RBC AUTO: 87.8 FL (ref 80–99.9)
METER GLUCOSE: 214 MG/DL (ref 74–99)
METER GLUCOSE: 289 MG/DL (ref 74–99)
METER GLUCOSE: 342 MG/DL (ref 74–99)
METHB: 0.3 % (ref 0–1.5)
MODE: ABNORMAL
MONOCYTES ABSOLUTE: 0.68 E9/L (ref 0.1–0.95)
MONOCYTES RELATIVE PERCENT: 4 % (ref 2–12)
NEUTROPHILS ABSOLUTE: 15.15 E9/L (ref 1.8–7.3)
NEUTROPHILS RELATIVE PERCENT: 88.5 % (ref 43–80)
O2 CONTENT: 16.7 ML/DL
O2 SATURATION: 95.7 % (ref 92–98.5)
O2HB: 91.5 % (ref 94–97)
OPERATOR ID: 2658
OSMOLALITY URINE: 485 MOSM/KG (ref 300–900)
PATIENT TEMP: 37 C
PCO2: 44.9 MMHG (ref 35–45)
PDW BLD-RTO: 14.2 FL (ref 11.5–15)
PH BLOOD GAS: 7.31 (ref 7.35–7.45)
PLATELET # BLD: 325 E9/L (ref 130–450)
PMV BLD AUTO: 10 FL (ref 7–12)
PO2: 84.5 MMHG (ref 75–100)
POTASSIUM REFLEX MAGNESIUM: 4.9 MMOL/L (ref 3.5–5)
PRO-BNP: 653 PG/ML (ref 0–125)
PROCALCITONIN: 2.19 NG/ML (ref 0–0.08)
RBC # BLD: 4.82 E12/L (ref 3.5–5.5)
SARS-COV-2, NAAT: NOT DETECTED
SODIUM BLD-SCNC: 138 MMOL/L (ref 132–146)
SODIUM URINE: 51 MMOL/L
SOURCE, BLOOD GAS: ABNORMAL
T4 FREE: 1.12 NG/DL (ref 0.93–1.7)
THB: 12.9 G/DL (ref 11.5–16.5)
TIME ANALYZED: 945
TOTAL PROTEIN: 7.6 G/DL (ref 6.4–8.3)
TROPONIN, HIGH SENSITIVITY: 33 NG/L (ref 0–9)
TROPONIN, HIGH SENSITIVITY: 55 NG/L (ref 0–9)
TROPONIN, HIGH SENSITIVITY: 58 NG/L (ref 0–9)
TSH SERPL DL<=0.05 MIU/L-ACNC: 0.35 UIU/ML (ref 0.27–4.2)
UREA NITROGEN, UR: 469 MG/DL (ref 800–1666)
WBC # BLD: 17.1 E9/L (ref 4.5–11.5)

## 2021-06-10 PROCEDURE — 93005 ELECTROCARDIOGRAM TRACING: CPT | Performed by: STUDENT IN AN ORGANIZED HEALTH CARE EDUCATION/TRAINING PROGRAM

## 2021-06-10 PROCEDURE — 84300 ASSAY OF URINE SODIUM: CPT

## 2021-06-10 PROCEDURE — 82570 ASSAY OF URINE CREATININE: CPT

## 2021-06-10 PROCEDURE — 83935 ASSAY OF URINE OSMOLALITY: CPT

## 2021-06-10 PROCEDURE — 71045 X-RAY EXAM CHEST 1 VIEW: CPT

## 2021-06-10 PROCEDURE — 6360000002 HC RX W HCPCS: Performed by: NURSE PRACTITIONER

## 2021-06-10 PROCEDURE — 80053 COMPREHEN METABOLIC PANEL: CPT

## 2021-06-10 PROCEDURE — 82962 GLUCOSE BLOOD TEST: CPT

## 2021-06-10 PROCEDURE — 99285 EMERGENCY DEPT VISIT HI MDM: CPT

## 2021-06-10 PROCEDURE — 83690 ASSAY OF LIPASE: CPT

## 2021-06-10 PROCEDURE — 93010 ELECTROCARDIOGRAM REPORT: CPT | Performed by: INTERNAL MEDICINE

## 2021-06-10 PROCEDURE — 94669 MECHANICAL CHEST WALL OSCILL: CPT

## 2021-06-10 PROCEDURE — 84540 ASSAY OF URINE/UREA-N: CPT

## 2021-06-10 PROCEDURE — 6360000002 HC RX W HCPCS: Performed by: STUDENT IN AN ORGANIZED HEALTH CARE EDUCATION/TRAINING PROGRAM

## 2021-06-10 PROCEDURE — 1200000000 HC SEMI PRIVATE

## 2021-06-10 PROCEDURE — 6360000004 HC RX CONTRAST MEDICATION: Performed by: RADIOLOGY

## 2021-06-10 PROCEDURE — 93005 ELECTROCARDIOGRAM TRACING: CPT | Performed by: EMERGENCY MEDICINE

## 2021-06-10 PROCEDURE — 84484 ASSAY OF TROPONIN QUANT: CPT

## 2021-06-10 PROCEDURE — 84145 PROCALCITONIN (PCT): CPT

## 2021-06-10 PROCEDURE — 96374 THER/PROPH/DIAG INJ IV PUSH: CPT

## 2021-06-10 PROCEDURE — 83880 ASSAY OF NATRIURETIC PEPTIDE: CPT

## 2021-06-10 PROCEDURE — 99222 1ST HOSP IP/OBS MODERATE 55: CPT | Performed by: INTERNAL MEDICINE

## 2021-06-10 PROCEDURE — 6370000000 HC RX 637 (ALT 250 FOR IP): Performed by: NURSE PRACTITIONER

## 2021-06-10 PROCEDURE — 85025 COMPLETE CBC W/AUTO DIFF WBC: CPT

## 2021-06-10 PROCEDURE — 6370000000 HC RX 637 (ALT 250 FOR IP): Performed by: STUDENT IN AN ORGANIZED HEALTH CARE EDUCATION/TRAINING PROGRAM

## 2021-06-10 PROCEDURE — 83036 HEMOGLOBIN GLYCOSYLATED A1C: CPT

## 2021-06-10 PROCEDURE — 82805 BLOOD GASES W/O2 SATURATION: CPT

## 2021-06-10 PROCEDURE — 85378 FIBRIN DEGRADE SEMIQUANT: CPT

## 2021-06-10 PROCEDURE — 36415 COLL VENOUS BLD VENIPUNCTURE: CPT

## 2021-06-10 PROCEDURE — 94640 AIRWAY INHALATION TREATMENT: CPT

## 2021-06-10 PROCEDURE — 84439 ASSAY OF FREE THYROXINE: CPT

## 2021-06-10 PROCEDURE — 71275 CT ANGIOGRAPHY CHEST: CPT

## 2021-06-10 PROCEDURE — 6370000000 HC RX 637 (ALT 250 FOR IP): Performed by: INTERNAL MEDICINE

## 2021-06-10 PROCEDURE — 84443 ASSAY THYROID STIM HORMONE: CPT

## 2021-06-10 PROCEDURE — 87635 SARS-COV-2 COVID-19 AMP PRB: CPT

## 2021-06-10 PROCEDURE — 87205 SMEAR GRAM STAIN: CPT

## 2021-06-10 PROCEDURE — 2580000003 HC RX 258: Performed by: NURSE PRACTITIONER

## 2021-06-10 PROCEDURE — 94664 DEMO&/EVAL PT USE INHALER: CPT

## 2021-06-10 PROCEDURE — 2580000003 HC RX 258: Performed by: STUDENT IN AN ORGANIZED HEALTH CARE EDUCATION/TRAINING PROGRAM

## 2021-06-10 RX ORDER — METOPROLOL SUCCINATE 25 MG/1
25 TABLET, EXTENDED RELEASE ORAL DAILY
Status: DISCONTINUED | OUTPATIENT
Start: 2021-06-10 | End: 2021-06-11

## 2021-06-10 RX ORDER — POLYETHYLENE GLYCOL 3350 17 G/17G
17 POWDER, FOR SOLUTION ORAL DAILY PRN
Status: DISCONTINUED | OUTPATIENT
Start: 2021-06-10 | End: 2021-06-12 | Stop reason: HOSPADM

## 2021-06-10 RX ORDER — SODIUM CHLORIDE 0.9 % (FLUSH) 0.9 %
5-40 SYRINGE (ML) INJECTION PRN
Status: DISCONTINUED | OUTPATIENT
Start: 2021-06-10 | End: 2021-06-12 | Stop reason: HOSPADM

## 2021-06-10 RX ORDER — NICOTINE POLACRILEX 4 MG
15 LOZENGE BUCCAL PRN
Status: DISCONTINUED | OUTPATIENT
Start: 2021-06-10 | End: 2021-06-12 | Stop reason: HOSPADM

## 2021-06-10 RX ORDER — 0.9 % SODIUM CHLORIDE 0.9 %
1000 INTRAVENOUS SOLUTION INTRAVENOUS ONCE
Status: COMPLETED | OUTPATIENT
Start: 2021-06-10 | End: 2021-06-10

## 2021-06-10 RX ORDER — ONDANSETRON 2 MG/ML
4 INJECTION INTRAMUSCULAR; INTRAVENOUS EVERY 6 HOURS PRN
Status: DISCONTINUED | OUTPATIENT
Start: 2021-06-10 | End: 2021-06-12 | Stop reason: HOSPADM

## 2021-06-10 RX ORDER — FERROUS SULFATE 325(65) MG
325 TABLET ORAL 2 TIMES DAILY
Status: DISCONTINUED | OUTPATIENT
Start: 2021-06-10 | End: 2021-06-10

## 2021-06-10 RX ORDER — SODIUM CHLORIDE 9 MG/ML
25 INJECTION, SOLUTION INTRAVENOUS PRN
Status: DISCONTINUED | OUTPATIENT
Start: 2021-06-10 | End: 2021-06-12 | Stop reason: HOSPADM

## 2021-06-10 RX ORDER — IPRATROPIUM BROMIDE AND ALBUTEROL SULFATE 2.5; .5 MG/3ML; MG/3ML
3 SOLUTION RESPIRATORY (INHALATION) ONCE
Status: COMPLETED | OUTPATIENT
Start: 2021-06-10 | End: 2021-06-10

## 2021-06-10 RX ORDER — GABAPENTIN 400 MG/1
400 CAPSULE ORAL DAILY
Status: DISCONTINUED | OUTPATIENT
Start: 2021-06-10 | End: 2021-06-10

## 2021-06-10 RX ORDER — DEXTROSE MONOHYDRATE 50 MG/ML
100 INJECTION, SOLUTION INTRAVENOUS PRN
Status: DISCONTINUED | OUTPATIENT
Start: 2021-06-10 | End: 2021-06-12 | Stop reason: HOSPADM

## 2021-06-10 RX ORDER — PANTOPRAZOLE SODIUM 40 MG/1
40 TABLET, DELAYED RELEASE ORAL DAILY
Status: DISCONTINUED | OUTPATIENT
Start: 2021-06-10 | End: 2021-06-11

## 2021-06-10 RX ORDER — BUDESONIDE 0.5 MG/2ML
0.5 INHALANT ORAL 2 TIMES DAILY
Status: DISCONTINUED | OUTPATIENT
Start: 2021-06-10 | End: 2021-06-12 | Stop reason: HOSPADM

## 2021-06-10 RX ORDER — METHYLPREDNISOLONE SODIUM SUCCINATE 40 MG/ML
40 INJECTION, POWDER, LYOPHILIZED, FOR SOLUTION INTRAMUSCULAR; INTRAVENOUS EVERY 8 HOURS
Status: DISCONTINUED | OUTPATIENT
Start: 2021-06-10 | End: 2021-06-11

## 2021-06-10 RX ORDER — SODIUM CHLORIDE 0.9 % (FLUSH) 0.9 %
5-40 SYRINGE (ML) INJECTION EVERY 12 HOURS SCHEDULED
Status: DISCONTINUED | OUTPATIENT
Start: 2021-06-10 | End: 2021-06-12 | Stop reason: HOSPADM

## 2021-06-10 RX ORDER — ACETAMINOPHEN 325 MG/1
650 TABLET ORAL EVERY 6 HOURS PRN
Status: DISCONTINUED | OUTPATIENT
Start: 2021-06-10 | End: 2021-06-12 | Stop reason: HOSPADM

## 2021-06-10 RX ORDER — ESCITALOPRAM OXALATE 10 MG/1
20 TABLET ORAL NIGHTLY
Status: DISCONTINUED | OUTPATIENT
Start: 2021-06-10 | End: 2021-06-12 | Stop reason: HOSPADM

## 2021-06-10 RX ORDER — POTASSIUM CHLORIDE 20 MEQ/1
40 TABLET, EXTENDED RELEASE ORAL PRN
Status: DISCONTINUED | OUTPATIENT
Start: 2021-06-10 | End: 2021-06-12 | Stop reason: HOSPADM

## 2021-06-10 RX ORDER — METHYLPREDNISOLONE SODIUM SUCCINATE 125 MG/2ML
125 INJECTION, POWDER, LYOPHILIZED, FOR SOLUTION INTRAMUSCULAR; INTRAVENOUS ONCE
Status: COMPLETED | OUTPATIENT
Start: 2021-06-10 | End: 2021-06-10

## 2021-06-10 RX ORDER — ARFORMOTEROL TARTRATE 15 UG/2ML
15 SOLUTION RESPIRATORY (INHALATION) 2 TIMES DAILY
Status: DISCONTINUED | OUTPATIENT
Start: 2021-06-10 | End: 2021-06-12 | Stop reason: HOSPADM

## 2021-06-10 RX ORDER — ATORVASTATIN CALCIUM 40 MG/1
40 TABLET, FILM COATED ORAL DAILY
Status: DISCONTINUED | OUTPATIENT
Start: 2021-06-10 | End: 2021-06-12 | Stop reason: HOSPADM

## 2021-06-10 RX ORDER — TRAZODONE HYDROCHLORIDE 50 MG/1
50 TABLET ORAL NIGHTLY
Status: DISCONTINUED | OUTPATIENT
Start: 2021-06-10 | End: 2021-06-12 | Stop reason: HOSPADM

## 2021-06-10 RX ORDER — FERROUS SULFATE 325(65) MG
325 TABLET ORAL
Status: DISCONTINUED | OUTPATIENT
Start: 2021-06-11 | End: 2021-06-12 | Stop reason: HOSPADM

## 2021-06-10 RX ORDER — ACETAMINOPHEN 650 MG/1
650 SUPPOSITORY RECTAL EVERY 6 HOURS PRN
Status: DISCONTINUED | OUTPATIENT
Start: 2021-06-10 | End: 2021-06-12 | Stop reason: HOSPADM

## 2021-06-10 RX ORDER — SODIUM CHLORIDE 9 MG/ML
INJECTION, SOLUTION INTRAVENOUS CONTINUOUS
Status: DISCONTINUED | OUTPATIENT
Start: 2021-06-10 | End: 2021-06-12 | Stop reason: HOSPADM

## 2021-06-10 RX ORDER — CLOPIDOGREL BISULFATE 75 MG/1
75 TABLET ORAL DAILY
Status: DISCONTINUED | OUTPATIENT
Start: 2021-06-10 | End: 2021-06-12 | Stop reason: HOSPADM

## 2021-06-10 RX ORDER — DEXTROSE MONOHYDRATE 25 G/50ML
12.5 INJECTION, SOLUTION INTRAVENOUS PRN
Status: DISCONTINUED | OUTPATIENT
Start: 2021-06-10 | End: 2021-06-12 | Stop reason: HOSPADM

## 2021-06-10 RX ORDER — ESCITALOPRAM OXALATE 10 MG/1
20 TABLET ORAL NIGHTLY
Status: DISCONTINUED | OUTPATIENT
Start: 2021-06-11 | End: 2021-06-10

## 2021-06-10 RX ORDER — ALBUTEROL SULFATE 2.5 MG/3ML
2.5 SOLUTION RESPIRATORY (INHALATION) EVERY 4 HOURS PRN
Status: DISCONTINUED | OUTPATIENT
Start: 2021-06-10 | End: 2021-06-12 | Stop reason: HOSPADM

## 2021-06-10 RX ORDER — ASPIRIN 81 MG/1
81 TABLET ORAL DAILY
Status: DISCONTINUED | OUTPATIENT
Start: 2021-06-10 | End: 2021-06-12 | Stop reason: HOSPADM

## 2021-06-10 RX ORDER — ESCITALOPRAM OXALATE 10 MG/1
20 TABLET ORAL DAILY
Status: DISCONTINUED | OUTPATIENT
Start: 2021-06-10 | End: 2021-06-10

## 2021-06-10 RX ORDER — POTASSIUM CHLORIDE 7.45 MG/ML
10 INJECTION INTRAVENOUS PRN
Status: DISCONTINUED | OUTPATIENT
Start: 2021-06-10 | End: 2021-06-12 | Stop reason: HOSPADM

## 2021-06-10 RX ORDER — GABAPENTIN 300 MG/1
300 CAPSULE ORAL ONCE
Status: COMPLETED | OUTPATIENT
Start: 2021-06-10 | End: 2021-06-10

## 2021-06-10 RX ORDER — INSULIN GLARGINE 100 [IU]/ML
44 INJECTION, SOLUTION SUBCUTANEOUS 2 TIMES DAILY
Status: DISCONTINUED | OUTPATIENT
Start: 2021-06-10 | End: 2021-06-12 | Stop reason: HOSPADM

## 2021-06-10 RX ORDER — LABETALOL HYDROCHLORIDE 5 MG/ML
5 INJECTION, SOLUTION INTRAVENOUS EVERY 4 HOURS PRN
Status: DISCONTINUED | OUTPATIENT
Start: 2021-06-10 | End: 2021-06-12 | Stop reason: HOSPADM

## 2021-06-10 RX ORDER — FUROSEMIDE 20 MG/1
20 TABLET ORAL DAILY
Status: DISCONTINUED | OUTPATIENT
Start: 2021-06-10 | End: 2021-06-11

## 2021-06-10 RX ORDER — DOXYCYCLINE HYCLATE 100 MG/1
100 CAPSULE ORAL EVERY 12 HOURS SCHEDULED
Status: DISCONTINUED | OUTPATIENT
Start: 2021-06-10 | End: 2021-06-12 | Stop reason: HOSPADM

## 2021-06-10 RX ORDER — MAGNESIUM SULFATE 1 G/100ML
1000 INJECTION INTRAVENOUS PRN
Status: DISCONTINUED | OUTPATIENT
Start: 2021-06-10 | End: 2021-06-12 | Stop reason: HOSPADM

## 2021-06-10 RX ORDER — ONDANSETRON 4 MG/1
4 TABLET, ORALLY DISINTEGRATING ORAL EVERY 8 HOURS PRN
Status: DISCONTINUED | OUTPATIENT
Start: 2021-06-10 | End: 2021-06-12 | Stop reason: HOSPADM

## 2021-06-10 RX ORDER — GABAPENTIN 400 MG/1
400 CAPSULE ORAL NIGHTLY
Status: DISCONTINUED | OUTPATIENT
Start: 2021-06-10 | End: 2021-06-12 | Stop reason: HOSPADM

## 2021-06-10 RX ADMIN — SODIUM CHLORIDE 1000 ML: 9 INJECTION, SOLUTION INTRAVENOUS at 12:26

## 2021-06-10 RX ADMIN — INSULIN LISPRO 3 UNITS: 100 INJECTION, SOLUTION INTRAVENOUS; SUBCUTANEOUS at 20:42

## 2021-06-10 RX ADMIN — TRAZODONE HYDROCHLORIDE 50 MG: 50 TABLET ORAL at 20:42

## 2021-06-10 RX ADMIN — INSULIN GLARGINE 44 UNITS: 100 INJECTION, SOLUTION SUBCUTANEOUS at 20:41

## 2021-06-10 RX ADMIN — METHYLPREDNISOLONE SODIUM SUCCINATE 40 MG: 40 INJECTION, POWDER, FOR SOLUTION INTRAMUSCULAR; INTRAVENOUS at 15:29

## 2021-06-10 RX ADMIN — ASPIRIN 81 MG: 81 TABLET, COATED ORAL at 16:15

## 2021-06-10 RX ADMIN — DOXYCYCLINE HYCLATE 100 MG: 100 CAPSULE ORAL at 20:42

## 2021-06-10 RX ADMIN — CEFTRIAXONE SODIUM 1000 MG: 1 INJECTION, POWDER, FOR SOLUTION INTRAMUSCULAR; INTRAVENOUS at 15:29

## 2021-06-10 RX ADMIN — SODIUM CHLORIDE: 9 INJECTION, SOLUTION INTRAVENOUS at 15:40

## 2021-06-10 RX ADMIN — GABAPENTIN 300 MG: 300 CAPSULE ORAL at 21:21

## 2021-06-10 RX ADMIN — IPRATROPIUM BROMIDE AND ALBUTEROL SULFATE 3 AMPULE: .5; 3 SOLUTION RESPIRATORY (INHALATION) at 06:50

## 2021-06-10 RX ADMIN — IOPAMIDOL 75 ML: 755 INJECTION, SOLUTION INTRAVENOUS at 10:43

## 2021-06-10 RX ADMIN — CLOPIDOGREL BISULFATE 75 MG: 75 TABLET ORAL at 16:16

## 2021-06-10 RX ADMIN — PANTOPRAZOLE SODIUM 40 MG: 40 TABLET, DELAYED RELEASE ORAL at 16:15

## 2021-06-10 RX ADMIN — ENOXAPARIN SODIUM 40 MG: 40 INJECTION SUBCUTANEOUS at 15:29

## 2021-06-10 RX ADMIN — FERROUS SULFATE TAB 325 MG (65 MG ELEMENTAL FE) 325 MG: 325 (65 FE) TAB at 16:15

## 2021-06-10 RX ADMIN — METHYLPREDNISOLONE SODIUM SUCCINATE 40 MG: 40 INJECTION, POWDER, FOR SOLUTION INTRAMUSCULAR; INTRAVENOUS at 23:58

## 2021-06-10 RX ADMIN — BUDESONIDE 500 MCG: 0.5 INHALANT RESPIRATORY (INHALATION) at 20:19

## 2021-06-10 RX ADMIN — METOPROLOL SUCCINATE 25 MG: 25 TABLET, EXTENDED RELEASE ORAL at 16:15

## 2021-06-10 RX ADMIN — SODIUM CHLORIDE 1000 ML: 9 INJECTION, SOLUTION INTRAVENOUS at 07:41

## 2021-06-10 RX ADMIN — INSULIN LISPRO 8 UNITS: 100 INJECTION, SOLUTION INTRAVENOUS; SUBCUTANEOUS at 17:55

## 2021-06-10 RX ADMIN — METHYLPREDNISOLONE SODIUM SUCCINATE 125 MG: 125 INJECTION, POWDER, FOR SOLUTION INTRAMUSCULAR; INTRAVENOUS at 08:00

## 2021-06-10 RX ADMIN — INSULIN LISPRO 4 UNITS: 100 INJECTION, SOLUTION INTRAVENOUS; SUBCUTANEOUS at 15:30

## 2021-06-10 RX ADMIN — ARFORMOTEROL TARTRATE 15 MCG: 15 SOLUTION RESPIRATORY (INHALATION) at 20:20

## 2021-06-10 RX ADMIN — ESCITALOPRAM OXALATE 20 MG: 10 TABLET ORAL at 20:42

## 2021-06-10 RX ADMIN — ATORVASTATIN CALCIUM 40 MG: 40 TABLET, FILM COATED ORAL at 16:15

## 2021-06-10 RX ADMIN — GABAPENTIN 400 MG: 400 CAPSULE ORAL at 20:42

## 2021-06-10 ASSESSMENT — ENCOUNTER SYMPTOMS
SORE THROAT: 0
ABDOMINAL PAIN: 0
SHORTNESS OF BREATH: 1
CHEST TIGHTNESS: 0
VOMITING: 0
COUGH: 1
NAUSEA: 0
BACK PAIN: 0
DIARRHEA: 0
ABDOMINAL DISTENTION: 0

## 2021-06-10 NOTE — ED PROVIDER NOTES
HPI     Patient is a 61 y.o. femalewith a past medical history of diabetes, CAD, NSTEMI, CVA, and cellulitis who presents with a chief complaint of Shortness of breath. This has been occurring for one day. Patient states that it gets better with nothing. Patient states that it gets worse with nothing. Patient states that it is moderate in severity. Patient presents with chief complaint of shortness of breath. Patient states that 2 days ago she had some abdominal discomfort as well as back pains. That lasted for 1 day. Patient then woke up this morning and had some shortness of breath. Patient has a chronic cough that has not changed. Patient has chronic white sputum production that has not changed. This morning patient also noted that she had some rigors as well as fevers at home. Patient has diarrhea since 2 days ago. Patient has no current chest pain no abdominal pain. Patient states that the main reason she is coming in is because of the rigors and shortness of breath. Patient is not on oxygen at home. Patient denies any changes in urinary status. Patient denies any nausea or vomiting at this time. Patient recently had a Covid test.  Review of Systems   Constitutional: Negative for activity change, appetite change, chills, fatigue and fever. HENT: Negative for congestion, drooling and sore throat. Respiratory: Positive for cough and shortness of breath. Negative for chest tightness. Cardiovascular: Negative for chest pain and palpitations. Gastrointestinal: Negative for abdominal distention, abdominal pain, diarrhea, nausea and vomiting. Genitourinary: Negative for decreased urine volume, difficulty urinating, enuresis, flank pain, frequency and hematuria. Musculoskeletal: Negative for arthralgias, back pain and neck stiffness. Skin: Negative for rash and wound. Neurological: Negative for dizziness, facial asymmetry, light-headedness and headaches. Psychiatric/Behavioral: Negative for agitation, confusion and decreased concentration. Physical Exam  Constitutional:       General: She is not in acute distress. Appearance: Normal appearance. She is not ill-appearing. HENT:      Nose: Nose normal. No congestion or rhinorrhea. Mouth/Throat:      Mouth: Mucous membranes are moist.   Eyes:      Extraocular Movements: Extraocular movements intact. Pupils: Pupils are equal, round, and reactive to light. Cardiovascular:      Rate and Rhythm: Normal rate and regular rhythm. Pulses: Normal pulses. Heart sounds: Normal heart sounds. No murmur heard. Pulmonary:      Effort: Pulmonary effort is normal. No respiratory distress. Breath sounds: Normal breath sounds. No stridor. No wheezing. Comments: Bilateral diminished breath sounds. Patient satting 98% on 3 L. Abdominal:      General: Abdomen is flat. There is no distension. Palpations: Abdomen is soft. Tenderness: There is no abdominal tenderness. There is no guarding. Musculoskeletal:         General: No swelling or tenderness. Skin:     General: Skin is warm and dry. Capillary Refill: Capillary refill takes less than 2 seconds. Neurological:      General: No focal deficit present. Mental Status: She is alert and oriented to person, place, and time. Psychiatric:         Mood and Affect: Mood normal.         Behavior: Behavior normal.          Procedures    EKG read interpreted by me. Rate 106 bpm.  Poor R wave progression. No ST elevations or depressions. Normal RI intervals. Normal QRS. Otherwise normal EKG. [facility]     ED Course as of Mati 10 1527   Thu Mati 10, 2021   0749 Patient reevaluated and stated that her symptoms are improved. Patient was placed back on room air and was satting 88% at rest.    [JM]   6214 Discussed admission with patient who is agreeable to this plan.     [JM]   4107 Consult sent to Internal medicine    [JM] 3790 Patient reevaluated and is now mildly diaphoretic. Patient is on 3 L of oxygen that she was before and satting 88%. Patient was bumped to 4 L of oxygen. Patient now satting 93%. Patient is denying any chest pain or shortness of breath at this time. Patient states that her symptoms are improved. Patient clinically looks worse. [JM]   0932 Second EKG. Rate 80 bpm.  Left axis deviation. Poor R wave progression. No ST elevations or depressions. Compared to prior EKG with no acute changes. [JM]   0716 Patient reevaluated and is answering questions but appears mildly somnolent. Patient had ABG drawn. Patient had repeat EKG that shows no acute changes. Patient continues to deny any chest pain or shortness of breath at this time. [JM]   1009 Patient's new hypoxia and increasing troponin. Patient had a CTA to rule out PE. Patient benefits outweigh the risk for contrast at this time. [JM]   1030 Shared decision making for CT scan. Patient has agreeable to a CTA given her kidney function. [JM]   1100 Internal medicine came and saw patient. Discussed case with internal medicine NP. [JM]      ED Course User Index  [JM] Marinda Mcardle, MD      Patient is a 61 y.o. female presenting with shortness of breath. Patient has no current abdominal pain or back pain. Patient is satting 98% on 3L but satted 84% on room air. Patient has also been having some fevers and chills. Patient is not fully vaccinated. Patient will have cardiac work-up done. Patient will have a Covid test.  She had a D-dimer drawn. Patient's initial Covid test was negative. Patient had an elevated D-dimer over 400. Patient's BNP was significantly below her baseline. Patient had a creatinine elevation to 1.8. Patient's baseline is 1.2. Given patient's kidney function she cannot have a CTA. Patient had mild improvement of her symptoms but was still satting 88% on room air.  Patient's chest x-ray did not indicate fluid overload and patient was given a liter of fluids with mild improvement of her tachycardia. Patient continued to have deterioration of her symptoms. Patient had ABG drawn. Patient's ABG was notable for some mild hypoxia in the setting of 4 L of oxygen. Patient became more somnolent. Patient had a CTA of the chest after shared decision making with the patient and giving the patient fluids. Patient CTA was negative for a blood clot. Patient will be admitted for COPD exacerbation. Discussed with internal medicine who agreed to by patient. Patient was given return precautions. Patient will follow up with their primary care provider. Patient is agreeable to this plan. Patient has remained stable throughout their stay in the ED. Patient was seen and evaluated by myself and my attending Jade Dent DO. Assessment and Plan discussed with attending provider, please see attestation for final plan of care. This note was done using dictation software and there may be some grammatical errors associated with this. Wendi Perez MD     ED Course as of Mati 10 1528   Thu Mati 10, 2021   1928 Patient reevaluated and stated that her symptoms are improved. Patient was placed back on room air and was satting 88% at rest.    [JM]   0750 Discussed admission with patient who is agreeable to this plan. [JM]   2622 Consult sent to Internal medicine    [JM]   1114 Patient reevaluated and is now mildly diaphoretic. Patient is on 3 L of oxygen that she was before and satting 88%. Patient was bumped to 4 L of oxygen. Patient now satting 93%. Patient is denying any chest pain or shortness of breath at this time. Patient states that her symptoms are improved. Patient clinically looks worse. [JM]   0932 Second EKG. Rate 80 bpm.  Left axis deviation. Poor R wave progression. No ST elevations or depressions. Compared to prior EKG with no acute changes.     [JM]   0108 Patient reevaluated and is answering questions but appears mildly somnolent. Patient had ABG drawn. Patient had repeat EKG that shows no acute changes. Patient continues to deny any chest pain or shortness of breath at this time. [JM]   1009 Patient's new hypoxia and increasing troponin. Patient had a CTA to rule out PE. Patient benefits outweigh the risk for contrast at this time. [JM]   1030 Shared decision making for CT scan. Patient has agreeable to a CTA given her kidney function. [JM]   1100 Internal medicine came and saw patient. Discussed case with internal medicine NP. [JM]      ED Course User Index  [JM] Leticia Krabbe, MD       --------------------------------------------- PAST HISTORY ---------------------------------------------  Past Medical History:  has a past medical history of Anxiety, CAD (coronary artery disease), Cardiomyopathy ([facility] [address] Cerebral artery occlusion with cerebral infarction ([facility] [address] COPD (chronic obstructive pulmonary disease) ([address] Depression, Diabetes mellitus ([facility] [address] Hx of cardiovascular stress test, Hyperlipidemia, Nonrheumatic mitral (valve) insufficiency, Nonrheumatic tricuspid (valve) insufficiency, Sleep apnea, and Smoker. Past Surgical History:  has a past surgical history that includes Coronary angioplasty with stent; Abdomen surgery; Abdominal exploration surgery (N/A, 6-5-16); transesophageal echocardiogram (10/28/2016); Cardiac catheterization (05/03/2018); Diagnostic Cardiac Cath Lab Procedure (05/03/2018); Coronary artery bypass graft; back surgery; Cholecystectomy; Knee Arthroplasty; and Toe amputation (Right, 1/23/2021). Social History:  reports that she has been smoking. She has a 43.00 pack-year smoking history. She has never used smokeless tobacco. She reports that she does not drink alcohol and does not use drugs. Family History: family history includes Heart Attack in her father. The patients home medications have been reviewed.     Allergies: Ace inhibitors and Peptide   Result Value Ref Range    Pro- (H) 0 - 125 pg/mL   Lipase   Result Value Ref Range    Lipase 51 13 - 60 U/L   D-Dimer, Quantitative   Result Value Ref Range    D-Dimer, Quant 467 ng/mL DDU   Troponin   Result Value Ref Range    Troponin, High Sensitivity 55 (H) 0 - 9 ng/L   Blood Gas, Arterial   Result Value Ref Range    Date Analyzed [id number]     Time Analyzed 0945     Source: Blood Arterial     pH, Blood Gas 7.307 (L) 7.350 - 7.450    PCO2 44.9 35.0 - 45.0 mmHg    PO2 84.5 75.0 - 100.0 mmHg    HCO3 21.9 (L) 22.0 - 26.0 mmol/L    B.E. -4.3 (L) -3.0 - 3.0 mmol/L    O2 Sat 95.7 92.0 - 98.5 %    O2Hb 91.5 (L) 94.0 - 97.0 %    COHb 4.1 (H) 0.0 - 1.5 %    MetHb 0.3 0.0 - 1.5 %    O2 Content 16.7 mL/dL    HHb 4.1 0.0 - 5.0 %    tHb (est) 12.9 11.5 - 16.5 g/dL    Mode NC- 4 L     Date Of Collection      Time Collected      Pt Temp 37.0 C     ID [id number]     Lab [id number]     Critical(s) Notified . No Critical Values    POCT Glucose   Result Value Ref Range    Meter Glucose 214 (H) 74 - 99 mg/dL   EKG 12 Lead   Result Value Ref Range    Ventricular Rate 106 BPM    Atrial Rate 106 BPM    P-R Interval 184 ms    QRS Duration 102 ms    Q-T Interval 344 ms    QTc Calculation (Bazett) 456 ms    P Axis 83 degrees    R Axis 8 degrees    T Axis 75 degrees   EKG 12 Lead   Result Value Ref Range    Ventricular Rate 88 BPM    Atrial Rate 88 BPM    P-R Interval 196 ms    QRS Duration 102 ms    Q-T Interval 388 ms    QTc Calculation (Bazett) 469 ms    P Axis 81 degrees    R Axis -10 degrees    T Axis 79 degrees       RADIOLOGY:  CTA PULMONARY W CONTRAST   Final Result   No evidence of proximal pulmonary embolism. Subsegmental branches cannot be   adequately evaluated. Significant area of collapse and infiltrate in the left lower lobe. There   are small lower areas of atelectasis and scarring in the right base.          XR CHEST PORTABLE   Final Result   Scarring/atelectasis in the left lower lobe ------------------------- NURSING NOTES AND VITALS REVIEWED ---------------------------  Date / Time Roomed:  6/10/2021  6:20 AM  ED Bed Assignment:  [id number]    The nursing notes within the ED encounter and vital signs as below have been reviewed. Patient Vitals for the past 24 hrs:   BP Temp Temp src Pulse Resp SpO2 Height Weight   06/10/21 1144 (!) 117/55 98.8 °F (37.1 °C) Oral 84 16 97 %     06/10/21 0933 (!) 165/71   86 18 97 %     06/10/21 0800 (!) 123/56     94 %     06/10/21 0645 (!) 107/53   101 20 96 %     06/10/21 0629 126/81 99.1 °F (37.3 °C)  109 20 (!) 84 % 5' 9\" (1.753 m) 215 lb (97.5 kg)   06/10/21 0620  98 °F (36.7 °C) Infrared 114 20 91 % 5' 8\" (1.727 m) 250 lb (113.4 kg)       Oxygen Saturation Interpretation: Abnormal and Improved after treatment    ------------------------------------------ PROGRESS NOTES ------------------------------------------  Re-evaluation(s):  Patients symptoms are improving  Repeat physical examination is improved    Counseling:  I have spoken with the patient and discussed todays results, in addition to providing specific details for the plan of care and counseling regarding the diagnosis and prognosis. Their questions are answered at this time and they are agreeable with the plan of admission.    --------------------------------- ADDITIONAL PROVIDER NOTES ---------------------------------  Consultations:   Spoke with Dr. Amira Dasilva. Discussed case. They will admit the patient. This patient's ED course included: a personal history and physicial examination, re-evaluation prior to disposition, multiple bedside re-evaluations, IV medications, cardiac monitoring and continuous pulse oximetry    This patient has remained hemodynamically stable during their ED course. Diagnosis:  1. Acute on chronic respiratory failure with hypoxia (HCC)    2.  COPD exacerbation ([facility] [address]        Disposition:  Patient's disposition: Admit to telemetry  Patient's condition is stable. Jimbo Mittal MD  Resident  06/10/21 9883    ATTENDING PROVIDER ATTESTATION:     I have personally performed and/or participated in the history, exam, medical decision making, and procedures and agree with all pertinent clinical information. I have also reviewed and agree with the past medical, family and social history unless otherwise noted. I have discussed this patient in detail with the resident, and provided the instruction and education regarding cough, shortness of breath and pneumonia. My findings/Plan: Heart RRR. Lungs diminished bilaterally. Abdomen soft, nontender. Bowel sounds normal. Supportive care. Admit for further evaluation and treatment.            [id number] [facility],   06/16/21 7961

## 2021-06-10 NOTE — CONSULTS
[facility]) Physicians        CARDIOLOGY CONSULT                       PATIENT SEEN IN CONSULT FOR: Elevated Troponin    Hospital Day: 1     Seamus Bryant is a 61year old patient known to Dr. Thacker Go:  Pastora Carrillo is a 62-year-old female patient with history of CAD, s/p PCIs, s/p CABG in 2018, ischemic CMP, CKD presented to the emergency department feeling shaky and short of breath. Cardiology consulted for elevated troponin. She has history of COPD and felt that she was more wheezy and more dyspneic on a day-to-day basis. She is coughing and producing scant sputum but denies hemoptysis. CTA chest done in ER to exclude pulmonary embolism. ABG was obtained and showed a mild metabolic acidosis but otherwise only showed hypoxia. Acute kidney injury was noted upon chronic kidney disease stage III. She was subsequently admitted for CHF, CARYL. States that the last 2 days she been more wheezy and more short of breath. Is not necessarily worsened by exertion. She is coughing with scant sputum. She denies hemoptysis. She denies chest pain. She denies fevers, chills, sick contacts or exposures to her knowledge. No headache, no acute neurological symptoms. No PND or orthopnea. No loss of consciousness. She is compliant with her medications. Saw Dr Kamila Godinez Feb 25.   She declined ICD in the past.  Still smokes.                   ROS: Review of rest of 10 systems negative except as mentioned above    PAST MEDICAL Hx:  Past Medical History        Past Medical History:   Diagnosis Date    Anxiety      CAD (coronary artery disease)       x5 stents    Cardiomyopathy ([address]      Cerebral artery occlusion with cerebral infarction ([address]      COPD (chronic obstructive pulmonary disease) ([address]      Depression      Diabetes mellitus ([address]       IDDM    Hx of cardiovascular stress test 3/22/2016     Lexiscan    Hyperlipidemia      Nonrheumatic mitral (valve) insufficiency      Nonrheumatic tricuspid (valve) insufficiency      Sleep apnea       cpap    Smoker       current 1PPD X35yrs            PAST SURGICAL Hx:   Past Surgical History         Past Surgical History:   Procedure Laterality Date    ABDOMEN SURGERY        ABDOMINAL EXPLORATION SURGERY N/A 6-5-16     Excision perforated mid body gastric ulcer, primary closure omentum patch    BACK SURGERY        CARDIAC CATHETERIZATION   05/03/2018     Dr. Alisson Mendoza        CORONARY ARTERY BYPASS GRAFT        DIAGNOSTIC CARDIAC CATH LAB PROCEDURE   05/03/2018     Dr. Mikhail Koo multiple PCIs     KNEE ARTHROPLASTY        TOE AMPUTATION Right 1/23/2021     RIGHT SECOND DIGIT AMPUTATION performed by Scout Jarvis DPM at [address] [place] [address] TRANSESOPHAGEAL ECHOCARDIOGRAM   10/28/2016     Dr. Mikhail Koo r/o embolic source            FAMILY Hx:  Family History         Family History   Problem Relation Age of Onset    Heart Attack Father              HOME MEDICATIONS:  Home Medications           Prior to Admission medications    Medication Sig Start Date End Date Taking?  Authorizing Provider   ENTRESTO 24-26 MG per tablet TAKE HALF A TABLET BY MOUTH TWICE DAILY 3/30/21     Sherryle Mountain, MD   acetaminophen (TYLENOL 8 HOUR ARTHRITIS PAIN) 650 MG extended release tablet Take 650 mg by mouth 2 times daily       Historical Provider, MD   insulin glargine (LANTUS) 100 UNIT/ML injection vial Inject 44 Units into the skin 2 times daily        Historical Provider, MD   Cinnamon 500 MG CAPS Take by mouth daily       Historical Provider, MD   canagliflozin (INVOKANA) 300 MG TABS tablet Take 300 mg by mouth every morning (before breakfast) lunchtime       Historical Provider, MD   Biotin 85983 MCG TABS Take by mouth daily        Historical Provider, MD   zinc 50 MG TABS tablet Take 50 mg by mouth daily       Historical Provider, MD   vitamin B-12 (CYANOCOBALAMIN) 1000 MCG tablet Take 1,000 mcg by mouth daily       Historical Provider, MD   Turmeric (QC TUMERIC COMPLEX) 500 MG CAPS Take by mouth daily        Historical Provider, MD   Ascorbic Acid (VITAMIN C) 1000 MG tablet Take 1,000 mg by mouth daily       Historical Provider, MD   Fluticasone Propionate (FLONASE ALLERGY RELIEF NA) by Nasal route daily        Historical Provider, MD   metoprolol succinate (TOPROL XL) 25 MG extended release tablet Take 1 tablet by mouth daily 9/25/20     Brenda Medrano MD   furosemide (LASIX) 20 MG tablet Take 1 tablet by mouth daily  Patient taking differently: Take 20 mg by mouth every other day  9/25/20     Brenda Medrano MD   Cholecalciferol (VITAMIN D) 125 MCG (5000 UT) CAPS Take 5,000 Units by mouth daily        Historical Provider, MD   pantoprazole (PROTONIX) 40 MG tablet Take 40 mg by mouth daily  6/7/17     Historical Provider, MD   atorvastatin (LIPITOR) 40 MG tablet Take 40 mg by mouth daily  6/7/17     Historical Provider, MD   escitalopram (LEXAPRO) 20 MG tablet Take 20 mg by mouth daily  6/7/17     Historical Provider, MD   ferrous sulfate 325 (65 Fe) MG tablet Take 325 mg by mouth 2 times daily  6/7/17     Historical Provider, MD Pham Locus 200-25 MCG/INH AEPB   6/9/17     Historical Provider, MD   traZODone (DESYREL) 50 MG tablet Take 50 mg by mouth nightly       Historical Provider, MD   gabapentin (NEURONTIN) 300 MG capsule Take 900 mg by mouth daily.        Historical Provider, MD   clopidogrel (PLAVIX) 75 MG tablet Take 75 mg by mouth daily       Historical Provider, MD   albuterol sulfate  (90 BASE) MCG/ACT inhaler Inhale 2 puffs into the lungs every 6 hours as needed for Wheezing       Historical Provider, MD   aspirin 81 MG EC tablet Take 1 tablet by mouth daily 10/28/16     Deidre Sanders MD   metFORMIN (GLUCOPHAGE) 1000 MG tablet Take 1 tablet by mouth 2 times daily (with meals) 10/28/16     Deidre Sanders MD            ALLERGIES:  Ace inhibitors and Percocet [oxycodone-acetaminophen]     SOCIAL Hx:  Social History            OBJECTIVE: No acute distress. See Assessment     Diagnostics:       Telemetry: Reviewed    12 lead EKG: Reviewed        No intake or output data in the 24 hours ending 06/10/21 1548    Labs:   CBC:   Recent Labs     06/10/21  0643   WBC 17.1*   HGB 13.3   HCT 42.3        BMP:   Recent Labs     06/10/21  0643      K 4.9   CO2 22   BUN 47*   CREATININE 1.8*   LABGLOM 29   CALCIUM 9.5     Mag: No results for input(s): MG in the last 72 hours. Phos: No results for input(s): PHOS in the last 72 hours. TSH: No results for input(s): TSH in the last 72 hours. HgA1c:     BNP: No results for input(s): BNP in the last 72 hours. PT/INR: No results for input(s): PROTIME, INR in the last 72 hours. APTT:No results for input(s): APTT in the last 72 hours. CARDIAC ENZYMES:No results for input(s): CKTOTAL, CKMB, CKMBINDEX, TROPONINI in the last 72 hours.   FASTING LIPID PANEL:  Lab Results   Component Value Date    CHOL 127 01/20/2021    HDL 27 01/20/2021    LDLCALC 53 01/20/2021    TRIG 237 01/20/2021     LIVER PROFILE:  Recent Labs     06/10/21  0643   AST 10   ALT 11   LABALBU 4.0       Current Inpatient Medications:   aspirin  81 mg Oral Daily    atorvastatin  40 mg Oral Daily    clopidogrel  75 mg Oral Daily    [Held by provider] sacubitril-valsartan  1 tablet Oral BID    escitalopram  20 mg Oral Daily    ferrous sulfate  325 mg Oral BID    [Held by provider] furosemide  20 mg Oral Daily    gabapentin  400 mg Oral Daily    insulin glargine  44 Units Subcutaneous BID    [Held by provider] metFORMIN  1,000 mg Oral BID WC    metoprolol succinate  25 mg Oral Daily    pantoprazole  40 mg Oral Daily    traZODone  50 mg Oral Nightly    budesonide  0.5 mg Nebulization BID    methylPREDNISolone  40 mg Intravenous Q8H    cefTRIAXone (ROCEPHIN) IV  1,000 mg Intravenous Q24H    doxycycline hyclate  100 mg Oral 2 times per day    Arformoterol Tartrate  15 mcg Nebulization BID    sodium chloride flush  5-40 mL Intravenous 2 times per day    enoxaparin  40 mg Subcutaneous Daily    insulin lispro  0-12 Units Subcutaneous TID WC    insulin lispro  0-6 Units Subcutaneous Nightly       IV Infusions (if any):   sodium chloride      dextrose      sodium chloride 60 mL/hr at 06/10/21 1540         PHYSICAL EXAM:     CONSTITUTIONAL:   BP (!) 117/55   Pulse 84   Temp 98.8 °F (37.1 °C) (Oral)   Resp 16   Ht 5' 9\" (1.753 m)   Wt 215 lb (97.5 kg)   SpO2 97%   BMI 31.75 kg/m²   Pulse  Av.8  Min: 84  Max: 296  Systolic (76PEM), PV , Min:107 , OBI:221    Diastolic (95GAE), WJI:16, Min:53, Max:81    In general, this is a well developed, well nourished who appears stated age. awake, alert, no apparent distress  HEENT: eyes -conjunctivae pink,  Throat - Oral mucosa moist.   Neck-  no stridor, no noted enlargement of the thyroid, no carotid bruit. no jugular venous distention   RESPIRATORY: Chest symmetrical and non-tender to palpation. No accessory muscle use. Lung auscultation - few rhonchi  CARDIOVASCULAR:     Heart Ausculation - Regular rate and rhythm, 2/6 systolic murmur, No s3 or rub.   + lower extremity edema, Right foot has dressing; Distal pulses palpable, no cyanosis   ABDOMEN: Soft, Obese, Bowel sounds present. MS: n/a  : Deferred  Rectal Exam: Deferred  SKIN: warm and dry    NEURO / PSYCH: oriented to person, place        ASSESSMENT/PLAN:     Elevated Troponin - No CP, EKG no ischemia; Likely Type II NSTEMI from CARYL and demand ischemia from  respiratory failure with hypoxia - Lexiscan stress tomorrow due to her known CAD    CAD s/p PCIs, s/p CABG 2018 at [facility] - Continue ASA, BB, Statin    Acute on chronic respiratory failure with hypoxia (HCC)    CARYL/CKD - Continue iv fluids    Chronic HFrEF  - Resume diuretics when renal Fn at her baseline    Ischemic CMP -EF 30-35% in 2018 - Continue BB,  Entresto on hold due to CARYL.  She declined ICD in the past, Echo pending    Tobacco use - Counseled to quit smoking    Mild VHD - Echo pending        Above recommendations discussed with her and her duaghter.         Electronically signed by Janeen Muse MD on 6/10/2021 at 3:48 PM  [facility]) Cardiology

## 2021-06-10 NOTE — H&P
limiting whatsoever. No convulsive disorder. No loss of consciousness. States that she has been constipated x1 day, today. She did move bowels yesterday which were normal without blood or melena. No acute urinary complaints. PAST MEDICAL Hx:  Past Medical History:   Diagnosis Date    Anxiety     CAD (coronary artery disease)     x5 stents    [facility]omy[facility]     Cerebral artery occlusion with cerebral infarction ([facility]     COPD (chronic obstructive pulmonary disease) ([facility]     Depression     Diabetes mellitus ([facility]     IDDM    Hx of cardiovascular stress test 3/22/2016    Lexiscan    Hyperlipidemia     Nonrheumatic mitral (valve) insufficiency     Nonrheumatic tricuspid (valve) insufficiency     Sleep apnea     cpap    Smoker     current 1PPD X35yrs       PAST SURGICAL Hx:   Past Surgical History:   Procedure Laterality Date    ABDOMEN SURGERY      ABDOMINAL EXPLORATION SURGERY N/A 6-5-16    Excision perforated mid body gastric ulcer, primary closure omentum patch    BACK SURGERY      CARDIAC CATHETERIZATION  05/03/2018    Dr. Yuriy Rothman CATH LAB PROCEDURE  05/03/2018    Dr. Mikhail Koo multiple PCIs     KNEE ARTHROPLASTY      TOE AMPUTATION Right 1/23/2021    RIGHT SECOND DIGIT AMPUTATION performed by Scout Jarvis DPM at [address] [place] [address] TRANSESOPHAGEAL ECHOCARDIOGRAM  10/28/2016    Dr. Mikhail Koo r/o embolic source       FAMILY Hx:  Family History   Problem Relation Age of Onset    Heart Attack Father        HOME MEDICATIONS:  Prior to Admission medications    Medication Sig Start Date End Date Taking?  Authorizing Provider   ENTRESTO 24-26 MG per tablet TAKE HALF A TABLET BY MOUTH TWICE DAILY 3/30/21   Sherryle Mountain, MD   acetaminophen (TYLENOL 8 HOUR ARTHRITIS PAIN) 650 MG extended release tablet Take 650 mg by mouth 2 times daily    Historical Provider, MD   insulin glargine (LANTUS) 100 UNIT/ML injection vial Inject 44 Units into the skin 2 times daily     Historical Provider, MD   Cinnamon 500 MG CAPS Take by mouth daily    Historical Provider, MD   canagliflozin (INVOKANA) 300 MG TABS tablet Take 300 mg by mouth every morning (before breakfast) lunchtime    Historical Provider, MD   Biotin 28831 MCG TABS Take by mouth daily     Historical Provider, MD   zinc 50 MG TABS tablet Take 50 mg by mouth daily    Historical Provider, MD   vitamin B-12 (CYANOCOBALAMIN) 1000 MCG tablet Take 1,000 mcg by mouth daily    Historical Provider, MD   Turmeric (QC TUMERIC COMPLEX) 500 MG CAPS Take by mouth daily     Historical Provider, MD   Ascorbic Acid (VITAMIN C) 1000 MG tablet Take 1,000 mg by mouth daily    Historical Provider, MD   Fluticasone Propionate (FLONASE ALLERGY RELIEF NA) by Nasal route daily     Historical Provider, MD   metoprolol succinate (TOPROL XL) 25 MG extended release tablet Take 1 tablet by mouth daily 9/25/20   Mark Anthony Zaldivar MD   furosemide (LASIX) 20 MG tablet Take 1 tablet by mouth daily  Patient taking differently: Take 20 mg by mouth every other day  9/25/20   Mark Anthony Zaldivar MD   Cholecalciferol (VITAMIN D) 125 MCG (5000 UT) CAPS Take 5,000 Units by mouth daily     Historical Provider, MD   pantoprazole (PROTONIX) 40 MG tablet Take 40 mg by mouth daily  6/7/17   Historical Provider, MD   atorvastatin (LIPITOR) 40 MG tablet Take 40 mg by mouth daily  6/7/17   Historical Provider, MD   escitalopram (LEXAPRO) 20 MG tablet Take 20 mg by mouth daily  6/7/17   Historical Provider, MD   ferrous sulfate 325 (65 Fe) MG tablet Take 325 mg by mouth 2 times daily  6/7/17   Historical Provider, MD [place] 200-25 MCG/INH AEPB  6/9/17   Historical Provider, MD   traZODone (DESYREL) 50 MG tablet Take 50 mg by mouth nightly    Historical Provider, MD   gabapentin (NEURONTIN) 300 MG capsule Take 900 mg by mouth daily.      Historical Provider, MD   clopidogrel (PLAVIX) 75 MG tablet Take 75 mg by mouth daily    Historical Provider, MD   albuterol sulfate  (90 BASE) MCG/ACT inhaler Inhale 2 puffs into the lungs every 6 hours as needed for Wheezing    Historical Provider, MD   aspirin 81 MG EC tablet Take 1 tablet by mouth daily 10/28/16   Yoshi Santacruz MD   metFORMIN (GLUCOPHAGE) 1000 MG tablet Take 1 tablet by mouth 2 times daily (with meals) 10/28/16   Yoshi Santacruz MD       ALLERGIES:  Ace inhibitors and Percocet [oxycodone-acetaminophen]    SOCIAL Hx:  Social History     Socioeconomic History    Marital status:      Spouse name: Not on file    Number of children: Not on file    Years of education: Not on file    Highest education level: Not on file   Occupational History    Not on file   Tobacco Use    Smoking status: Current Every Day Smoker     Packs/day: 1.00     Years: 43.00     Pack years: 43.00    Smokeless tobacco: Never Used   Vaping Use    Vaping Use: Never used   Substance and Sexual Activity    Alcohol use: No     Comment: 1 cups of coffee a day    Drug use: No    Sexual activity: Not Currently   Other Topics Concern    Not on file   Social History Narrative    Not on file     Social Determinants of Health     Financial Resource Strain:     Difficulty of Paying Living Expenses:    Food Insecurity:     Worried About Running Out of Food in the Last Year:     Ran Out of Food in the Last Year:    Transportation Needs:     Lack of Transportation (Medical):      Lack of Transportation (Non-Medical):    Physical Activity:     Days of Exercise per Week:     Minutes of Exercise per Session:    Stress:     Feeling of Stress :    Social Connections:     Frequency of Communication with Friends and Family:     Frequency of Social Gatherings with Friends and Family:     Attends [employer] Services:     Active Member of Clubs or Organizations:     Attends Club or Organization Meetings:     Marital Status:    Intimate Partner Violence:     Fear of Current or Ex-Partner:     Emotionally Abused:     Physically Abused:     Sexually Abused:      ROS: 12 point review of symptoms was conducted, pertinent positives and negative were reviewed, aside from that all 12 systems were reviewed and negative. PHYSICAL EXAM:  VITALS:  Vitals:    06/10/21 0933   BP: (!) 165/71   Pulse: 86   Resp: 18   Temp:    SpO2: 97%         CONSTITUTIONAL:    Awake, alert, cooperative, mildly ill-appearing but without distress    EYES:    PERRL, EOMI, sclera clear without icterus, conjunctiva normal    ENT:    Normocephalic, atraumatic, sinuses nontender on palpation. External ears without lesions. Oral pharynx with moist mucus membranes. Tonsils without erythema or exudates. NECK:    Supple, symmetrical, trachea midline, no adenopathy, thyroid symmetric, not enlarged and no tenderness, skin normal, no bruits, no JVD    HEMATOLOGIC/LYMPHATICS:    No cervical lymphadenopathy and no supraclavicular lymphadenopathy    LUNGS:    Symmetric. No increased work of breathing, slightly tachypneic and shallow pattern of respiration. Expiratory wheezing noted without rales or rhonchi. CARDIOVASCULAR:    Normal apical impulse, rhythm with mildly tachycardic rate, C8-I0, systolic murmur    ABDOMEN:    Obese contour, normal bowel sounds, soft, non-distended, non-tender, no masses palpated, no hepatosplenomegaly, no rebound or guarding elicited on palpation     MUSCULOSKELETAL:    There is no redness, warmth, or swelling of the joints. Tone is normal.    NEUROLOGIC:    Awake, alert, oriented to name, place and time. Cranial nerves II-XII are grossly intact. Mild generalized weakness. SKIN:    No bruising or bleeding. No redness, warmth, or swelling    EXTREMITIES:    Peripheral pulses present. No edema, cyanosis, or swelling.     LABORATORY DATA:  CBC with Differential:    Lab Results   Component Value Date    WBC 17.1 06/10/2021    RBC 4.82 06/10/2021    HGB 13.3 The patient's high-sensitivity troponin was elevated and did trend upward suggesting a non-STEMI. Cardiology will provide consultation. Echocardiogram will be updated. Sliding-scale insulin will be added to the patient's basal insulin coverage as well as carbohydrate restricted cardiac diet. Renal toxic medications will be held and gentle fluids will be cautiously administered in the setting of acute kidney injury. Urine indices and urine eosinophils will be assessed. Underlying co-morbidites will be addressed during hospitalization as well. Labs and vital signs will be monitored closely and addressed accordingly. See additional orders for details.      DAVID Melara CNP, DAVID-CNP  11:44 AM  6/10/2021    Electronically signed by DAVID Melara CNP on 6/10/21 at 11:44 AM EDT

## 2021-06-10 NOTE — ED NOTES
Attempted to call report, receiving RN/charge RN not available.  Will call back when able to take report     Adrian Santana RN  06/10/21 3056

## 2021-06-10 NOTE — CARE COORDINATION
Ss note:6/10/2021.2:39 PM Neg covid on 6-10-21. Consult noted for discharge planning. Met with pt & her dtr Mariah Silva [phone]. Resides home alone, 1 story no steps, dtr is very involved & assists pt at home if needed, however PTA pt independent does own home making chores, drives. Pt uses straight cane daily, has ww for longer distances, has w/c, bsc, shower chair, glucometer at home. Pt on 4 liters of oxygen, does NOT have home 02 at this time, has hx of [employer] for oxygen however does NOT like this [employer] company, has CPAP from [place], if home 02 needed use [employer], does note have a nebulizer. Hx of [facility] for home IV in which dtr was taught to administer. No hx of SNF. Family denies need for [facility] at discharge as they can assist pt if needed. Utilizes [facility] in [place]. Plan is home alone, watch for home oxygen need, ? Nebulizer. SW will follow.  ALMAS Nunez

## 2021-06-11 ENCOUNTER — APPOINTMENT (OUTPATIENT)
Dept: NON INVASIVE DIAGNOSTICS | Age: 56
DRG: 193 | End: 2021-06-11
Payer: MEDICARE

## 2021-06-11 ENCOUNTER — APPOINTMENT (OUTPATIENT)
Dept: NUCLEAR MEDICINE | Age: 56
DRG: 193 | End: 2021-06-11
Payer: MEDICARE

## 2021-06-11 LAB
ANION GAP SERPL CALCULATED.3IONS-SCNC: 11 MMOL/L (ref 7–16)
ANION GAP SERPL CALCULATED.3IONS-SCNC: 11 MMOL/L (ref 7–16)
BASOPHILS ABSOLUTE: 0.01 E9/L (ref 0–0.2)
BASOPHILS RELATIVE PERCENT: 0.1 % (ref 0–2)
BUN BLDV-MCNC: 45 MG/DL (ref 6–20)
BUN BLDV-MCNC: 45 MG/DL (ref 6–20)
CALCIUM SERPL-MCNC: 9.1 MG/DL (ref 8.6–10.2)
CALCIUM SERPL-MCNC: 9.3 MG/DL (ref 8.6–10.2)
CHLORIDE BLD-SCNC: 102 MMOL/L (ref 98–107)
CHLORIDE BLD-SCNC: 102 MMOL/L (ref 98–107)
CHOLESTEROL, TOTAL: 151 MG/DL (ref 0–199)
CO2: 18 MMOL/L (ref 22–29)
CO2: 19 MMOL/L (ref 22–29)
CREAT SERPL-MCNC: 1.3 MG/DL (ref 0.5–1)
CREAT SERPL-MCNC: 1.3 MG/DL (ref 0.5–1)
EOSINOPHIL, URINE: 1 % (ref 0–1)
EOSINOPHILS ABSOLUTE: 0 E9/L (ref 0.05–0.5)
EOSINOPHILS RELATIVE PERCENT: 0 % (ref 0–6)
GFR AFRICAN AMERICAN: 51
GFR AFRICAN AMERICAN: 51
GFR NON-AFRICAN AMERICAN: 42 ML/MIN/1.73
GFR NON-AFRICAN AMERICAN: 42 ML/MIN/1.73
GLUCOSE BLD-MCNC: 339 MG/DL (ref 74–99)
GLUCOSE BLD-MCNC: 346 MG/DL (ref 74–99)
HBA1C MFR BLD: 6.7 % (ref 4–5.6)
HCT VFR BLD CALC: 38.3 % (ref 34–48)
HDLC SERPL-MCNC: 33 MG/DL
HEMOGLOBIN: 12.2 G/DL (ref 11.5–15.5)
IMMATURE GRANULOCYTES #: 0.13 E9/L
IMMATURE GRANULOCYTES %: 0.8 % (ref 0–5)
LDL CHOLESTEROL CALCULATED: 70 MG/DL (ref 0–99)
LV EF: 35 %
LVEF MODALITY: NORMAL
LYMPHOCYTES ABSOLUTE: 0.81 E9/L (ref 1.5–4)
LYMPHOCYTES RELATIVE PERCENT: 5 % (ref 20–42)
MAGNESIUM: 1.9 MG/DL (ref 1.6–2.6)
MAGNESIUM: 2 MG/DL (ref 1.6–2.6)
MCH RBC QN AUTO: 27.4 PG (ref 26–35)
MCHC RBC AUTO-ENTMCNC: 31.9 % (ref 32–34.5)
MCV RBC AUTO: 85.9 FL (ref 80–99.9)
METER GLUCOSE: 271 MG/DL (ref 74–99)
METER GLUCOSE: 277 MG/DL (ref 74–99)
METER GLUCOSE: 286 MG/DL (ref 74–99)
MONOCYTES ABSOLUTE: 0.51 E9/L (ref 0.1–0.95)
MONOCYTES RELATIVE PERCENT: 3.1 % (ref 2–12)
NEUTROPHILS ABSOLUTE: 14.9 E9/L (ref 1.8–7.3)
NEUTROPHILS RELATIVE PERCENT: 91 % (ref 43–80)
PDW BLD-RTO: 14.1 FL (ref 11.5–15)
PHOSPHORUS: 2.5 MG/DL (ref 2.5–4.5)
PHOSPHORUS: 2.7 MG/DL (ref 2.5–4.5)
PLATELET # BLD: 340 E9/L (ref 130–450)
PMV BLD AUTO: 10 FL (ref 7–12)
POTASSIUM SERPL-SCNC: 5.5 MMOL/L (ref 3.5–5)
POTASSIUM SERPL-SCNC: 5.5 MMOL/L (ref 3.5–5)
RBC # BLD: 4.46 E12/L (ref 3.5–5.5)
SODIUM BLD-SCNC: 131 MMOL/L (ref 132–146)
SODIUM BLD-SCNC: 132 MMOL/L (ref 132–146)
TRIGL SERPL-MCNC: 241 MG/DL (ref 0–149)
VLDLC SERPL CALC-MCNC: 48 MG/DL
WBC # BLD: 16.4 E9/L (ref 4.5–11.5)

## 2021-06-11 PROCEDURE — 82962 GLUCOSE BLOOD TEST: CPT

## 2021-06-11 PROCEDURE — 2580000003 HC RX 258: Performed by: NURSE PRACTITIONER

## 2021-06-11 PROCEDURE — 83735 ASSAY OF MAGNESIUM: CPT

## 2021-06-11 PROCEDURE — 6370000000 HC RX 637 (ALT 250 FOR IP): Performed by: INTERNAL MEDICINE

## 2021-06-11 PROCEDURE — 94640 AIRWAY INHALATION TREATMENT: CPT

## 2021-06-11 PROCEDURE — 6360000002 HC RX W HCPCS: Performed by: NURSE PRACTITIONER

## 2021-06-11 PROCEDURE — A9500 TC99M SESTAMIBI: HCPCS | Performed by: RADIOLOGY

## 2021-06-11 PROCEDURE — 80048 BASIC METABOLIC PNL TOTAL CA: CPT

## 2021-06-11 PROCEDURE — 6360000004 HC RX CONTRAST MEDICATION: Performed by: NURSE PRACTITIONER

## 2021-06-11 PROCEDURE — 1200000000 HC SEMI PRIVATE

## 2021-06-11 PROCEDURE — 87205 SMEAR GRAM STAIN: CPT

## 2021-06-11 PROCEDURE — 97162 PT EVAL MOD COMPLEX 30 MIN: CPT | Performed by: PHYSICAL THERAPIST

## 2021-06-11 PROCEDURE — 97116 GAIT TRAINING THERAPY: CPT | Performed by: PHYSICAL THERAPIST

## 2021-06-11 PROCEDURE — 97535 SELF CARE MNGMENT TRAINING: CPT

## 2021-06-11 PROCEDURE — 93018 CV STRESS TEST I&R ONLY: CPT | Performed by: INTERNAL MEDICINE

## 2021-06-11 PROCEDURE — C8929 TTE W OR WO FOL WCON,DOPPLER: HCPCS

## 2021-06-11 PROCEDURE — 84100 ASSAY OF PHOSPHORUS: CPT

## 2021-06-11 PROCEDURE — 93016 CV STRESS TEST SUPVJ ONLY: CPT | Performed by: INTERNAL MEDICINE

## 2021-06-11 PROCEDURE — 99232 SBSQ HOSP IP/OBS MODERATE 35: CPT | Performed by: INTERNAL MEDICINE

## 2021-06-11 PROCEDURE — 78452 HT MUSCLE IMAGE SPECT MULT: CPT

## 2021-06-11 PROCEDURE — 36415 COLL VENOUS BLD VENIPUNCTURE: CPT

## 2021-06-11 PROCEDURE — 85025 COMPLETE CBC W/AUTO DIFF WBC: CPT

## 2021-06-11 PROCEDURE — 80061 LIPID PANEL: CPT

## 2021-06-11 PROCEDURE — 6360000002 HC RX W HCPCS: Performed by: INTERNAL MEDICINE

## 2021-06-11 PROCEDURE — 3430000000 HC RX DIAGNOSTIC RADIOPHARMACEUTICAL: Performed by: RADIOLOGY

## 2021-06-11 PROCEDURE — 78452 HT MUSCLE IMAGE SPECT MULT: CPT | Performed by: INTERNAL MEDICINE

## 2021-06-11 PROCEDURE — 93017 CV STRESS TEST TRACING ONLY: CPT

## 2021-06-11 PROCEDURE — 6370000000 HC RX 637 (ALT 250 FOR IP): Performed by: NURSE PRACTITIONER

## 2021-06-11 PROCEDURE — 83036 HEMOGLOBIN GLYCOSYLATED A1C: CPT

## 2021-06-11 PROCEDURE — 97165 OT EVAL LOW COMPLEX 30 MIN: CPT

## 2021-06-11 RX ORDER — SODIUM BICARBONATE 650 MG/1
650 TABLET ORAL 2 TIMES DAILY
Status: DISCONTINUED | OUTPATIENT
Start: 2021-06-11 | End: 2021-06-12 | Stop reason: HOSPADM

## 2021-06-11 RX ORDER — METOPROLOL SUCCINATE 50 MG/1
50 TABLET, EXTENDED RELEASE ORAL DAILY
Status: DISCONTINUED | OUTPATIENT
Start: 2021-06-11 | End: 2021-06-12 | Stop reason: HOSPADM

## 2021-06-11 RX ORDER — METHYLPREDNISOLONE SODIUM SUCCINATE 40 MG/ML
40 INJECTION, POWDER, LYOPHILIZED, FOR SOLUTION INTRAMUSCULAR; INTRAVENOUS EVERY 12 HOURS
Status: DISCONTINUED | OUTPATIENT
Start: 2021-06-11 | End: 2021-06-12 | Stop reason: HOSPADM

## 2021-06-11 RX ADMIN — INSULIN LISPRO 3 UNITS: 100 INJECTION, SOLUTION INTRAVENOUS; SUBCUTANEOUS at 20:19

## 2021-06-11 RX ADMIN — BUDESONIDE 500 MCG: 0.5 INHALANT RESPIRATORY (INHALATION) at 18:17

## 2021-06-11 RX ADMIN — TRAZODONE HYDROCHLORIDE 50 MG: 50 TABLET ORAL at 20:14

## 2021-06-11 RX ADMIN — GABAPENTIN 400 MG: 400 CAPSULE ORAL at 20:14

## 2021-06-11 RX ADMIN — METHYLPREDNISOLONE SODIUM SUCCINATE 40 MG: 40 INJECTION, POWDER, FOR SOLUTION INTRAMUSCULAR; INTRAVENOUS at 08:38

## 2021-06-11 RX ADMIN — ATORVASTATIN CALCIUM 40 MG: 40 TABLET, FILM COATED ORAL at 12:35

## 2021-06-11 RX ADMIN — CEFTRIAXONE SODIUM 1000 MG: 1 INJECTION, POWDER, FOR SOLUTION INTRAMUSCULAR; INTRAVENOUS at 13:00

## 2021-06-11 RX ADMIN — ESCITALOPRAM OXALATE 20 MG: 10 TABLET ORAL at 20:15

## 2021-06-11 RX ADMIN — CLOPIDOGREL BISULFATE 75 MG: 75 TABLET ORAL at 12:34

## 2021-06-11 RX ADMIN — PERFLUTREN 1.5 ML: 6.52 INJECTION, SUSPENSION INTRAVENOUS at 07:48

## 2021-06-11 RX ADMIN — DOXYCYCLINE HYCLATE 100 MG: 100 CAPSULE ORAL at 20:15

## 2021-06-11 RX ADMIN — INSULIN GLARGINE 44 UNITS: 100 INJECTION, SOLUTION SUBCUTANEOUS at 20:19

## 2021-06-11 RX ADMIN — INSULIN LISPRO 6 UNITS: 100 INJECTION, SOLUTION INTRAVENOUS; SUBCUTANEOUS at 17:33

## 2021-06-11 RX ADMIN — ENOXAPARIN SODIUM 40 MG: 40 INJECTION SUBCUTANEOUS at 12:35

## 2021-06-11 RX ADMIN — Medication 10 MILLICURIE: at 07:09

## 2021-06-11 RX ADMIN — REGADENOSON 0.4 MG: 0.08 INJECTION, SOLUTION INTRAVENOUS at 10:48

## 2021-06-11 RX ADMIN — INSULIN LISPRO 6 UNITS: 100 INJECTION, SOLUTION INTRAVENOUS; SUBCUTANEOUS at 12:38

## 2021-06-11 RX ADMIN — METHYLPREDNISOLONE SODIUM SUCCINATE 40 MG: 40 INJECTION, POWDER, FOR SOLUTION INTRAMUSCULAR; INTRAVENOUS at 20:10

## 2021-06-11 RX ADMIN — DOXYCYCLINE HYCLATE 100 MG: 100 CAPSULE ORAL at 12:35

## 2021-06-11 RX ADMIN — SODIUM CHLORIDE: 9 INJECTION, SOLUTION INTRAVENOUS at 08:41

## 2021-06-11 RX ADMIN — ARFORMOTEROL TARTRATE 15 MCG: 15 SOLUTION RESPIRATORY (INHALATION) at 18:17

## 2021-06-11 RX ADMIN — ASPIRIN 81 MG: 81 TABLET, COATED ORAL at 12:34

## 2021-06-11 RX ADMIN — METOPROLOL SUCCINATE 50 MG: 50 TABLET, EXTENDED RELEASE ORAL at 12:38

## 2021-06-11 RX ADMIN — SODIUM CHLORIDE, PRESERVATIVE FREE 10 ML: 5 INJECTION INTRAVENOUS at 20:16

## 2021-06-11 RX ADMIN — Medication 30 MILLICURIE: at 10:33

## 2021-06-11 RX ADMIN — SODIUM BICARBONATE 650 MG TABLET 650 MG: at 20:15

## 2021-06-11 RX ADMIN — INSULIN GLARGINE 44 UNITS: 100 INJECTION, SOLUTION SUBCUTANEOUS at 12:38

## 2021-06-11 RX ADMIN — FERROUS SULFATE TAB 325 MG (65 MG ELEMENTAL FE) 325 MG: 325 (65 FE) TAB at 12:35

## 2021-06-11 ASSESSMENT — PAIN SCALES - GENERAL
PAINLEVEL_OUTOF10: 6
PAINLEVEL_OUTOF10: 0
PAINLEVEL_OUTOF10: 0

## 2021-06-11 NOTE — CONSULTS
Patient seen and examined. Consult dictated. Patient is a 69-year-old lady with a history of type 2 insulin-dependent diabetes mellitus, atherosclerotic heart disease and history of perforated gastric ulcer. Patient now admitted with fatigue and weakness and elevated serum creatinine. Patient has urinary eosinophilia. Patient possibly with underlying acute interstitial nephritis which probably is drug related. A review of the patients medications reveals that the patient is on a proton pump inhibitor as well as a loop diuretic. Both of these agents can be associated with interstitial nephritis although is much more likely with proton pump inhibitor. Presently patient is off diuretic. She is being hydrated. We will also discontinue pantoprazole. Follow urinary output and serum creatinine.  , Thank You for allowing me to participate in the care of this patient. Will follow the patient with you.     Christian Hanson MD  Nephrology    Electronically signed by Tera Gore MD on 6/11/2021 at 2:57 PM

## 2021-06-11 NOTE — CONSULTS
[address]                                  CONSULTATION    PATIENT NAME: Freddy Pastrana                       :        1965  MED REC NO:   [id number]                            ROOM:       6271  ACCOUNT NO:   [de-identified]                           ADMIT DATE: 06/10/2021  PROVIDER:     Horacio Rueda MD    CONSULT DATE:  2021    AGE:  79-year-old. ADMITTING PHYSICIAN:  Tegan Morales DO    REASON FOR CONSULTATION:  Acute kidney injury with possible interstitial  nephritis. HISTORY OF PRESENT ILLNESS:  The patient is being seen in consultation  at the request of Dr. Jered Erickson. The patient is a 79-year-old   lady with underlying history of chronic kidney disease, stage G3a with a  baseline serum creatinine of 1.2 mg/dL to 1.4 mg/dL. The patient  presented to the hospital with chief complaints of shortness of breath,  generalized fatigue, and weakness. She is a somewhat poor historian due  to prior history of CVA. The patient also had more wheezing than usual.  The patient was coughing with scant sputum production. Initial chest  x-ray was unremarkable. The patient did have a CTA of the chest  performed to exclude pulmonary embolism, but it was negative for  pulmonary embolism. Upon presentation, the patient was found to have a  serum creatinine of 1.8 mg/dL yesterday. We do not have any serum  creatinine value for comparison for today. She has been hydrated. Incidentally, her urine studies revealed significant urinary eosinophils  with random urine eosinophil count of 5%. The patient's random urine  sodium was 51 and random urine creatinine of 45. It is of note that the  patient is on diuretics at home. The patient is on pantoprazole for  many years.     PAST MEDICAL HISTORY:  Significant for history of type 2  noninsulin-dependent diabetes mellitus, history of atherosclerotic heart  disease, status post coronary artery stent placement, history of  cardiomyopathy, history of cerebrovascular accident with cerebral  infarction, history of COPD, depression, and hyperlipidemia. She has  history of mitral and tricuspid insufficiency. She has a history of  obstructive sleep apnea. PAST SURGICAL HISTORY:  Significant for cholecystectomy. She has had  coronary artery angioplasty with stent placement. She has had coronary  artery bypass surgery. She has had right toe amputation. She has had  knee arthroplasty. She has had abdominal surgery with exploratory  laparotomy due to have perforated gastric ulcer, requiring primary  closure with omental patch. She has had some form of surgery on her  back. FAMILY HISTORY:  Significant for atherosclerotic heart disease in her  father. SOCIAL HISTORY:  The patient is . She smokes one pack of  cigarettes a day and has done so for last 43 years. She denies any  alcohol use. She drinks one cup of coffee a day. HOME MEDICATIONS:  Include [facility] 24/26 half a tablet twice a day,  Tylenol p.r.n., Lantus insulin 44 units injected on the skin twice a  day, Invokana that is canagliflozin 300 mg q.a.m., Biotin 1000 mcg  daily, zinc 50 mg daily, vitamin B12 one tablet daily, turmeric one  tablet daily, fluticasone propionate nasal spray once daily, metoprolol  succinate 25 mg daily, furosemide 20 mg daily, escitalopram 20 mg daily,  atorvastatin 40 mg daily, pantoprazole 40 mg daily, vitamin D 5000 units  daily, Breo Ellipta one inhalation daily, trazodone 50 mg at bedtime,  gabapentin 300 mg daily, Plavix 75 mg daily, albuterol inhaler 2 puffs  q.6 hours p.r.n., aspirin 81 mg daily, and metformin 1000 mg twice a  day.     CURRENT MEDICATIONS:  In the hospital include normal saline at the rate  of 60 mL an hour, Glucophage 1000 mg twice a day, albuterol inhaler  p.r.n., Pulmicort inhaler 500 mcg twice a day, Brovana nebulization  twice a day, Lantus insulin 44 units twice a day, doxycycline 100 mg  twice a day, Entresto 24/26 twice a day, Humalog insulin on sliding  scale, Lovenox 40 mg subcutaneously daily, aspirin 81 daily, furosemide  20 mg daily, pantoprazole 40 mg daily, metoprolol 50 mg daily,  atorvastatin 40 mg daily, Plavix 75 mg daily, GlycoLax 17 gm daily,  Solu-Medrol 40 mg IV q.12 hours, Rocephin 1 gm IV q.24 hours, Advair  inhaler q.4 hours p.r.n., labetalol 5 mg IV q.4 hours p.r.n. ALLERGIES:  The patient is allergic to ACE INHIBITORS and PERCOCET. PHYSICAL EXAMINATION:  GENERAL:  The patient is awake and alert. She is in no acute distress. She is sitting up in the chair. VITAL SIGNS:  Blood pressure is 126/74, pulse is 87, respiratory rate is  18, temperature 97.7 degrees Fahrenheit, pulse ox saturation is 95% on  room air. HEENT:  Head is normocephalic and atraumatic. Eyes:  Sclerae are  nonicteric. Pupils are equal and reactive to light and accommodation. Fundus examination deferred. Mouth and throat:  Dry oral mucosa without  any mucosal ulceration or exudate. NECK:  Supple. No distention. No carotid bruits. No palpable masses. CHEST:  Symmetrical with well-healed midline surgical scar of  sternotomy. LUNGS:  Vesicular breath sounds. Breath sounds are decreased at the  bases. No rales or rhonchi. HEART:  Regular rate and rhythm without any pericardial rub or gallop. There is a grade 2/6 systolic murmur along the left sternal border. ABDOMEN:  Soft. No rebound tenderness. Normoactive bowel sounds. EXTREMITIES:  The patient has trace to +1 bipedal edema. LABORATORY DATA:  We do not have any labs available from today. Labs  from yesterday, serum creatinine 1.8 mg/dL. Sodium 138 mmol/L,  potassium 4.9 mmol/L, chloride 102 mmol/L, CO2 22 mmol/L. BUN 47 mg/dL,  creatinine 1.8 mg/dL, magnesium 1.7 mg/dL, calcium 9.5 mg/dL, phosphorus  4.0 mg/dL. Hemoglobin 13.3 gm/dL.   Urine studies showed the urine  eosinophil count of 5%. IMPRESSION:  1. Acute kidney injury with possible underlying acute interstitial  nephritis. Acute interstitial nephritis in this patient in all  probability secondary to use of proton pump inhibitors and the proton  pump inhibitor use can be associated with interstitial nephritis and  there is no direct correlation of the duration of use of proton pump  inhibitors and interstitial nephritis. We will hold off the use of  pantoprazole. The patient is also on loop diuretic which can very  rarely be associated with interstitial nephritis. We will get current  set of labs. Follow urinary output and serum creatinine. Hold  pantoprazole. 2.  Hypertension with chronic kidney disease, stage I to stage IV. Blood pressure is at target. We will leave the patient off diuretics  for now. The patient is on Entresto. 3.  Acute interstitial nephritis. The patient with urinary  eosinophilia. We will repeat urinary eosinophil counts. Hold  pantoprazole. No indication to treat interstitial nephritis other than  discontinuation of the offending agent and we will proceed with  alternate therapy if renal function does not improve. The patient  actually is on steroids as well already. 4.  Underlying history of chronic kidney disease, stage G3b believed to  be secondary to microvascular disease with diabetic nephropathy. 5.  Type 2 insulin-dependent diabetes mellitus. The patient advised  importance of good glycemic control and slowing down the progression of diabetic nephropathy. The  patient is already on an ACE inhibitor. PLAN:  Plan is to hold pantoprazole. Diuretics have been put on hold. Rest of plans per orders. Thank you for allowing me to participate in the care of your patient. We will follow the patient with you.         Caren Turner MD    D: 06/11/2021 15:05:40       T: 06/11/2021 18:00:26     [email or web address]  Job#: [id number]     Doc#: [id number]    CC:

## 2021-06-11 NOTE — PROGRESS NOTES
tapping to therapist rhythmn. During rapid alternating movement of hands, patient was unable to successfully perform. Patient also had mild ataxia with finger-nose on left and maximal ataxia on right. Patient had right limb ataxia with overhead movements and used her left arm to assist right overhead. Patient was able to place arms behind neck with increased ataxia, mostly on right. Patient requires continued skilled physical therapy to address concerns listed above for increased safety and function at discharge. PHYSICAL THERAPY  PLAN OF CARE     Physical therapy plan of care is established based on physician order,  patient diagnosis and clinical assessment    Current Treatment Recommendations:    -Gait: Gait training, Standing activities to improve: base of support, weight shift, weight bearing , Exercises to improve trunk control and Exercises to improve hip and knee control   -Endurance: Utilize Supervised activities to increase level of endurance to allow for safe functional mobility including transfers and gait  and Use graduated activities to promote good breathing techniques and provide support and education to maximize respiratory function    PT long term treatment goals are located in below grid    Patient and or family understand(s) diagnosis, prognosis, and plan of care. Frequency of treatments: Patient will be seen  daily.        Prior Level of Function: Patient ambulated with cane , wheeled walker for longer distances  Rehab Potential: good  for baseline    Past medical history:   Past Medical History:   Diagnosis Date    Anxiety     CAD (coronary artery disease)     x5 stents    Cardiomyopathy ([address]     Cerebral artery occlusion with cerebral infarction ([address]     COPD (chronic obstructive pulmonary disease) ([address]     Depression     Diabetes mellitus ([address]     IDDM    Hx of cardiovascular stress test 03/22/2016    Nuclear Lexiscan Stress Test  3/22/2016  6/11/2021    Hyperlipidemia     Nonrheumatic mitral (valve) insufficiency     Nonrheumatic tricuspid (valve) insufficiency     Sleep apnea     cpap    Smoker     current 1PPD X35yrs     Past Surgical History:   Procedure Laterality Date    ABDOMEN SURGERY      ABDOMINAL EXPLORATION SURGERY N/A 6-5-16    Excision perforated mid body gastric ulcer, primary closure omentum patch    BACK SURGERY      CARDIAC CATHETERIZATION  05/03/2018    Dr. Harika White CATH LAB PROCEDURE  05/03/2018    Dr. Ritchie Velasquez multiple PCIs     KNEE ARTHROPLASTY      TOE AMPUTATION Right 1/23/2021    RIGHT SECOND DIGIT AMPUTATION performed by Carlitos Weeks DPM at [address] [place] [address],[facility] TRANSESOPHAGEAL ECHOCARDIOGRAM  10/28/2016    Dr. Ritchie Velasquez r/o embolic source     SUBJECTIVE:  Precautions: Up as tolerated, falls and O2 , chronic productive cough  Social history: Patient lives alone  in a ranch home  with No steps  to enter Tub shower grab bars    Equipment owned: Wheelchair, Curtis Lis, [address], [address] [facility] [employer] [place] chair and glucometer,       [address]   How much difficulty turning over in bed?: A Little  How much difficulty sitting down on / standing up from a chair with arms?: A Little  How much difficulty moving from lying on back to sitting on side of bed?: A Little  How much help from another person moving to and from a bed to a chair?: A Little  How much help from another person needed to walk in hospital room?: A Little  How much help from another person for climbing 3-5 steps with a railing?: A Little  AM-PAC Inpatient Mobility Raw Score : 18  AM-PAC Inpatient T-Scale Score : 43.63  Mobility Inpatient CMS 0-100% Score: 46.58  Mobility Inpatient CMS G-Code Modifier : CK    Nursing cleared patient for PT evaluation.  The admitting diagnosis and active problem list as listed above have been reviewed prior to the verbalized understanding Pt demonstrated skill Pt requires further education in this area   Yes Partial Yes      Treatment:  Patient practiced and was instructed/facilitated in the following treatment: Patient ambulated 2x120 feet in hallway with cane from home with rest break at chair. Patient performed seated exercises in chair prior to walking back to room. Patient participated in coordination activities at chair in room. Therapeutic Exercises:  ankle pumps, long arc quad and seated marching  x  20 reps. At end of session, patient in chair with call light and phone within reach, all lines and tubes intact, nursing notified. Patient would benefit from continued skilled Physical Therapy to improve functional independence and quality of life. Patient's/ family goals   home  with assistance from daughter as needed      Time in  5  Time out  0929    Total Treatment Time  10 minutes    Evaluation time includes thorough review of current medical information, gathering information on past medical history/social history and prior level of function, completion of standardized testing/informal observation of tasks, assessment of data, and development of Plan of care and goals. CPT codes:   Moderate Complexity PT evaluation (24013)  Gait Training (21055) 10 minutes 1 unit(s)    Anat Gonzalez, SPT

## 2021-06-11 NOTE — PROGRESS NOTES
Lexiscan Stress Test:    Reason for study: Elevated Troponin, CAD    Resting EKG showed Sinus rhythm    Patient receive 0.4mg Lexiscan per protocol    Symptoms: No chest pain, No short of breath    EKG:  No ischemia or arrhythmia during Lexiscan infusion. Maximal heart rate  97       Peak /60 mmHg       Post test complications: None      SPECT image report pending.     Electronically signed by Bárbara Price MD on 6/11/2021 at 11:30 AM

## 2021-06-11 NOTE — PROGRESS NOTES
[facility]) [facility]        CARDIOLOGY                 INPATIENT PROGRESS NOTE          PATIENT SEEN IN FOLLOW UP FOR: Elevated Troponin, CMP, CAD    Hospital Day: 2     Rosalina Cifuentes is a 61year old patient known to Dr. Agueda Jolley: Denies any chest pain, Breathing better. No Orthopnea. No palpitations. Seen in the stress lab area    ROS: Review of rest of 10 systems negative except as mentioned above     OBJECTIVE: No acute distress. See Assessment     Diagnostics:       Telemetry: reviewed       No intake or output data in the 24 hours ending 06/11/21 0811    Labs:   CBC:   Recent Labs     06/10/21  0643   WBC 17.1*   HGB 13.3   HCT 42.3        BMP:   Recent Labs     06/10/21  0643      K 4.9   CO2 22   BUN 47*   CREATININE 1.8*   LABGLOM 29   CALCIUM 9.5     Mag: No results for input(s): MG in the last 72 hours. Phos: No results for input(s): PHOS in the last 72 hours. TSH:   Recent Labs     06/10/21  1540   TSH 0.351     HgA1c:     BNP: No results for input(s): BNP in the last 72 hours. PT/INR: No results for input(s): PROTIME, INR in the last 72 hours. APTT:No results for input(s): APTT in the last 72 hours. CARDIAC ENZYMES:No results for input(s): CKTOTAL, CKMB, CKMBINDEX, TROPONINI in the last 72 hours.   FASTING LIPID PANEL:  Lab Results   Component Value Date    CHOL 127 01/20/2021    HDL 27 01/20/2021    LDLCALC 53 01/20/2021    TRIG 237 01/20/2021     LIVER PROFILE:  Recent Labs     06/10/21  0643   AST 10   ALT 11   LABALBU 4.0       Current Inpatient Medications:   aspirin  81 mg Oral Daily    atorvastatin  40 mg Oral Daily    clopidogrel  75 mg Oral Daily    [Held by provider] sacubitril-valsartan  1 tablet Oral BID    [Held by provider] furosemide  20 mg Oral Daily    insulin glargine  44 Units Subcutaneous BID    [Held by provider] metFORMIN  1,000 mg Oral BID WC    metoprolol succinate  25 mg Oral Daily    pantoprazole  40 mg Oral Daily   

## 2021-06-11 NOTE — PROGRESS NOTES
Internal Medicine Progress Note    [facility]    Nolberto Gallo. Tonia Spencer., F.A.CNoeONoeI. Addy Patel D.O., BRENNONOKATRIN Morales, MSN, APRN, NP-C  Julien Nguyeno. Norberto Vidal, MSN, APRN-CNP     Primary Care Physician: Governor Eber MD   Admitting Physician:  Royal Ludwig DO  Admission date and time: 6/10/2021  6:20 AM    Room:  [address] [place] [address]  Admitting diagnosis: Acute on chronic respiratory failure with hypoxia [facility]) [J96.21]    Patient Name: Nii Srinivasan  MRN: [id number]    Date of Service: 6/11/2021     Subjective:  Abraham Cornejo is a 61 y.o. female who was seen and examined today,6/11/2021, at the bedside. She is breathing much better overall and very close to her baseline. She is presently not wearing oxygen and tolerating this well. He has returned from resting images and will return later today for stress testing as per cardiology. We have discussed her abnormal urine eosinophils and nephrology consultation and she is agreeable. She is eager for discharge once this can be arranged. No family present during my examination. Review of System:   Constitutional:   Denies fever or chills, weight loss or gain, improving fatigue/malaise. HEENT:   Denies ear pain, sore throat, sinus or eye problems. Cardiovascular:   Denies any chest pain, irregular heartbeats, or palpitations. Respiratory:   Admits to improving shortness of breath with no resting dyspnea and mild exertional dyspnea. Admits to coughing but denies sputum production, hemoptysis, or wheezing. Gastrointestinal:   Denies nausea, vomiting, diarrhea, or constipation. Denies any abdominal pain. Genitourinary:    Denies any urgency, frequency, hematuria. Voiding  without difficulty. Extremities:   Denies lower extremity swelling, edema or cyanosis.    Neurology:    Denies any headache or focal neurological deficits, positive for mild generalized weakness without focal component  Psch: Denies being anxious or depressed. Musculoskeletal:    Denies  myalgias, joint complaints or back pain. Integumentary:   Denies any rashes, ulcers, or excoriations. Denies bruising. Hematologic/Lymphatic:  Denies bruising or bleeding. Physical Exam:  No intake/output data recorded. Intake/Output Summary (Last 24 hours) at 6/11/2021 0843  Last data filed at 6/11/2021 8241  Gross per 24 hour   Intake 0 ml   Output    Net 0 ml   No intake/output data recorded. Patient Vitals for the past 96 hrs (Last 3 readings):   Weight   06/10/21 1345 215 lb (97.5 kg)   06/10/21 0629 215 lb (97.5 kg)   06/10/21 0620 250 lb (113.4 kg)     Vital Signs:   Blood pressure (!) 155/63, pulse 81, temperature 97.4 °F (36.3 °C), temperature source Oral, resp. rate 18, height 5' 9\" (1.753 m), weight 215 lb (97.5 kg), SpO2 93 %. General appearance:  Alert, responsive, oriented to person, place, and time. Comfortable appearing, no distress. Head:  Normocephalic. No masses, lesions or tenderness. Eyes:  PERRLA. EOMI. Sclera clear. Buccal mucosa moist.  ENT:  Ears normal. Mucosa normal.  Neck:    Supple. Trachea midline. No thyromegaly. No JVD. No bruits. Heart:    Rhythm regular. Rate controlled. S1 and S2.  Lungs:    Symmetrical.  Diminished. Clear to auscultation bilaterally. No wheezes. No rhonchi. No rales. Abdomen:   Soft. Obese. Non-tender. Non-distended. Bowel sounds positive. No organomegaly or masses. No pain on palpation. Extremities:    Peripheral pulses present. No peripheral edema. No ulcers. No cyanosis. No clubbing. Neurologic:    Alert x 3. No focal deficit. Cranial nerves grossly intact. No focal weakness. Psych:   Behavior is normal. Mood appears normal. Speech is not rapid and/or pressured. Musculoskeletal:   No unilateral joint edema, erythema or warmth. Gait not assessed. Integumentary:  No rashes  Skin normal color and texture.   Genitalia/Breast:  Deferred    Medication:  Scheduled Meds:   aspirin  81 mg Oral Daily    atorvastatin  40 mg Oral Daily    clopidogrel  75 mg Oral Daily    [Held by provider] sacubitril-valsartan  1 tablet Oral BID    [Held by provider] furosemide  20 mg Oral Daily    insulin glargine  44 Units Subcutaneous BID    [Held by provider] metFORMIN  1,000 mg Oral BID     metoprolol succinate  25 mg Oral Daily    pantoprazole  40 mg Oral Daily    traZODone  50 mg Oral Nightly    budesonide  0.5 mg Nebulization BID    methylPREDNISolone  40 mg Intravenous Q8H    cefTRIAXone (ROCEPHIN) IV  1,000 mg Intravenous Q24H    doxycycline hyclate  100 mg Oral 2 times per day    Arformoterol Tartrate  15 mcg Nebulization BID    sodium chloride flush  5-40 mL Intravenous 2 times per day    enoxaparin  40 mg Subcutaneous Daily    insulin lispro  0-12 Units Subcutaneous TID WC    insulin lispro  0-6 Units Subcutaneous Nightly    gabapentin  400 mg Oral Nightly    escitalopram  20 mg Oral Nightly    ferrous sulfate  325 mg Oral Daily with breakfast     Continuous Infusions:   sodium chloride      dextrose      sodium chloride 60 mL/hr at 06/11/21 0841       Objective Data:  CBC with Differential:    Lab Results   Component Value Date    WBC 17.1 06/10/2021    RBC 4.82 06/10/2021    HGB 13.3 06/10/2021    HCT 42.3 06/10/2021     06/10/2021    MCV 87.8 06/10/2021    MCH 27.6 06/10/2021    MCHC 31.4 06/10/2021    RDW 14.2 06/10/2021    SEGSPCT 61 03/15/2014    BANDSPCT 5 06/06/2016    METASPCT 1 06/11/2016    LYMPHOPCT 6.3 06/10/2021    MONOPCT 4.0 06/10/2021    BASOPCT 0.2 06/10/2021    MONOSABS 0.68 06/10/2021    LYMPHSABS 1.08 06/10/2021    EOSABS 0.10 06/10/2021    BASOSABS 0.04 06/10/2021     BMP:    Lab Results   Component Value Date     06/10/2021    K 4.9 06/10/2021     06/10/2021    CO2 22 06/10/2021    BUN 47 06/10/2021    LABALBU 4.0 06/10/2021    LABALBU 4.2 06/17/2011    CREATININE 1.8 06/10/2021    CALCIUM 9.5 06/10/2021    GFRAA 35 06/10/2021    LABGLOM 29 06/10/2021    GLUCOSE 189 06/10/2021    GLUCOSE 179 06/17/2011       Wound Documentation:   Wound 04/27/15 Other (Comment) Foot Right #1 rt bottom foot aqc 2/27/15 Pressure stage 2: now diabetic non healing wound [place] 2 july 21 , 2015 (Active)   Number of days: 2236       Incision 06/05/16 Abdomen (Active)   Number of days: 5061       Wound 01/19/21 Toe (Comment  which one) Anterior;Right 2nd toe posterior (Active)   Number of days: 142       Wound 01/19/21 Toe (Comment  which one) Right 2nd toe anterior (Active)   Number of days: 142       Assessment:  · Acute respiratory failure with hypoxia secondary to community-acquired pneumonia  · Acute exacerbation of COPD secondary to pneumonia  · Non-STEMI, type I versus type II with known extensive underlying coronary artery disease and prior coronary artery bypass grafting  · Acute kidney injury superimposed on chronic kidney disease stage IIIa possibly secondary to acute interstitial nephritis in the setting of urine eosinophilia, fractional excretion of sodium is 37% and fractional  excretion of urea 41.3%  · insulin-dependent diabetes mellitus type 2 with HbA1c 6.7%  · Severe peripheral vascular disease with balloon angioplasty performed in January by Dr. Dennis Wallace  · Chronic compensated combined diastolic and systolic congestive heart failure with LVEF 30 to 35%  · Severe apnea on CPAP  · Hyperlipidemia  · Depression    Plan:   Mia Issa is doing better from a breathing standpoint and is currently tolerating being off of oxygen without significant symptom worsening. Stress test will be undertaken today by the cardiovascular team.  We will continue with aggressive respiratory supportive measures and empiric antibiotics while infectious work-up is pending.   In the setting of acute kidney injury, urine indices were obtained and showed a fractional excretion of sodium of 37%, fractional excretion of urea of 41.3% both compatible with intrinsic kidney disease and she has a known history of. Urine eosinophils however are elevated at 5% suggesting acute interstitial nephritis playing a role. She is on chronic PPI therapy but identifies no recent antibiotics or medication changes. Potentially nephrotoxic medications are being withheld. PPI therapy is left in place for nephrology review and the medication adjustments will be made. Metabolic panel from today is pending. Aggressive respiratory supportive measures will remain in place and IV steroids will be de-escalate with plans to transition to oral tomorrow. Chronic comorbidities labs and vital signs are being monitored and addressed accordingly. Continue current therapy. See orders for further plan of care. More than 50% of my  time was spent at the bedside counseling/coordinating care with the patient and/or family with face to face contact. This time was spent reviewing notes and laboratory data as well as instructing and counseling the patient. Time I spent with the family or surrogate(s) is included only if the patient was incapable of providing the necessary information or participating in medical decisions. I also discussed the differential diagnosis and all of the proposed management plans with the patient and individuals accompanying the patient. Giselyenny Chey requires this high level of physician care and nursing on the IMC/Telemetry unit due the complexity of decision management and chance of rapid decline or death. Continued cardiac monitoring and higher level of nursing are required. I am readily available for any further decision-making and intervention.      DAVID Samuel CNP  6/11/2021  8:43 AM

## 2021-06-11 NOTE — PROGRESS NOTES
10/28/2016    Dr. Suzi John r/o embolic source      Precautions:  Fall Risk, hx of CVA with R sided weakness      Assessment of current deficits    [x] Functional mobility  [x]ADLs  [x] Strength               []Cognition    [x] Functional transfers   [x] IADLs         [] Safety Awareness   [x]Endurance    [x] Fine Coordination              [x] Balance      [] Vision/perception   []Sensation     [x]Gross Motor Coordination  [x] ROM  [] Delirium                   [] Motor Control     OT PLAN OF CARE   OT POC based on physician orders, patient diagnosis and results of clinical assessment    Frequency/Duration 1-3 days/wk for 2 weeks PRN     Specific OT Treatment Interventions to include:   * Instruction/training on adapted ADL techniques and AE recommendations to increase functional independence within        precautions  * Training on energy conservation strategies, correct breathing pattern and techniques to improve independence/tolerance for self-care routine  * Functional transfer/mobility training/DME recommendations for increased independence, safety, and fall prevention  * Patient/Family education to increase follow through with safety techniques and functional independence  * Recommendation of environmental modifications for increased safety with functional transfers/mobility and ADLs  * Therapeutic exercise to improve motor endurance, ROM, and functional strength for ADLs/functional transfers  * Therapeutic activities to facilitate/challenge dynamic balance, stand tolerance for increased safety and independence with ADLs  * Positioning to improve skin integrity, interaction with environment and functional independence    Recommended Adaptive Equipment: TBD     Home Living: alone; single family home, 1 story, no steps to enter, tub shower.        Equipment owned: wheeled walker, straight cane, wheelchair, bedside commode, shower chair, grab bars     Prior Level of Function: Independent with ADLs , Independent with IADLs; ambulated cane    Driving: yes  Occupation: not working    Pain Level: 6/10 pain in back and R side, chronic; Nursing notified. Cognition: A&O: 4/4; Follows 2 step directions   Memory: good    Sequencing: good    Problem solving: good    Judgement/safety: good     [facility]   AM-PAC Daily Activity Inpatient   How much help for putting on and taking off regular lower body clothing?: A Little  How much help for Bathing?: A Little  How much help for Toileting?: A Little  How much help for putting on and taking off regular upper body clothing?: A Little  How much help for taking care of personal grooming?: A Little  How much help for eating meals?: None  AM-PAC Inpatient Daily Activity Raw Score: 19  AM-PAC Inpatient ADL T-Scale Score : 40.22  ADL Inpatient CMS 0-100% Score: 42.8  ADL Inpatient CMS G-Code Modifier : CK     Functional Assessment:    Initial Eval Status  Date: 6/11/21 Treatment Status  Date: STGs = LTGs  Time frame: 10-14 days   Feeding Independent   Independent    Grooming Supervision   Independent    UB Dressing Supervision   Independent    LB Dressing Supervision   Independent    Bathing Supervision  Independent    Toileting Supervision for hygiene  Independent    Bed Mobility  N/T   Supine to sit: Independent   Sit to supine: Independent    Functional Transfers Supervision from bedside chair  Supervision for transfer to and from commode. Minimal verbal cues to use grab bar for safe commode transfer.    Independent    Functional Mobility Supervision with cane to and from bathroom and household distances  Modified Hunt    Balance Sitting:     Static: good     Dynamic: good   Standing: fair      Activity Tolerance fair   good    Visual/  Perceptual Glasses: yes, readers                 Hand Dominance: left      AROM (PROM) Strength Additional Info:    RUE  WFL with increased time 3/5 fair  and wfl FMC/dexterity noted during ADL tasks     LUE WFL 4/5 good  and wfl FMC/dexterity noted during ADL tasks       Hearing: [facility]   Sensation:  No c/o numbness or tingling  Tone: WFL   Edema: no    Comments: Upon arrival patient chair. At end of session, patient was chair with call light and phone within reach, all lines and tubes intact. Overall patient demonstrated decreased independence and safety during completion of ADL/functional transfer/mobility tasks. Pt would benefit from continued skilled OT to increase safety and independence with completion of ADL/IADL tasks for functional independence and quality of life. Treatment: OT treatment provided this date includes:    Instruction/training on safety and adapted techniques for completion of ADLs    Instruction/training on safe functional mobility/transfer techniques    Instruction/training on energy conservation/work simplification for completion of ADLs    Rehab Potential: Good for established goals. Patient / Family Goal: return home      Patient and/or family were instructed on functional diagnosis, prognosis/goals and OT plan of care. Demonstrated good understanding. Eval Complexity: Low    Time In: 2:20pm  Time Out: 2:24pm      Min Units   OT Eval Low 73593  X     OT Eval Medium [id number]      OT Eval High 15708      OT Re-Eval [id number]            ADL/Self Care [id number]    Therapeutic Activities 18905       Therapeutic Ex 80352       Orthotic Management 36336       Manual [id number]     Neuro Re-Ed 68047       Non-Billable Time     Total Treatment Time:   8  1      Evaluation Time additionally includes thorough review of current medical information, gathering information on past medical history/social history and prior level of function, interpretation of standardized testing/informal observation of tasks, assessment of data and development of plan of care and goals.         Evaluating OT: Gomez Swann OTR/L; [id number]

## 2021-06-12 VITALS
DIASTOLIC BLOOD PRESSURE: 77 MMHG | HEIGHT: 69 IN | WEIGHT: 215 LBS | BODY MASS INDEX: 31.84 KG/M2 | OXYGEN SATURATION: 92 % | SYSTOLIC BLOOD PRESSURE: 149 MMHG | HEART RATE: 77 BPM | RESPIRATION RATE: 16 BRPM | TEMPERATURE: 98.3 F

## 2021-06-12 LAB
ANION GAP SERPL CALCULATED.3IONS-SCNC: 11 MMOL/L (ref 7–16)
BASOPHILS ABSOLUTE: 0.01 E9/L (ref 0–0.2)
BASOPHILS RELATIVE PERCENT: 0.1 % (ref 0–2)
BUN BLDV-MCNC: 42 MG/DL (ref 6–20)
CALCIUM SERPL-MCNC: 9.3 MG/DL (ref 8.6–10.2)
CHLORIDE BLD-SCNC: 105 MMOL/L (ref 98–107)
CO2: 20 MMOL/L (ref 22–29)
CREAT SERPL-MCNC: 1.2 MG/DL (ref 0.5–1)
EOSINOPHILS ABSOLUTE: 0 E9/L (ref 0.05–0.5)
EOSINOPHILS RELATIVE PERCENT: 0 % (ref 0–6)
GFR AFRICAN AMERICAN: 56
GFR NON-AFRICAN AMERICAN: 46 ML/MIN/1.73
GLUCOSE BLD-MCNC: 348 MG/DL (ref 74–99)
HCT VFR BLD CALC: 38.7 % (ref 34–48)
HEMOGLOBIN: 12.4 G/DL (ref 11.5–15.5)
IMMATURE GRANULOCYTES #: 0.08 E9/L
IMMATURE GRANULOCYTES %: 0.6 % (ref 0–5)
LYMPHOCYTES ABSOLUTE: 1.09 E9/L (ref 1.5–4)
LYMPHOCYTES RELATIVE PERCENT: 7.9 % (ref 20–42)
MAGNESIUM: 1.9 MG/DL (ref 1.6–2.6)
MCH RBC QN AUTO: 27.5 PG (ref 26–35)
MCHC RBC AUTO-ENTMCNC: 32 % (ref 32–34.5)
MCV RBC AUTO: 85.8 FL (ref 80–99.9)
METER GLUCOSE: 293 MG/DL (ref 74–99)
METER GLUCOSE: 325 MG/DL (ref 74–99)
MONOCYTES ABSOLUTE: 0.67 E9/L (ref 0.1–0.95)
MONOCYTES RELATIVE PERCENT: 4.8 % (ref 2–12)
NEUTROPHILS ABSOLUTE: 11.97 E9/L (ref 1.8–7.3)
NEUTROPHILS RELATIVE PERCENT: 86.6 % (ref 43–80)
PDW BLD-RTO: 14.2 FL (ref 11.5–15)
PHOSPHORUS: 2.9 MG/DL (ref 2.5–4.5)
PLATELET # BLD: 336 E9/L (ref 130–450)
PMV BLD AUTO: 10.3 FL (ref 7–12)
POTASSIUM SERPL-SCNC: 5.2 MMOL/L (ref 3.5–5)
RBC # BLD: 4.51 E12/L (ref 3.5–5.5)
SODIUM BLD-SCNC: 136 MMOL/L (ref 132–146)
WBC # BLD: 13.8 E9/L (ref 4.5–11.5)

## 2021-06-12 PROCEDURE — 6370000000 HC RX 637 (ALT 250 FOR IP): Performed by: INTERNAL MEDICINE

## 2021-06-12 PROCEDURE — 85025 COMPLETE CBC W/AUTO DIFF WBC: CPT

## 2021-06-12 PROCEDURE — 84100 ASSAY OF PHOSPHORUS: CPT

## 2021-06-12 PROCEDURE — 83735 ASSAY OF MAGNESIUM: CPT

## 2021-06-12 PROCEDURE — 6360000002 HC RX W HCPCS: Performed by: NURSE PRACTITIONER

## 2021-06-12 PROCEDURE — 6370000000 HC RX 637 (ALT 250 FOR IP): Performed by: NURSE PRACTITIONER

## 2021-06-12 PROCEDURE — 36415 COLL VENOUS BLD VENIPUNCTURE: CPT

## 2021-06-12 PROCEDURE — 99232 SBSQ HOSP IP/OBS MODERATE 35: CPT | Performed by: STUDENT IN AN ORGANIZED HEALTH CARE EDUCATION/TRAINING PROGRAM

## 2021-06-12 PROCEDURE — 80048 BASIC METABOLIC PNL TOTAL CA: CPT

## 2021-06-12 PROCEDURE — 94640 AIRWAY INHALATION TREATMENT: CPT

## 2021-06-12 PROCEDURE — 82962 GLUCOSE BLOOD TEST: CPT

## 2021-06-12 PROCEDURE — 94669 MECHANICAL CHEST WALL OSCILL: CPT

## 2021-06-12 RX ADMIN — INSULIN LISPRO 8 UNITS: 100 INJECTION, SOLUTION INTRAVENOUS; SUBCUTANEOUS at 09:00

## 2021-06-12 RX ADMIN — METHYLPREDNISOLONE SODIUM SUCCINATE 40 MG: 40 INJECTION, POWDER, FOR SOLUTION INTRAMUSCULAR; INTRAVENOUS at 08:59

## 2021-06-12 RX ADMIN — ENOXAPARIN SODIUM 40 MG: 40 INJECTION SUBCUTANEOUS at 08:59

## 2021-06-12 RX ADMIN — INSULIN GLARGINE 44 UNITS: 100 INJECTION, SOLUTION SUBCUTANEOUS at 09:02

## 2021-06-12 RX ADMIN — BUDESONIDE 500 MCG: 0.5 INHALANT RESPIRATORY (INHALATION) at 07:07

## 2021-06-12 RX ADMIN — ASPIRIN 81 MG: 81 TABLET, COATED ORAL at 09:01

## 2021-06-12 RX ADMIN — SODIUM BICARBONATE 650 MG TABLET 650 MG: at 08:59

## 2021-06-12 RX ADMIN — INSULIN LISPRO 6 UNITS: 100 INJECTION, SOLUTION INTRAVENOUS; SUBCUTANEOUS at 13:03

## 2021-06-12 RX ADMIN — ATORVASTATIN CALCIUM 40 MG: 40 TABLET, FILM COATED ORAL at 09:00

## 2021-06-12 RX ADMIN — ALBUTEROL SULFATE 2.5 MG: 2.5 SOLUTION RESPIRATORY (INHALATION) at 07:08

## 2021-06-12 RX ADMIN — FERROUS SULFATE TAB 325 MG (65 MG ELEMENTAL FE) 325 MG: 325 (65 FE) TAB at 09:00

## 2021-06-12 RX ADMIN — DOXYCYCLINE HYCLATE 100 MG: 100 CAPSULE ORAL at 09:00

## 2021-06-12 RX ADMIN — ARFORMOTEROL TARTRATE 15 MCG: 15 SOLUTION RESPIRATORY (INHALATION) at 07:07

## 2021-06-12 RX ADMIN — METOPROLOL SUCCINATE 50 MG: 50 TABLET, EXTENDED RELEASE ORAL at 09:00

## 2021-06-12 RX ADMIN — CLOPIDOGREL BISULFATE 75 MG: 75 TABLET ORAL at 08:59

## 2021-06-12 ASSESSMENT — PAIN SCALES - GENERAL: PAINLEVEL_OUTOF10: 0

## 2021-06-12 NOTE — PROGRESS NOTES
Progress Note  6/12/2021 1:48 PM  Subjective:   Admit Date: 6/10/2021  PCP: Tony Forde MD  Interval History: doing better feels better     Diet: ADULT DIET; Regular; 4 carb choices (60 gm/meal); Low Potassium (Less than 3000 mg/day)    Data:   Scheduled Meds:   metoprolol succinate  50 mg Oral Daily    methylPREDNISolone  40 mg Intravenous Q12H    sodium bicarbonate  650 mg Oral BID    aspirin  81 mg Oral Daily    atorvastatin  40 mg Oral Daily    clopidogrel  75 mg Oral Daily    [Held by provider] sacubitril-valsartan  1 tablet Oral BID    insulin glargine  44 Units Subcutaneous BID    [Held by provider] metFORMIN  1,000 mg Oral BID WC    traZODone  50 mg Oral Nightly    budesonide  0.5 mg Nebulization BID    cefTRIAXone (ROCEPHIN) IV  1,000 mg Intravenous Q24H    doxycycline hyclate  100 mg Oral 2 times per day    Arformoterol Tartrate  15 mcg Nebulization BID    sodium chloride flush  5-40 mL Intravenous 2 times per day    enoxaparin  40 mg Subcutaneous Daily    insulin lispro  0-12 Units Subcutaneous TID WC    insulin lispro  0-6 Units Subcutaneous Nightly    gabapentin  400 mg Oral Nightly    escitalopram  20 mg Oral Nightly    ferrous sulfate  325 mg Oral Daily with breakfast     Continuous Infusions:   sodium chloride      dextrose      sodium chloride 60 mL/hr at 06/12/21 0855     PRN Meds:albuterol, labetalol, magnesium sulfate, sodium phosphate IVPB **OR** sodium phosphate IVPB, potassium chloride **OR** potassium alternative oral replacement **OR** potassium chloride, sodium chloride flush, sodium chloride, ondansetron **OR** ondansetron, acetaminophen **OR** acetaminophen, polyethylene glycol, glucose, dextrose, glucagon (rDNA), dextrose  I/O last 3 completed shifts:   In: 1140 [P.O.:660; I.V.:480]  Out: -   I/O this shift:  In: 240 [P.O.:240]  Out: -     Intake/Output Summary (Last 24 hours) at 6/12/2021 1348  Last data filed at 6/12/2021 0845  Gross per 24 hour   Intake 1140 ml   Output    Net 1140 ml     CBC:   Recent Labs     06/10/21  0643 06/11/21  1425 06/12/21  0633   WBC 17.1* 16.4* 13.8*   HGB 13.3 12.2 12.4    340 336     BMP:    Recent Labs     06/11/21  1425 06/11/21  1527 06/12/21  0633   * 132 136   K 5.5* 5.5* 5.2*    102 105   CO2 18* 19* 20*   BUN 45* 45* 42*   CREATININE 1.3* 1.3* 1.2*   GLUCOSE 346* 339* 348*     Hepatic:   Recent Labs     06/10/21  0643   AST 10   ALT 11   BILITOT 0.3   ALKPHOS 113*     Troponin: No results for input(s): TROPONINI in the last 72 hours. BNP: No results for input(s): BNP in the last 72 hours. Lipids:   Recent Labs     06/11/21  1425   CHOL 151   HDL 33     ABGs: No results found for: PHART, PO2ART, GIU7VDC  INR: No results for input(s): INR in the last 72 hours. -----------------------------------------------------------------  RAD: Echo Complete    Result Date: 6/11/2021  Transthoracic Echocardiography Report (TTE)  Demographics   Patient Name    Maria G Brown      Gender            Female                  [address] [place]  [id number]    Room Number       7607  Number   Account #       [de-identified]   Procedure Date    06/11/2021   Corporate ID                Ordering          Jonna Welsh MD                              Physician   Accession       [id number]  Referring  Number                      Physician   Date of Birth   1965  [address] [facility]   Age             61 year(s)  Interpreting      Nikia 63                              Physician         Physician Cardiology                                                Jonna Welsh MD                               Any Other  Procedure Type of Study   TTE procedure  Procedure Date Date: 06/11/2021 Start: 07:15 AM Study Location: Echo Lab Technical Quality: Adequate visualization Indications:Chest pain.  Patient Status: Routine Height: 69 inches Weight: 215 pounds BSA: 2.13 m^2 BMI: 31.75 kg/m^2 Rhythm: Within normal prior echo from 5/22/2018 no significant changes noted.    Signature   ----------------------------------------------------------------  Electronically signed by Sridhar Higuera MD(Interpreting  physician) on 06/11/2021 03:09 PM  ----------------------------------------------------------------  M-Mode/2D Measurements & Calculations   LV Diastolic    LV Systolic Dimension: 4.9   AV Cusp Separation: 1.8 cmLA  Dimension: 5.9  cm                           Dimension: 4.5 cmAO Root  cm              LV Volume Diastolic: 163.5   Dimension: 3.4 cm  LV FS:17 %      ml  LV PW           LV Volume Systolic: 943.3 ml  Diastolic: 1.2  LV EDV/LV EDV Index: 174.7  cm              ml/82 ml/m^2LV ESV/LV ESV    RV Diastolic Dimension: 3.1  Septum          Index: 113.5 ml/53ml/ m^2    cm  Diastolic: 1.2  EF Calculated: 35 %  cm              LV Mass Index: 143 l/min*m^2  CO: 3.74 l/min                               LA volume/Index: 77.5 ml  CI: 1.76  l/m*m^2         LVOT: 2 cm  LV Mass: 305.45  g  Doppler Measurements & Calculations   MV Peak E-Wave: 0.82 AV Peak Velocity: 1.14 LVOT Peak Velocity: 0.66 m/s  m/s                  m/s                    LVOT Mean Velocity: 0.47 m/s  MV Peak A-Wave: 0.63 AV Peak Gradient: 5.23 LVOT Peak Gradient: 1.7  m/s                  mmHg                   mmHgLVOT Mean Gradient: 1 mmHg  MV E/A Ratio: 1.31   AV Mean Velocity: 0.82  MV Peak Gradient:    m/s  4.7 mmHg             AV Mean Gradient: 3  MV Mean Gradient:    mmHg                   TR Velocity:2.42 m/s  2.3 mmHg             AV VTI: 28.1 cm        TR Gradient:23.35 mmHg  MV Mean Velocity:    AV Area                PV Peak Velocity: 0.89 m/s  0.73 m/s             (Continuity):1.96 cm^2 PV Peak Gradient: 3.14 mmHg  MV Deceleration                             PV Mean Velocity: 0.61 m/s  Time: 206.3 msec     LVOT VTI: 17.5 cm      PV Mean Gradient: 1.6 mmHg  MV P1/2t: 46.8 msec  IVRT: 69.2 msec  MVA by PHT:4.7 cm^2  MV Area  (continuity): 2.4 cm^2  [email or web address]    XR CHEST PORTABLE    Result Date: 6/10/2021  EXAMINATION: ONE XRAY VIEW OF THE CHEST 6/10/2021 7:47 am COMPARISON: 04/30/2018 HISTORY: ORDERING SYSTEM PROVIDED HISTORY: shortness of breath TECHNOLOGIST PROVIDED HISTORY: Reason for exam:->shortness of breath FINDINGS: Heart size is borderline prominent. There has been sternotomy. There is some scarring/atelectasis in the left lower lobe. There are no acute infiltrates or effusions. Scarring/atelectasis in the left lower lobe     CTA PULMONARY W CONTRAST    Result Date: 6/10/2021  EXAMINATION: CTA OF THE CHEST 6/10/2021 10:31 am TECHNIQUE: CTA of the chest was performed after the administration of intravenous contrast.  Multiplanar reformatted images are provided for review. MIP images are provided for review. Dose modulation, iterative reconstruction, and/or weight based adjustment of the mA/kV was utilized to reduce the radiation dose to as low as reasonably achievable. COMPARISON: 06/10/2021 at 0749 hours HISTORY: ORDERING SYSTEM PROVIDED HISTORY: shortness of breath, elevated d dimer TECHNOLOGIST PROVIDED HISTORY: Reason for exam:->shortness of breath, elevated d dimer Decision Support Exception - unselect if not a suspected or confirmed emergency medical condition->Emergency Medical Condition (MA) FINDINGS: MOTION ARTIFACTS IN THE LOWER LOBES LIMITS EVALUATION. Pulmonary Arteries: Central pulmonary arteries are adequately opacified for evaluation. No evidence of central or segmental intraluminal filling defect to suggest pulmonary embolism. Subsegmental vessels cannot be adequately evaluated. Main pulmonary artery is normal in caliber. Mediastinum: No evidence of mediastinal lymphadenopathy. There are small right hilar nodes. Heart is mildly enlarged. .  There is no acute abnormality of the thoracic aorta. There has been sternotomy Lungs/pleura: There is focal collapse and infiltrate in the left lower lobe. There is some atelectasis and scarring in the right lower lobe. Upper Abdomen: Limited images of the upper abdomen are unremarkable. Soft Tissues/Bones: No acute bone or soft tissue abnormality. No evidence of proximal pulmonary embolism. Subsegmental branches cannot be adequately evaluated. Significant area of collapse and infiltrate in the left lower lobe. There are small lower areas of atelectasis and scarring in the right base. NM Cardiac Stress Test Nuclear Imaging    Result Date: 6/11/2021  Indication:  Exertional dyspnea Clinical History:   Patient has a known history of coronary artery disease. IMAGING: Myocardial perfusion imaging was performed at rest 30-35 minutes following the intravenous injection of 8.7 mCi of (Tc-Sestamibi) followed by 10 ml of Normal Saline. At peak exercise, the patient was injected intravenously with 30. 6mCi of (Tc-Sestamibi) followed by 10 ml of Normal Saline. Gated post-stress tomographic imaging was performed 20-25 minutes after stress. FINDINGS: The overall quality of the study was fair. Left ventricular cavity size was noted to be dilated both on the post regadenoson and resting images Rotational analog analysis demonstrated no evidence of motion artifact with significant breast attenuation predominantly involving the anterior, anteroseptal and lateral myocardial segments.  The gated SPECT stress imaging in the short, vertical long, and horizontal long axis demonstrated moderately diminished tracer uptake within the apical portion of the anterolateral, anterolateral and lateral segments post regadenoson images with minimal improvement within the lateral segment at time of resting images Gated SPECT left ventricular ejection fraction was unable to accurately calculated to be on the poor quality of gated images with the anterolateral, lateral and inferolateral segments excluded from imaging. The myocardial perfusion imaging was abnormal with a large moderate intensity fixed anterolateral and apical perfusion abnormality as well as a partially reversible lateral perfusion abnormality with inadequate gating performed to assess wall motion, especially in the face of concomitant attenuation but suggestive of coronary disease with possible ischemia of the circumflex coronary distribution Overall left ventricular systolic function and wall motion was unable to be assessed based on the limited quality of the present gated images. Indeterminate risk pharmacologic myocardial perfusion imaging study based on study quality. Objective:   Vitals: BP (!) 149/77   Pulse 77   Temp 98.3 °F (36.8 °C) (Oral)   Resp 16   Ht 5' 9\" (1.753 m)   Wt 215 lb (97.5 kg)   SpO2 92%   BMI 31.75 kg/m²   General appearance: appears stated age   Skin:  No rashes or lesions  HEENT: Head: Normocephalic, no lesions, without obvious abnormality.   Neck: no adenopathy, no carotid bruit, no JVD, supple, symmetrical, trachea midline and thyroid not enlarged, symmetric, no tenderness/mass/nodules  Lungs: clear to auscultation bilaterally  Heart: regular rate and rhythm, S1, S2 normal, no murmur, click, rub or gallop  Abdomen: soft, non-tender; bowel sounds normal; no masses,  no organomegaly  Extremities: extremities normal, atraumatic, no cyanosis or edema  Neurologic: Mental status: Alert, oriented, thought content appropriate    Assessment:   Patient Active Problem List:     Diabetic ulcer of toe of right foot associated with type 2 diabetes mellitus (HCC)     Perforated bowel ([address]     CAD (coronary artery disease)     NSTEMI (non-ST elevated myocardial infarction) ([address]     Infected surgical wound     Cerebrovascular accident (CVA) due to stenosis of left middle cerebral artery (HCC)     Adhesive capsulitis of right shoulder     Shoulder impingement     Primary osteoarthritis of right knee

## 2021-06-12 NOTE — PROGRESS NOTES
Internal Medicine Progress Note    [facility]    Jesus Persaud. Sandra Ochoa., BRENNONONoeI. Génesis Ramires D.O., KATRIN Oconnell, MSN, APRN, NP-C  Prince Camargo. Homero Lucero, MSN, APRN-CNP     Primary Care Physician: Kasey May MD   Admitting Physician:  Sabine Vincent DO  Admission date and time: 6/10/2021  6:20 AM    Room:  [address] [place] [address]  Admitting diagnosis: Acute on chronic respiratory failure with hypoxia [facility]) [J96.21]    Patient Name: Saulo Patrick  MRN: [id number]    Date of Service: 6/12/2021     Subjective:  Frank Salas is a 61 y.o. female who was seen and examined today,6/12/2021, at the bedside. Jennifer Infante is resting comfortably during my examination today, seated at the bedside chair. Her respiratory status has improved dramatically. Unfortunately, stress test was found to be abnormal yesterday. We discussed this and she states she has been discussing cardiac catheterization with Dr. Sachi Joel as an outpatient. We discussed her downward trending creatinine. She is feeling better overall. She does understand the gravity of the situation. Review of System:   Constitutional:   Denies fever or chills, weight loss or gain, improving fatigue/malaise. HEENT:   Denies ear pain, sore throat, sinus or eye problems. Cardiovascular:   Denies any chest pain, irregular heartbeats, or palpitations. Respiratory:   Substantial improvement in her shortness of breath. She has no coughing or sputum production. She admits to mild dyspnea with exertion. Gastrointestinal:   Denies nausea, vomiting, diarrhea, or constipation. Denies any abdominal pain. Genitourinary:    Denies any urgency, frequency, hematuria. Voiding  without difficulty. Extremities:   Denies lower extremity swelling, edema or cyanosis.    Neurology:    Denies any headache or focal neurological deficits, positive for mild generalized weakness without focal component  Psch:   Denies being anxious or depressed. Musculoskeletal:    Denies  myalgias, joint complaints or back pain. Integumentary:   Denies any rashes, ulcers, or excoriations. Denies bruising. Hematologic/Lymphatic:  Denies bruising or bleeding. Physical Exam:  No intake/output data recorded. Intake/Output Summary (Last 24 hours) at 6/12/2021 0830  Last data filed at 6/11/2021 2256  Gross per 24 hour   Intake 1140 ml   Output    Net 1140 ml   I/O last 3 completed shifts: In: 1140 [P.O.:660; I.V.:480]  Out: -   Patient Vitals for the past 96 hrs (Last 3 readings):   Weight   06/10/21 1345 215 lb (97.5 kg)   06/10/21 0629 215 lb (97.5 kg)   06/10/21 0620 250 lb (113.4 kg)     Vital Signs:   Blood pressure (!) 167/74, pulse 68, temperature 98 °F (36.7 °C), temperature source Oral, resp. rate 18, height 5' 9\" (1.753 m), weight 215 lb (97.5 kg), SpO2 93 %. General appearance:  Alert, responsive, oriented to person, place, and time. Comfortable appearing, no distress. Head:  Normocephalic. No masses, lesions or tenderness. Eyes:  PERRLA. EOMI. Sclera clear. Buccal mucosa moist.  ENT:  Ears normal. Mucosa normal.  Neck:    Supple. Trachea midline. No thyromegaly. No JVD. No bruits. Heart:    Rhythm regular. Rate controlled. S1 and S2.  Lungs:    Symmetrical.  Diminished. Clear to auscultation bilaterally. No wheezes. No rhonchi. No rales. Abdomen:   Soft. Obese. Non-tender. Non-distended. Bowel sounds positive. No organomegaly or masses. No pain on palpation. Extremities:    Peripheral pulses present. No peripheral edema. No ulcers. No cyanosis. No clubbing. Neurologic:    Alert x 3. No focal deficit. Cranial nerves grossly intact. No focal weakness. Psych:   Behavior is normal. Mood appears normal. Speech is not rapid and/or pressured. Musculoskeletal:   No unilateral joint edema, erythema or warmth. Gait not assessed.   Integumentary:  No rashes  Skin normal color and CALCIUM 9.3 06/12/2021    GFRAA 56 06/12/2021    LABGLOM 46 06/12/2021    GLUCOSE 348 06/12/2021    GLUCOSE 179 06/17/2011       Assessment:  1. Acute respiratory failure with hypoxia secondary to community-acquired pneumonia complicated by an exacerbation of COPD  2. NSTEMI with known extensive underlying coronary artery disease and prior coronary artery bypass grafting status post stress test evaluation with abnormal findings  3. Acute kidney injury superimposed on chronic kidney disease stage IIIa possibly secondary to acute interstitial nephritis in the setting of urine eosinophilia  4. Insulin-dependent diabetes mellitus type 2  5. Severe peripheral vascular disease with balloon angioplasty performed in January by Dr. Meg Yo  6. Chronic compensated combined diastolic and systolic congestive heart failure with LVEF 30 to 35%  7. Severe apnea on CPAP  8. Hyperlipidemia  9. Depression    Plan:   Clinically, Carlos Nguyen has improved significantly compared to my examination yesterday. Her respiratory status is stable we will maintain antibiotic therapy for an additional 24 hours. We will also maintain IV corticosteroids and nebulized respiratory medications. The more pressing issue is the patient's abnormal stress test.  She states she had been discussing cardiac catheterization with Dr. Vinayak Dunbar for quite some time. I  am recommending cardiac catheterization during this hospitalization and will discuss this with the cardiovascular team.  The patient's renal function has improved with IV fluid resuscitation and we appreciate the input from the nephrology team as we will need nephrologic clearance for cardiac catheterization. There is likely some degree of interstitial nephritis and potential inciting medications have been discontinued.   Further holding of any anticoagulation therapy as per the cardiovascular team as we prepare for procedural intervention    More than 50% of my  time was spent at the bedside counseling/coordinating care with the patient and/or family with face to face contact. This time was spent reviewing notes and laboratory data as well as instructing and counseling the patient. Time I spent with the family or surrogate(s) is included only if the patient was incapable of providing the necessary information or participating in medical decisions. I also discussed the differential diagnosis and all of the proposed management plans with the patient and individuals accompanying the patient. Meenu Gupta requires this high level of physician care and nursing on the IMC/Telemetry unit due the complexity of decision management and chance of rapid decline or death. Continued cardiac monitoring and higher level of nursing are required. I am readily available for any further decision-making and intervention.      Mick Schmitz DO  6/12/2021  8:30 AM

## 2021-06-12 NOTE — PROGRESS NOTES
Patient insisted on leaving against medical advice. Patient did sign AMA papers. Was reminded to follow up with her cardiologist Monday. Belongings sent home with patient.

## 2021-06-12 NOTE — PROGRESS NOTES
Patient insisting of leaving against medical advice. Call Dr. Nadine Cano for discharged procedure. He did not want patient to leave. He did not order any home medication or continue any hospital medication.

## 2021-06-12 NOTE — PROGRESS NOTES
INPATIENT CARDIOLOGY FOLLOW-UP    Name: Esha Vera    Age: 61 y.o. Date of Admission: 6/10/2021  6:20 AM    Date of Service: 6/12/2021    Interim History:  Nuclear stress imaging shows evidence of anterior lateral infarction and apical infarction with evidence of lateral wall ischemia. Review of Systems:   Cardiac: As per HPI  General: No fever, chills  Pulmonary: As per HPI  HEENT: No visual disturbances, difficult swallowing  GI: No nausea, vomiting  Endocrine: No thyroid disease or DM  Musculoskeletal: GONZÁLES x 4, no focal motor deficits  Skin: Intact, no rashes  Neuro/Psych: No headache or seizures    Problem List:  Patient Active Problem List   Diagnosis    Diabetic ulcer of toe of right foot associated with type 2 diabetes mellitus ([facility]    Perforated bowel ([facility]    CAD (coronary artery disease)    NSTEMI (non-ST elevated myocardial infarction) ([facility]    Infected surgical wound    Cerebrovascular accident (CVA) due to stenosis of left middle cerebral artery (HCC)    Adhesive capsulitis of right shoulder    Shoulder impingement    Primary osteoarthritis of right knee    Cellulitis of foot    Acute respiratory failure (HCC)    Acute on chronic respiratory failure with hypoxia (HCC)    Elevated troponin    Chronic HFrEF (heart failure with reduced ejection fraction) ([facility]    Ischemic cardiomyopathy    CARYL (acute kidney injury) ([facility]       Allergies:   Allergies   Allergen Reactions    Ace Inhibitors Other (See Comments)     cough    Percocet [Oxycodone-Acetaminophen] Other (See Comments)     Hallucinates very badly       Current Medications:  Current Facility-Administered Medications   Medication Dose Route Frequency Provider Last Rate Last Admin    metoprolol succinate (TOPROL XL) extended release tablet 50 mg  50 mg Oral Daily Elray Matter, APRN - CNP   50 mg at 06/12/21 0900    methylPREDNISolone sodium (SOLU-MEDROL) injection 40 mg  40 mg Intravenous Q12H Elray Matter, APRN - CNP mEq Intravenous PRN [facility], APR[facility] - CNP        cefTRIAXone (ROCEPHIN) 1,000 mg in sterile water 10 mL IV syringe  1,000 mg Intravenous Q24H [facility], [facility] - CNP   1,000 mg at 06/11/21 1300    doxycycline hyclate (VIBRAMYCIN) capsule 100 mg  100 mg Oral 2 times per day [facility], APRN - CNP   100 mg at 06/12/21 0900    Arformoterol Tartrate (BROVANA) nebulizer solution 15 mcg  15 mcg Nebulization BID [facility], APRN - CNP   15 mcg at 06/12/21 0707    sodium chloride flush 0.9 % injection 5-40 mL  5-40 mL Intravenous 2 times per day [facility], APRN - CNP   10 mL at 06/11/21 2016    sodium chloride flush 0.9 % injection 5-40 mL  5-40 mL Intravenous PRN Emelia Honour, APRN - CNP        0.9 % sodium chloride infusion  25 mL Intravenous PRN Emelia Honour, APRN - CNP        enoxaparin (LOVENOX) injection 40 mg  40 mg Subcutaneous Daily [facility], APRN - CNP   40 mg at 06/12/21 0859    ondansetron (ZOFRAN-ODT) disintegrating tablet 4 mg  4 mg Oral Q8H PRN [facility], APRN - CNP        Or    ondansetron (ZOFRAN) injection 4 mg  4 mg Intravenous Q6H PRN Emelia [facility]our, APRN - CNP        acetaminophen (TYLENOL) tablet 650 mg  650 mg Oral Q6H PRN [facility], APRN - CNP        Or    acetaminophen (TYLENOL) suppository 650 mg  650 mg Rectal Q6H PRN Emelia our, APRN - CNP        polyethylene glycol (GLYCOLAX) packet 17 g  17 g Oral Daily PRN Emelia Hopkins, APRN - CNP        glucose (GLUTOSE) 40 % oral gel 15 g  15 g Oral PRN Emelia our, APRN - CNP        dextrose 50 % IV solution  12.5 g Intravenous PRN [facility], APRN - CNP        glucagon (rDNA) injection 1 mg  1 mg Intramuscular PRN Emelia Honour, APRN - CNP        dextrose 5 % solution  100 mL/hr Intravenous PRN Emelia Honour, APRN - CNP        insulin lispro (HUMALOG) injection vial 0-12 Units  0-12 Units Subcutaneous TID  DAVID Samuel CNP   8 Units at 06/12/21 0900    insulin lispro (HUMALOG) injection vial 0-6 Units  0-6 Units Subcutaneous Nightly DAVID Abdi - CNP   3 Units at 06/11/21 2019    0.9 % sodium chloride infusion   Intravenous Continuous DAVID Abdi - CNP 60 mL/hr at 06/12/21 0855 Rate Verify at 06/12/21 0855    gabapentin (NEURONTIN) capsule 400 mg  400 mg Oral Nightly Missy Wilkins APRN - CNP   400 mg at 06/11/21 2014    escitalopram (LEXAPRO) tablet 20 mg  20 mg Oral Nightly Missy Wilkins APRN - CNP   20 mg at 06/11/21 2015    ferrous sulfate (IRON 325) tablet 325 mg  325 mg Oral Daily with breakfast Mayela Mariscal DO   325 mg at 06/12/21 0900      sodium chloride      dextrose      sodium chloride 60 mL/hr at 06/12/21 0855       Physical Exam:  BP (!) 149/77   Pulse 77   Temp 98.3 °F (36.8 °C) (Oral)   Resp 16   Ht 5' 9\" (1.753 m)   Wt 215 lb (97.5 kg)   SpO2 92%   BMI 31.75 kg/m²   Wt Readings from Last 3 Encounters:   06/10/21 215 lb (97.5 kg)   02/25/21 244 lb (110.7 kg)   01/19/21 245 lb (111.1 kg)     Appearance: Awake, alert, no acute respiratory distress  Skin: Intact, no rash  Head: Normocephalic, atraumatic  Neck: Supple, no elevated JVP, no carotid bruits  Lungs: Clear to auscultation bilaterally. No wheezes, rales, or rhonchi. Cardiac: Regular rate and rhythm, +S1S2, no murmurs apparent  Abdomen: Soft, nontender, +bowel sounds  Extremities: Moves all extremities x 4, no lower extremity edema  Neurologic: No focal motor deficits apparent, normal mood and affect  Peripheral Pulses: Intact posterior tibial pulses bilaterally    Intake/Output:    Intake/Output Summary (Last 24 hours) at 6/12/2021 1052  Last data filed at 6/11/2021 2256  Gross per 24 hour   Intake 1140 ml   Output    Net 1140 ml     No intake/output data recorded.     Laboratory Tests:  Recent Labs     06/11/21  1425 06/11/21  1527 06/12/21  0633   * 132 136   K 5.5* 5.5* 5.2*    102 105   CO2 18* 19* 20*   BUN 45* 45* 42*   CREATININE 1.3* 1.3* 1.2*   GLUCOSE 346* 339* 348* CALCIUM 9.1 9.3 9.3     Lab Results   Component Value Date    MG 1.9 06/12/2021     Recent Labs     06/10/21  0643   ALKPHOS 113*   ALT 11   AST 10   PROT 7.6   BILITOT 0.3   LABALBU 4.0     Recent Labs     06/10/21  0643 06/11/21  1425 06/12/21  0633   WBC 17.1* 16.4* 13.8*   RBC 4.82 4.46 4.51   HGB 13.3 12.2 12.4   HCT 42.3 38.3 38.7   MCV 87.8 85.9 85.8   MCH 27.6 27.4 27.5   MCHC 31.4* 31.9* 32.0   RDW 14.2 14.1 14.2    340 336   MPV 10.0 10.0 10.3     Lab Results   Component Value Date    CKTOTAL 28 10/25/2016    CKMB <1.0 10/25/2016    TROPONINI 0.01 10/25/2016    TROPONINI <0.01 10/25/2016    TROPONINI <0.01 10/25/2016     Lab Results   Component Value Date    INR 1.0 04/30/2018    INR 1.2 10/25/2016    INR 1.1 04/13/2016    PROTIME 11.0 04/30/2018    PROTIME 12.2 10/25/2016    PROTIME 11.6 04/13/2016     Lab Results   Component Value Date    TSH 0.351 06/10/2021     Lab Results   Component Value Date    LABA1C 6.7 (H) 06/11/2021     No results found for: EAG  Lab Results   Component Value Date    CHOL 151 06/11/2021    CHOL 127 01/20/2021    CHOL 151 10/25/2016     Lab Results   Component Value Date    TRIG 241 (H) 06/11/2021    TRIG 237 (H) 01/20/2021    TRIG 111 10/25/2016     Lab Results   Component Value Date    HDL 33 06/11/2021    HDL 27 01/20/2021    HDL 32 10/25/2016     Lab Results   Component Value Date    LDLCALC 70 06/11/2021    LDLCALC 53 01/20/2021    LDLCALC 97 10/25/2016     Lab Results   Component Value Date    LABVLDL 48 06/11/2021    LABVLDL 47 01/20/2021    LABVLDL 22 10/25/2016     No results found for: CHOLHDLRATIO  Recent Labs     06/10/21  0643   PROBNP 653*       ASSESSMENT / PLAN:  78-year-old female with medical history of coronary artery disease post CABG in 2018 with unknown anatomy to us done in [facility] in [place], history of PCI, ischemic cardiomyopathy ejection fraction 30 to 35% declined ICD in the past, chronic kidney disease baseline creatinine 1.2-1.4, diabetes mellitus on insulin, gastric ulcer, left toe amputation, COPD, smoking, obstructive sleep apnea on CPAP presents with pneumonia and COPD exacerbation requiring oxygen. Patient had elevated troponin with no chest pain. She underwent nuclear stress imaging that showed evidence of anterolateral and apical infarction with evidence of lateral ischemia. Since admission, patient underwent an echocardiogram that showed ejection fraction of 35% with stage II diastolic dysfunction and no significant valve abnormalities. Recommendations  We will attempt to obtain [facility] records of CABG surgery and most recent left heart catheterization. We will plan on left heart catheterization once the patient is doing better from pulmonary and nephrology standpoint. Patient would like to pursue ischemic evaluation by Dr. Kamila Godinez as an outpatient. I think this is reasonable. Follow up with Dr. Kamila Godinez in cardiology clinic after discharge. Increase metoprolol from 50 mg to 100 mg succinate daily. resume Entresto when safe from nephrology standpoint. Continue aspirin, Plavix and atorvastatin 40 mg daily.     Minetta Holstein, MD  [facility]) Cardiology

## 2021-06-29 NOTE — PROGRESS NOTES
 KNEE ARTHROPLASTY      TOE AMPUTATION Right 1/23/2021    RIGHT SECOND DIGIT AMPUTATION performed by Elizabeth Olivera DPM at [facility] TRANSESOPHAGEAL ECHOCARDIOGRAM  10/28/2016    Dr. Krystin Gerber r/o embolic source         Current Outpatient Medications   Medication Sig Dispense Refill    aspirin 81 MG EC tablet Take by mouth      atorvastatin (LIPITOR) 40 MG tablet Take by mouth daily       cilostazol (PLETAL) 100 MG tablet Take by mouth      clopidogrel (PLAVIX) 75 MG tablet Take 75 mg by mouth daily       ergocalciferol (ERGOCALCIFEROL) 1.25 MG (80880 UT) capsule Take by mouth once a week       escitalopram (LEXAPRO) 20 MG tablet Take by mouth      ferrous sulfate (IRON 325) 325 (65 Fe) MG tablet Take by mouth daily (with breakfast)       fluticasone-vilanterol (BREO ELLIPTA) 100-25 MCG/INH AEPB inhaler Inhale into the lungs daily       fluticasone (FLONASE) 50 MCG/ACT nasal spray SHAKE LIQUID AND USE 1 SPRAY IN EACH NOSTRIL EVERY DAY      furosemide (LASIX) 20 MG tablet Take 20 mg by mouth every other day       gabapentin (NEURONTIN) 300 MG capsule Take by mouth. 3 tablets once daily      LANTUS SOLOSTAR 100 UNIT/ML injection pen INJECT 42 UNITS UNDER THE SKIN EVERY MORNING AND 40 UNITS AT SUPPER      metFORMIN (GLUCOPHAGE) 1000 MG tablet Take 1,000 mg by mouth 2 times daily (with meals)       metoprolol succinate (TOPROL XL) 25 MG extended release tablet Take 25 mg by mouth daily       pantoprazole (PROTONIX) 40 MG tablet Take by mouth daily       sacubitril-valsartan (ENTRESTO) 24-26 MG per tablet 0.5 tablets 2 times daily       traZODone (DESYREL) 50 MG tablet Take 50 mg by mouth nightly       albuterol sulfate  (90 BASE) MCG/ACT inhaler Inhale 2 puffs into the lungs every 6 hours as needed for Wheezing       No current facility-administered medications for this visit.          Allergies as of 07/01/2021 - Fully Reviewed 06/12/2021   Allergen Reaction Noted    Ace inhibitors Other personally reviewed the visit EKG with the following interpretation:     EKG -6/10/21 Normal sinus rhythm  Possible Left atrial enlargement  Inferior infarct (cited on or before 06-JUN-2016)  Abnormal ECG  When compared with ECG of 10-RUDDY-2021 06:28,  No significant change was found    ECHO: 6/11/21  Summary   Technically sub-optimal images - Definity Echo contrast used. Left ventricular chamber mildly dilated, 5.9cm. Moderate global hypokinesis, inferobasal akinesis, LV EF 35%. Mild left ventricular concentric hypertrophy noted. Stage II diastolic function. Left atrium is enlarged. Interatrial septum not well visualized but appears intact. Borderline right ventricular enlargement with decreased function, TAPSE   1.3. There is trace to mild mitral regurgitation   No mitral valve prolapse. No hemodynamically significant aortic stenosis. Normal tricuspid valve structure. There is trace tricuspid regurgitation, RVSP 28mmHg. Normal aortic root size. No evidence of pericardial effusion. No intra cardiac mass or thrombus. Compared to prior echo from 5/22/2018 no significant changes noted. Stress Test: 6/11/21   The myocardial perfusion imaging was abnormal with a large moderate   intensity fixed anterolateral and apical perfusion abnormality as well   as a partially reversible lateral perfusion abnormality with   inadequate gating performed to assess wall motion, especially in the   face of concomitant attenuation but suggestive of coronary disease   with possible ischemia of the circumflex coronary distribution       Overall left ventricular systolic function and wall motion was unable   to be assessed based on the limited quality of the present gated   images.       Indeterminate risk pharmacologic myocardial perfusion imaging study   based on study quality. Angiography: 5/3/18 1.   Totally occluded OM stent with a totally occluded left circumflex  artery stent with left-to-right collaterals. 2.  Moderate to severe disease involving the distal left main. 3.  Mild disease involving the right coronary artery. Cardiology Labs: BMP:    Lab Results   Component Value Date     07/20/2021    K 4.4 07/20/2021    K 5.1 07/17/2021     07/20/2021    CO2 25 07/20/2021    BUN 29 07/20/2021    CREATININE 1.4 07/20/2021     CMP:    Lab Results   Component Value Date     07/20/2021    K 4.4 07/20/2021    K 5.1 07/17/2021     07/20/2021    CO2 25 07/20/2021    BUN 29 07/20/2021    CREATININE 1.4 07/20/2021    PROT 6.9 07/20/2021     CBC:    Lab Results   Component Value Date    WBC 8.4 07/20/2021    RBC 4.06 07/20/2021    HGB 11.1 07/20/2021    HCT 33.9 07/20/2021    MCV 83.5 07/20/2021    RDW 14.6 07/20/2021     07/20/2021     PT/INR:  No results found for: PTINR  PT/INR Warfarin:  No components found for: PTPATWAR, PTINRWAR  PTT:    Lab Results   Component Value Date    APTT 27.8 04/30/2018     PTT Heparin:  No components found for: APTTHEP  Magnesium:    Lab Results   Component Value Date    MG 2.0 07/19/2021     TSH:    Lab Results   Component Value Date    TSH 0.842 07/18/2021     TROPONIN:  No components found for: TROP  BNP:    Lab Results   Component Value Date    BNP 65 06/17/2011     FASTING LIPID PANEL:    Lab Results   Component Value Date    CHOL 151 06/11/2021    HDL 33 06/11/2021    TRIG 241 06/11/2021     No orders to display     I have personally reviewed the laboratory, cardiac diagnostic and radiographic testing as outlined above:      IMPRESSION:  1: CAD: Status post CABG  In 2018, we'll obtain records from [facility], abnormal stress test in June of 2021, declined any further cardiac work-up at this point of time              2: Chronic systolic congestive heart failure: Compensated will continue current treatment.                 3: Ischemic cardiomyopathy: Last documented ejection fraction was 35%, declined AICD            4: Hypertension: Controlled, continue current treatment. 5: Mitral valve regurgitation: Mild             6: Tricuspid valve regurgitation:  7: Pulmonary hypertension, unspecified:  Mild          8: Chronic obstructive pulmonary disease, unspecified          9: Cerebral infarction, unspecified             10:  Type 2 diabetes mellitus with other circulatory complication   11: Tobacco abuse: Patient was counseled regarding smoking cessation          RECOMMENDATIONS:   1. Continue current treatment  2. Preventive Cardiology: low salt, low cholesterol diet, daily exercise, cholesterol goal of total cholesterol less than 200, LDL less than 70,adherence to diabetic diet, diabetic medications, smoking cessation were all advised. 3. CHF: Daily weight, use of an extra Lasix for weight gain of more than 2-3 pounds in 24 hours, compliance with diuretics, low salt diet were all advised. 4.  Follow-up with Dr. Heather Reich as scheduled  5. Follow-up with Dr. Liborio Atkinson in 6 months, sooner if symptomatic for any reason     I have reviewed my findings and recommendations with patient    Electronically signed by Larissa Pierson MD on 9/7/2021 at 6:35 PM  NOTE: This report was transcribed using voice recognition software.  Every effort was made to ensure accuracy; however, inadvertent computerized transcription errors may be present

## 2021-07-01 ENCOUNTER — OFFICE VISIT (OUTPATIENT)
Dept: CARDIOLOGY CLINIC | Age: 56
End: 2021-07-01
Payer: MEDICARE

## 2021-07-01 VITALS
HEIGHT: 69 IN | OXYGEN SATURATION: 96 % | WEIGHT: 250 LBS | DIASTOLIC BLOOD PRESSURE: 58 MMHG | BODY MASS INDEX: 37.03 KG/M2 | HEART RATE: 75 BPM | SYSTOLIC BLOOD PRESSURE: 118 MMHG

## 2021-07-01 DIAGNOSIS — I50.22 CHRONIC SYSTOLIC CONGESTIVE HEART FAILURE (HCC): Primary | ICD-10-CM

## 2021-07-01 PROCEDURE — G8427 DOCREV CUR MEDS BY ELIG CLIN: HCPCS | Performed by: INTERNAL MEDICINE

## 2021-07-01 PROCEDURE — 99214 OFFICE O/P EST MOD 30 MIN: CPT | Performed by: INTERNAL MEDICINE

## 2021-07-01 PROCEDURE — G8417 CALC BMI ABV UP PARAM F/U: HCPCS | Performed by: INTERNAL MEDICINE

## 2021-07-01 PROCEDURE — 1111F DSCHRG MED/CURRENT MED MERGE: CPT | Performed by: INTERNAL MEDICINE

## 2021-07-01 PROCEDURE — 3017F COLORECTAL CA SCREEN DOC REV: CPT | Performed by: INTERNAL MEDICINE

## 2021-07-01 PROCEDURE — 4004F PT TOBACCO SCREEN RCVD TLK: CPT | Performed by: INTERNAL MEDICINE

## 2021-07-01 RX ORDER — FLUTICASONE FUROATE AND VILANTEROL 100; 25 UG/1; UG/1
POWDER RESPIRATORY (INHALATION) DAILY
COMMUNITY

## 2021-07-01 RX ORDER — CLOPIDOGREL BISULFATE 75 MG/1
75 TABLET ORAL DAILY
COMMUNITY

## 2021-07-01 RX ORDER — ATORVASTATIN CALCIUM 40 MG/1
TABLET, FILM COATED ORAL NIGHTLY
COMMUNITY

## 2021-07-01 RX ORDER — GABAPENTIN 300 MG/1
CAPSULE ORAL NIGHTLY
COMMUNITY

## 2021-07-01 RX ORDER — FERROUS SULFATE 325(65) MG
TABLET ORAL
COMMUNITY

## 2021-07-01 RX ORDER — ASPIRIN 81 MG/1
TABLET ORAL
COMMUNITY

## 2021-07-01 RX ORDER — TRAZODONE HYDROCHLORIDE 50 MG/1
50 TABLET ORAL NIGHTLY
COMMUNITY

## 2021-07-01 RX ORDER — FLUTICASONE PROPIONATE 50 MCG
SPRAY, SUSPENSION (ML) NASAL
COMMUNITY
Start: 2021-06-08

## 2021-07-01 RX ORDER — CILOSTAZOL 100 MG/1
TABLET ORAL
COMMUNITY
End: 2022-04-14 | Stop reason: ALTCHOICE

## 2021-07-01 RX ORDER — PANTOPRAZOLE SODIUM 40 MG/1
TABLET, DELAYED RELEASE ORAL DAILY
COMMUNITY

## 2021-07-01 RX ORDER — ERGOCALCIFEROL (VITAMIN D2) 1250 MCG
CAPSULE ORAL WEEKLY
COMMUNITY
End: 2022-04-14 | Stop reason: ALTCHOICE

## 2021-07-01 RX ORDER — SACUBITRIL AND VALSARTAN 24; 26 MG/1; MG/1
0.5 TABLET, FILM COATED ORAL 2 TIMES DAILY
COMMUNITY
End: 2022-06-17

## 2021-07-01 RX ORDER — FUROSEMIDE 20 MG/1
20 TABLET ORAL EVERY OTHER DAY
COMMUNITY
End: 2021-09-29

## 2021-07-01 RX ORDER — METOPROLOL SUCCINATE 25 MG/1
25 TABLET, EXTENDED RELEASE ORAL DAILY
COMMUNITY
End: 2021-09-29

## 2021-07-01 RX ORDER — INSULIN GLARGINE 100 [IU]/ML
INJECTION, SOLUTION SUBCUTANEOUS
COMMUNITY
Start: 2021-05-18

## 2021-07-01 RX ORDER — ESCITALOPRAM OXALATE 20 MG/1
TABLET ORAL NIGHTLY
COMMUNITY

## 2021-07-14 ENCOUNTER — HOSPITAL ENCOUNTER (OUTPATIENT)
Age: 56
Discharge: HOME | DRG: 871 | End: 2021-07-14
Payer: MEDICARE

## 2021-07-14 DIAGNOSIS — I50.22 CHRONIC SYSTOLIC CONGESTIVE HEART FAILURE (HCC): ICD-10-CM

## 2021-07-14 LAB
ANION GAP SERPL CALCULATED.3IONS-SCNC: 12 MMOL/L (ref 7–16)
BUN BLDV-MCNC: 24 MG/DL (ref 6–20)
CALCIUM SERPL-MCNC: 9.7 MG/DL (ref 8.6–10.2)
CHLORIDE BLD-SCNC: 100 MMOL/L (ref 98–107)
CO2: 26 MMOL/L (ref 22–29)
CREAT SERPL-MCNC: 1.4 MG/DL (ref 0.5–1)
GFR AFRICAN AMERICAN: 47
GFR NON-AFRICAN AMERICAN: 39 ML/MIN/1.73
GLUCOSE BLD-MCNC: 180 MG/DL (ref 74–99)
POTASSIUM SERPL-SCNC: 5.1 MMOL/L (ref 3.5–5)
SODIUM BLD-SCNC: 138 MMOL/L (ref 132–146)

## 2021-07-14 PROCEDURE — 36415 COLL VENOUS BLD VENIPUNCTURE: CPT

## 2021-07-14 PROCEDURE — 80048 BASIC METABOLIC PNL TOTAL CA: CPT

## 2021-07-17 ENCOUNTER — HOSPITAL ENCOUNTER (INPATIENT)
Age: 56
LOS: 3 days | Discharge: HOME | DRG: 871 | End: 2021-07-20
Attending: EMERGENCY MEDICINE | Admitting: INTERNAL MEDICINE
Payer: MEDICARE

## 2021-07-17 ENCOUNTER — APPOINTMENT (OUTPATIENT)
Dept: CT IMAGING | Age: 56
DRG: 871 | End: 2021-07-17
Payer: MEDICARE

## 2021-07-17 DIAGNOSIS — A41.9 SEPTICEMIA (HCC): ICD-10-CM

## 2021-07-17 DIAGNOSIS — J18.9 PNEUMONIA OF LEFT LOWER LOBE DUE TO INFECTIOUS ORGANISM: ICD-10-CM

## 2021-07-17 DIAGNOSIS — J96.01 ACUTE RESPIRATORY FAILURE WITH HYPOXIA (HCC): Primary | ICD-10-CM

## 2021-07-17 LAB
ALBUMIN SERPL-MCNC: 3.6 G/DL (ref 3.5–5.2)
ALP BLD-CCNC: 100 U/L (ref 35–104)
ALT SERPL-CCNC: 19 U/L (ref 0–32)
ANION GAP SERPL CALCULATED.3IONS-SCNC: 14 MMOL/L (ref 7–16)
AST SERPL-CCNC: 19 U/L (ref 0–31)
B.E.: 0.3 MMOL/L (ref -3–3)
BACTERIA: ABNORMAL /HPF
BASOPHILS ABSOLUTE: 0 E9/L (ref 0–0.2)
BASOPHILS RELATIVE PERCENT: 0.4 % (ref 0–2)
BILIRUB SERPL-MCNC: 0.6 MG/DL (ref 0–1.2)
BILIRUBIN URINE: NEGATIVE
BLOOD, URINE: ABNORMAL
BUN BLDV-MCNC: 39 MG/DL (ref 6–20)
CALCIUM SERPL-MCNC: 8.9 MG/DL (ref 8.6–10.2)
CHLORIDE BLD-SCNC: 100 MMOL/L (ref 98–107)
CLARITY: ABNORMAL
CO2: 23 MMOL/L (ref 22–29)
COHB: 5 % (ref 0–1.5)
COLOR: YELLOW
CREAT SERPL-MCNC: 1.7 MG/DL (ref 0.5–1)
CRITICAL: ABNORMAL
DATE ANALYZED: ABNORMAL
DATE OF COLLECTION: ABNORMAL
EOSINOPHILS ABSOLUTE: 0.14 E9/L (ref 0.05–0.5)
EOSINOPHILS RELATIVE PERCENT: 0.9 % (ref 0–6)
EPITHELIAL CELLS, UA: ABNORMAL /HPF
GFR AFRICAN AMERICAN: 37
GFR NON-AFRICAN AMERICAN: 31 ML/MIN/1.73
GLUCOSE BLD-MCNC: 166 MG/DL (ref 74–99)
GLUCOSE URINE: NEGATIVE MG/DL
HCO3: 24.8 MMOL/L (ref 22–26)
HCT VFR BLD CALC: 38.1 % (ref 34–48)
HEMOGLOBIN: 12.5 G/DL (ref 11.5–15.5)
HHB: 8.7 % (ref 0–5)
KETONES, URINE: NEGATIVE MG/DL
LAB: ABNORMAL
LACTIC ACID, SEPSIS: 1.8 MMOL/L (ref 0.5–1.9)
LEUKOCYTE ESTERASE, URINE: ABNORMAL
LYMPHOCYTES ABSOLUTE: 0.31 E9/L (ref 1.5–4)
LYMPHOCYTES RELATIVE PERCENT: 1.8 % (ref 20–42)
Lab: ABNORMAL
MCH RBC QN AUTO: 27.4 PG (ref 26–35)
MCHC RBC AUTO-ENTMCNC: 32.8 % (ref 32–34.5)
MCV RBC AUTO: 83.6 FL (ref 80–99.9)
METHB: 0.3 % (ref 0–1.5)
MODE: ABNORMAL
MONOCYTES ABSOLUTE: 0.62 E9/L (ref 0.1–0.95)
MONOCYTES RELATIVE PERCENT: 3.6 % (ref 2–12)
NEUTROPHILS ABSOLUTE: 14.48 E9/L (ref 1.8–7.3)
NEUTROPHILS RELATIVE PERCENT: 93.8 % (ref 43–80)
NITRITE, URINE: NEGATIVE
O2 CONTENT: 16 ML/DL
O2 SATURATION: 90.8 % (ref 92–98.5)
O2HB: 86 % (ref 94–97)
OPERATOR ID: 9689
OVALOCYTES: ABNORMAL
PATIENT TEMP: 37 C
PCO2: 39.6 MMHG (ref 35–45)
PDW BLD-RTO: 14.7 FL (ref 11.5–15)
PH BLOOD GAS: 7.41 (ref 7.35–7.45)
PH UA: 6 (ref 5–9)
PLATELET # BLD: 299 E9/L (ref 130–450)
PMV BLD AUTO: 9.6 FL (ref 7–12)
PO2: 54.5 MMHG (ref 75–100)
POIKILOCYTES: ABNORMAL
POLYCHROMASIA: ABNORMAL
POTASSIUM REFLEX MAGNESIUM: 5.1 MMOL/L (ref 3.5–5)
PROTEIN UA: 100 MG/DL
RBC # BLD: 4.56 E12/L (ref 3.5–5.5)
RBC UA: ABNORMAL /HPF (ref 0–2)
SARS-COV-2, NAAT: NOT DETECTED
SODIUM BLD-SCNC: 137 MMOL/L (ref 132–146)
SOURCE, BLOOD GAS: ABNORMAL
SPECIFIC GRAVITY UA: 1.02 (ref 1–1.03)
TARGET CELLS: ABNORMAL
THB: 13.2 G/DL (ref 11.5–16.5)
TIME ANALYZED: 2046
TOTAL PROTEIN: 7.5 G/DL (ref 6.4–8.3)
TROPONIN, HIGH SENSITIVITY: 49 NG/L (ref 0–9)
TROPONIN, HIGH SENSITIVITY: 60 NG/L (ref 0–9)
UROBILINOGEN, URINE: 0.2 E.U./DL
WBC # BLD: 15.4 E9/L (ref 4.5–11.5)
WBC UA: >20 /HPF (ref 0–5)

## 2021-07-17 PROCEDURE — 6360000002 HC RX W HCPCS: Performed by: STUDENT IN AN ORGANIZED HEALTH CARE EDUCATION/TRAINING PROGRAM

## 2021-07-17 PROCEDURE — 96374 THER/PROPH/DIAG INJ IV PUSH: CPT

## 2021-07-17 PROCEDURE — 84484 ASSAY OF TROPONIN QUANT: CPT

## 2021-07-17 PROCEDURE — 87088 URINE BACTERIA CULTURE: CPT

## 2021-07-17 PROCEDURE — 6370000000 HC RX 637 (ALT 250 FOR IP): Performed by: STUDENT IN AN ORGANIZED HEALTH CARE EDUCATION/TRAINING PROGRAM

## 2021-07-17 PROCEDURE — 87150 DNA/RNA AMPLIFIED PROBE: CPT

## 2021-07-17 PROCEDURE — 87449 NOS EACH ORGANISM AG IA: CPT

## 2021-07-17 PROCEDURE — 93005 ELECTROCARDIOGRAM TRACING: CPT | Performed by: STUDENT IN AN ORGANIZED HEALTH CARE EDUCATION/TRAINING PROGRAM

## 2021-07-17 PROCEDURE — 87077 CULTURE AEROBIC IDENTIFY: CPT

## 2021-07-17 PROCEDURE — 87040 BLOOD CULTURE FOR BACTERIA: CPT

## 2021-07-17 PROCEDURE — 83605 ASSAY OF LACTIC ACID: CPT

## 2021-07-17 PROCEDURE — 80053 COMPREHEN METABOLIC PANEL: CPT

## 2021-07-17 PROCEDURE — 6360000004 HC RX CONTRAST MEDICATION: Performed by: RADIOLOGY

## 2021-07-17 PROCEDURE — 81001 URINALYSIS AUTO W/SCOPE: CPT

## 2021-07-17 PROCEDURE — 2580000003 HC RX 258: Performed by: STUDENT IN AN ORGANIZED HEALTH CARE EDUCATION/TRAINING PROGRAM

## 2021-07-17 PROCEDURE — 82805 BLOOD GASES W/O2 SATURATION: CPT

## 2021-07-17 PROCEDURE — 87186 SC STD MICRODIL/AGAR DIL: CPT

## 2021-07-17 PROCEDURE — 87635 SARS-COV-2 COVID-19 AMP PRB: CPT

## 2021-07-17 PROCEDURE — 85025 COMPLETE CBC W/AUTO DIFF WBC: CPT

## 2021-07-17 PROCEDURE — 99285 EMERGENCY DEPT VISIT HI MDM: CPT

## 2021-07-17 PROCEDURE — 71275 CT ANGIOGRAPHY CHEST: CPT

## 2021-07-17 PROCEDURE — 36415 COLL VENOUS BLD VENIPUNCTURE: CPT

## 2021-07-17 PROCEDURE — 2060000000 HC ICU INTERMEDIATE R&B

## 2021-07-17 RX ORDER — SODIUM CHLORIDE 0.9 % (FLUSH) 0.9 %
5-40 SYRINGE (ML) INJECTION PRN
Status: DISCONTINUED | OUTPATIENT
Start: 2021-07-17 | End: 2021-07-17 | Stop reason: SDUPTHER

## 2021-07-17 RX ORDER — ALBUTEROL SULFATE 90 UG/1
2 AEROSOL, METERED RESPIRATORY (INHALATION) EVERY 6 HOURS PRN
Status: DISCONTINUED | OUTPATIENT
Start: 2021-07-17 | End: 2021-07-17 | Stop reason: CLARIF

## 2021-07-17 RX ORDER — CLOPIDOGREL BISULFATE 75 MG/1
75 TABLET ORAL DAILY
Status: DISCONTINUED | OUTPATIENT
Start: 2021-07-18 | End: 2021-07-20 | Stop reason: HOSPADM

## 2021-07-17 RX ORDER — ACETAMINOPHEN 325 MG/1
650 TABLET ORAL EVERY 6 HOURS PRN
Status: DISCONTINUED | OUTPATIENT
Start: 2021-07-17 | End: 2021-07-20 | Stop reason: HOSPADM

## 2021-07-17 RX ORDER — ALBUTEROL SULFATE 2.5 MG/3ML
2.5 SOLUTION RESPIRATORY (INHALATION) EVERY 6 HOURS PRN
Status: DISCONTINUED | OUTPATIENT
Start: 2021-07-17 | End: 2021-07-20 | Stop reason: HOSPADM

## 2021-07-17 RX ORDER — SODIUM CHLORIDE 9 MG/ML
25 INJECTION, SOLUTION INTRAVENOUS PRN
Status: DISCONTINUED | OUTPATIENT
Start: 2021-07-17 | End: 2021-07-20 | Stop reason: HOSPADM

## 2021-07-17 RX ORDER — IPRATROPIUM BROMIDE AND ALBUTEROL SULFATE 2.5; .5 MG/3ML; MG/3ML
1 SOLUTION RESPIRATORY (INHALATION)
Status: DISCONTINUED | OUTPATIENT
Start: 2021-07-18 | End: 2021-07-20 | Stop reason: HOSPADM

## 2021-07-17 RX ORDER — SODIUM CHLORIDE 0.9 % (FLUSH) 0.9 %
5-40 SYRINGE (ML) INJECTION EVERY 12 HOURS SCHEDULED
Status: DISCONTINUED | OUTPATIENT
Start: 2021-07-17 | End: 2021-07-20 | Stop reason: HOSPADM

## 2021-07-17 RX ORDER — METOPROLOL SUCCINATE 25 MG/1
25 TABLET, EXTENDED RELEASE ORAL DAILY
Status: DISCONTINUED | OUTPATIENT
Start: 2021-07-18 | End: 2021-07-20 | Stop reason: HOSPADM

## 2021-07-17 RX ORDER — ASPIRIN 81 MG/1
81 TABLET ORAL DAILY
Status: DISCONTINUED | OUTPATIENT
Start: 2021-07-18 | End: 2021-07-20 | Stop reason: HOSPADM

## 2021-07-17 RX ORDER — ESCITALOPRAM OXALATE 10 MG/1
20 TABLET ORAL NIGHTLY
Status: DISCONTINUED | OUTPATIENT
Start: 2021-07-18 | End: 2021-07-20 | Stop reason: HOSPADM

## 2021-07-17 RX ORDER — ACETAMINOPHEN 500 MG
1000 TABLET ORAL ONCE
Status: COMPLETED | OUTPATIENT
Start: 2021-07-17 | End: 2021-07-17

## 2021-07-17 RX ORDER — SODIUM CHLORIDE 0.9 % (FLUSH) 0.9 %
5-40 SYRINGE (ML) INJECTION EVERY 12 HOURS SCHEDULED
Status: DISCONTINUED | OUTPATIENT
Start: 2021-07-17 | End: 2021-07-17 | Stop reason: SDUPTHER

## 2021-07-17 RX ORDER — SODIUM CHLORIDE 9 MG/ML
25 INJECTION, SOLUTION INTRAVENOUS PRN
Status: DISCONTINUED | OUTPATIENT
Start: 2021-07-17 | End: 2021-07-17 | Stop reason: SDUPTHER

## 2021-07-17 RX ORDER — ACETAMINOPHEN 650 MG/1
650 SUPPOSITORY RECTAL EVERY 6 HOURS PRN
Status: DISCONTINUED | OUTPATIENT
Start: 2021-07-17 | End: 2021-07-20 | Stop reason: HOSPADM

## 2021-07-17 RX ORDER — DEXTROSE MONOHYDRATE 25 G/50ML
12.5 INJECTION, SOLUTION INTRAVENOUS PRN
Status: DISCONTINUED | OUTPATIENT
Start: 2021-07-17 | End: 2021-07-18 | Stop reason: ALTCHOICE

## 2021-07-17 RX ORDER — SODIUM CHLORIDE 0.9 % (FLUSH) 0.9 %
5-40 SYRINGE (ML) INJECTION PRN
Status: DISCONTINUED | OUTPATIENT
Start: 2021-07-17 | End: 2021-07-20 | Stop reason: HOSPADM

## 2021-07-17 RX ORDER — DEXTROSE MONOHYDRATE 50 MG/ML
100 INJECTION, SOLUTION INTRAVENOUS PRN
Status: DISCONTINUED | OUTPATIENT
Start: 2021-07-17 | End: 2021-07-18 | Stop reason: ALTCHOICE

## 2021-07-17 RX ORDER — INSULIN GLARGINE 100 [IU]/ML
40 INJECTION, SOLUTION SUBCUTANEOUS 2 TIMES DAILY
Status: DISCONTINUED | OUTPATIENT
Start: 2021-07-18 | End: 2021-07-20 | Stop reason: HOSPADM

## 2021-07-17 RX ORDER — POLYETHYLENE GLYCOL 3350 17 G/17G
17 POWDER, FOR SOLUTION ORAL DAILY PRN
Status: DISCONTINUED | OUTPATIENT
Start: 2021-07-17 | End: 2021-07-20 | Stop reason: HOSPADM

## 2021-07-17 RX ORDER — FUROSEMIDE 20 MG/1
20 TABLET ORAL EVERY OTHER DAY
Status: DISCONTINUED | OUTPATIENT
Start: 2021-07-19 | End: 2021-07-18

## 2021-07-17 RX ORDER — NICOTINE POLACRILEX 4 MG
15 LOZENGE BUCCAL PRN
Status: DISCONTINUED | OUTPATIENT
Start: 2021-07-17 | End: 2021-07-18 | Stop reason: ALTCHOICE

## 2021-07-17 RX ORDER — PANTOPRAZOLE SODIUM 40 MG/1
40 TABLET, DELAYED RELEASE ORAL
Status: DISCONTINUED | OUTPATIENT
Start: 2021-07-18 | End: 2021-07-20 | Stop reason: HOSPADM

## 2021-07-17 RX ADMIN — Medication 10 ML: at 20:59

## 2021-07-17 RX ADMIN — CEFEPIME 2000 MG: 2 INJECTION, POWDER, FOR SOLUTION INTRAMUSCULAR; INTRAVENOUS at 22:20

## 2021-07-17 RX ADMIN — VANCOMYCIN HYDROCHLORIDE 2000 MG: 10 INJECTION, POWDER, LYOPHILIZED, FOR SOLUTION INTRAVENOUS at 22:53

## 2021-07-17 RX ADMIN — ACETAMINOPHEN 1000 MG: 500 TABLET, COATED ORAL at 20:59

## 2021-07-17 RX ADMIN — IOPAMIDOL 75 ML: 755 INJECTION, SOLUTION INTRAVENOUS at 21:39

## 2021-07-17 ASSESSMENT — ENCOUNTER SYMPTOMS
EYE DISCHARGE: 0
EYE REDNESS: 0
SORE THROAT: 0
NAUSEA: 0
BACK PAIN: 0
ABDOMINAL DISTENTION: 0
VOMITING: 0
SINUS PRESSURE: 0
ABDOMINAL PAIN: 0
SHORTNESS OF BREATH: 0
DIARRHEA: 0
EYE PAIN: 0
WHEEZING: 0
COUGH: 0

## 2021-07-17 ASSESSMENT — PAIN SCALES - GENERAL
PAINLEVEL_OUTOF10: 0
PAINLEVEL_OUTOF10: 0

## 2021-07-17 NOTE — ED NOTES
Bed: 10  Expected date:   Expected time:   Means of arrival:   Comments:  Artur Lopez RN  07/17/21 1953

## 2021-07-18 LAB
ADENOVIRUS BY PCR: NOT DETECTED
ALBUMIN SERPL-MCNC: 3.7 G/DL (ref 3.5–5.2)
ALP BLD-CCNC: 94 U/L (ref 35–104)
ALT SERPL-CCNC: 18 U/L (ref 0–32)
ANION GAP SERPL CALCULATED.3IONS-SCNC: 12 MMOL/L (ref 7–16)
AST SERPL-CCNC: 17 U/L (ref 0–31)
BASOPHILS ABSOLUTE: 0.02 E9/L (ref 0–0.2)
BASOPHILS RELATIVE PERCENT: 0.2 % (ref 0–2)
BILIRUB SERPL-MCNC: 0.4 MG/DL (ref 0–1.2)
BORDETELLA PARAPERTUSSIS BY PCR: NOT DETECTED
BORDETELLA PERTUSSIS BY PCR: NOT DETECTED
BUN BLDV-MCNC: 37 MG/DL (ref 6–20)
CALCIUM SERPL-MCNC: 8.7 MG/DL (ref 8.6–10.2)
CHLAMYDOPHILIA PNEUMONIAE BY PCR: NOT DETECTED
CHLORIDE BLD-SCNC: 100 MMOL/L (ref 98–107)
CO2: 26 MMOL/L (ref 22–29)
CORONAVIRUS 229E BY PCR: NOT DETECTED
CORONAVIRUS HKU1 BY PCR: NOT DETECTED
CORONAVIRUS NL63 BY PCR: NOT DETECTED
CORONAVIRUS OC43 BY PCR: NOT DETECTED
CREAT SERPL-MCNC: 1.8 MG/DL (ref 0.5–1)
EOSINOPHILS ABSOLUTE: 0.04 E9/L (ref 0.05–0.5)
EOSINOPHILS RELATIVE PERCENT: 0.3 % (ref 0–6)
GFR AFRICAN AMERICAN: 35
GFR NON-AFRICAN AMERICAN: 29 ML/MIN/1.73
GLUCOSE BLD-MCNC: 115 MG/DL (ref 74–99)
HBA1C MFR BLD: 7.2 % (ref 4–5.6)
HCT VFR BLD CALC: 39.9 % (ref 34–48)
HEMOGLOBIN: 12.3 G/DL (ref 11.5–15.5)
HUMAN METAPNEUMOVIRUS BY PCR: NOT DETECTED
HUMAN RHINOVIRUS/ENTEROVIRUS BY PCR: NOT DETECTED
IMMATURE GRANULOCYTES #: 0.06 E9/L
IMMATURE GRANULOCYTES %: 0.5 % (ref 0–5)
INFLUENZA A BY PCR: NOT DETECTED
INFLUENZA B BY PCR: NOT DETECTED
LYMPHOCYTES ABSOLUTE: 1.14 E9/L (ref 1.5–4)
LYMPHOCYTES RELATIVE PERCENT: 8.7 % (ref 20–42)
MAGNESIUM: 1.7 MG/DL (ref 1.6–2.6)
MCH RBC QN AUTO: 26.9 PG (ref 26–35)
MCHC RBC AUTO-ENTMCNC: 30.8 % (ref 32–34.5)
MCV RBC AUTO: 87.3 FL (ref 80–99.9)
METER GLUCOSE: 120 MG/DL (ref 74–99)
METER GLUCOSE: 131 MG/DL (ref 74–99)
METER GLUCOSE: 135 MG/DL (ref 74–99)
METER GLUCOSE: 164 MG/DL (ref 74–99)
METER GLUCOSE: 171 MG/DL (ref 74–99)
MONOCYTES ABSOLUTE: 0.47 E9/L (ref 0.1–0.95)
MONOCYTES RELATIVE PERCENT: 3.6 % (ref 2–12)
MYCOPLASMA PNEUMONIAE BY PCR: NOT DETECTED
NEUTROPHILS ABSOLUTE: 11.36 E9/L (ref 1.8–7.3)
NEUTROPHILS RELATIVE PERCENT: 86.7 % (ref 43–80)
PARAINFLUENZA VIRUS 1 BY PCR: NOT DETECTED
PARAINFLUENZA VIRUS 2 BY PCR: NOT DETECTED
PARAINFLUENZA VIRUS 3 BY PCR: NOT DETECTED
PARAINFLUENZA VIRUS 4 BY PCR: NOT DETECTED
PDW BLD-RTO: 14.8 FL (ref 11.5–15)
PHOSPHORUS: 4.6 MG/DL (ref 2.5–4.5)
PLATELET # BLD: 292 E9/L (ref 130–450)
PMV BLD AUTO: 9.7 FL (ref 7–12)
POTASSIUM SERPL-SCNC: 4.8 MMOL/L (ref 3.5–5)
PROCALCITONIN: 7.25 NG/ML (ref 0–0.08)
RBC # BLD: 4.57 E12/L (ref 3.5–5.5)
RESPIRATORY SYNCYTIAL VIRUS BY PCR: NOT DETECTED
SARS-COV-2, PCR: NOT DETECTED
SODIUM BLD-SCNC: 138 MMOL/L (ref 132–146)
T4 FREE: 1.26 NG/DL (ref 0.93–1.7)
TOTAL PROTEIN: 7.3 G/DL (ref 6.4–8.3)
TSH SERPL DL<=0.05 MIU/L-ACNC: 0.84 UIU/ML (ref 0.27–4.2)
WBC # BLD: 13.1 E9/L (ref 4.5–11.5)

## 2021-07-18 PROCEDURE — 87206 SMEAR FLUORESCENT/ACID STAI: CPT

## 2021-07-18 PROCEDURE — 2060000000 HC ICU INTERMEDIATE R&B

## 2021-07-18 PROCEDURE — 36415 COLL VENOUS BLD VENIPUNCTURE: CPT

## 2021-07-18 PROCEDURE — 84439 ASSAY OF FREE THYROXINE: CPT

## 2021-07-18 PROCEDURE — 0202U NFCT DS 22 TRGT SARS-COV-2: CPT

## 2021-07-18 PROCEDURE — 94664 DEMO&/EVAL PT USE INHALER: CPT

## 2021-07-18 PROCEDURE — 84443 ASSAY THYROID STIM HORMONE: CPT

## 2021-07-18 PROCEDURE — 83036 HEMOGLOBIN GLYCOSYLATED A1C: CPT

## 2021-07-18 PROCEDURE — 6370000000 HC RX 637 (ALT 250 FOR IP): Performed by: INTERNAL MEDICINE

## 2021-07-18 PROCEDURE — 2580000003 HC RX 258: Performed by: INTERNAL MEDICINE

## 2021-07-18 PROCEDURE — 84100 ASSAY OF PHOSPHORUS: CPT

## 2021-07-18 PROCEDURE — 6360000002 HC RX W HCPCS: Performed by: INTERNAL MEDICINE

## 2021-07-18 PROCEDURE — 82962 GLUCOSE BLOOD TEST: CPT

## 2021-07-18 PROCEDURE — 80053 COMPREHEN METABOLIC PANEL: CPT

## 2021-07-18 PROCEDURE — 83735 ASSAY OF MAGNESIUM: CPT

## 2021-07-18 PROCEDURE — 85025 COMPLETE CBC W/AUTO DIFF WBC: CPT

## 2021-07-18 PROCEDURE — 2700000000 HC OXYGEN THERAPY PER DAY

## 2021-07-18 PROCEDURE — 84145 PROCALCITONIN (PCT): CPT

## 2021-07-18 PROCEDURE — 87070 CULTURE OTHR SPECIMN AEROBIC: CPT

## 2021-07-18 PROCEDURE — 94640 AIRWAY INHALATION TREATMENT: CPT

## 2021-07-18 RX ORDER — FUROSEMIDE 20 MG/1
20 TABLET ORAL EVERY OTHER DAY
Status: DISCONTINUED | OUTPATIENT
Start: 2021-07-18 | End: 2021-07-20 | Stop reason: HOSPADM

## 2021-07-18 RX ORDER — NICOTINE POLACRILEX 4 MG
15 LOZENGE BUCCAL PRN
Status: DISCONTINUED | OUTPATIENT
Start: 2021-07-18 | End: 2021-07-20 | Stop reason: HOSPADM

## 2021-07-18 RX ORDER — DEXTROSE MONOHYDRATE 25 G/50ML
12.5 INJECTION, SOLUTION INTRAVENOUS PRN
Status: DISCONTINUED | OUTPATIENT
Start: 2021-07-18 | End: 2021-07-20 | Stop reason: HOSPADM

## 2021-07-18 RX ORDER — DEXTROSE MONOHYDRATE 50 MG/ML
100 INJECTION, SOLUTION INTRAVENOUS PRN
Status: DISCONTINUED | OUTPATIENT
Start: 2021-07-18 | End: 2021-07-20 | Stop reason: HOSPADM

## 2021-07-18 RX ORDER — SODIUM CHLORIDE 9 MG/ML
INJECTION, SOLUTION INTRAVENOUS CONTINUOUS
Status: ACTIVE | OUTPATIENT
Start: 2021-07-18 | End: 2021-07-18

## 2021-07-18 RX ORDER — SODIUM CHLORIDE 9 MG/ML
INJECTION, SOLUTION INTRAVENOUS EVERY 12 HOURS
Status: DISCONTINUED | OUTPATIENT
Start: 2021-07-18 | End: 2021-07-20 | Stop reason: HOSPADM

## 2021-07-18 RX ORDER — ONDANSETRON 2 MG/ML
4 INJECTION INTRAMUSCULAR; INTRAVENOUS EVERY 6 HOURS PRN
Status: DISCONTINUED | OUTPATIENT
Start: 2021-07-18 | End: 2021-07-20 | Stop reason: HOSPADM

## 2021-07-18 RX ADMIN — ENOXAPARIN SODIUM 40 MG: 40 INJECTION SUBCUTANEOUS at 08:02

## 2021-07-18 RX ADMIN — CEFEPIME HYDROCHLORIDE 2000 MG: 2 INJECTION, POWDER, FOR SOLUTION INTRAVENOUS at 11:18

## 2021-07-18 RX ADMIN — CEFEPIME HYDROCHLORIDE 2000 MG: 2 INJECTION, POWDER, FOR SOLUTION INTRAVENOUS at 22:15

## 2021-07-18 RX ADMIN — SODIUM CHLORIDE: 9 INJECTION, SOLUTION INTRAVENOUS at 11:17

## 2021-07-18 RX ADMIN — SODIUM CHLORIDE, PRESERVATIVE FREE 10 ML: 5 INJECTION INTRAVENOUS at 08:03

## 2021-07-18 RX ADMIN — FUROSEMIDE 20 MG: 20 TABLET ORAL at 08:03

## 2021-07-18 RX ADMIN — VANCOMYCIN HYDROCHLORIDE 1750 MG: 10 INJECTION, POWDER, LYOPHILIZED, FOR SOLUTION INTRAVENOUS at 22:30

## 2021-07-18 RX ADMIN — ASPIRIN 81 MG: 81 TABLET, COATED ORAL at 08:02

## 2021-07-18 RX ADMIN — METOPROLOL SUCCINATE 25 MG: 25 TABLET, EXTENDED RELEASE ORAL at 08:02

## 2021-07-18 RX ADMIN — ESCITALOPRAM OXALATE 20 MG: 10 TABLET ORAL at 00:40

## 2021-07-18 RX ADMIN — IPRATROPIUM BROMIDE AND ALBUTEROL SULFATE 1 AMPULE: .5; 2.5 SOLUTION RESPIRATORY (INHALATION) at 09:44

## 2021-07-18 RX ADMIN — SODIUM CHLORIDE, PRESERVATIVE FREE 10 ML: 5 INJECTION INTRAVENOUS at 00:43

## 2021-07-18 RX ADMIN — IPRATROPIUM BROMIDE AND ALBUTEROL SULFATE 1 AMPULE: .5; 2.5 SOLUTION RESPIRATORY (INHALATION) at 13:26

## 2021-07-18 RX ADMIN — ESCITALOPRAM OXALATE 20 MG: 10 TABLET ORAL at 20:43

## 2021-07-18 RX ADMIN — IPRATROPIUM BROMIDE AND ALBUTEROL SULFATE 1 AMPULE: .5; 2.5 SOLUTION RESPIRATORY (INHALATION) at 18:30

## 2021-07-18 RX ADMIN — CLOPIDOGREL BISULFATE 75 MG: 75 TABLET ORAL at 08:03

## 2021-07-18 RX ADMIN — INSULIN GLARGINE 40 UNITS: 100 INJECTION, SOLUTION SUBCUTANEOUS at 00:40

## 2021-07-18 RX ADMIN — SODIUM CHLORIDE: 9 INJECTION, SOLUTION INTRAVENOUS at 14:42

## 2021-07-18 RX ADMIN — INSULIN GLARGINE 40 UNITS: 100 INJECTION, SOLUTION SUBCUTANEOUS at 20:43

## 2021-07-18 RX ADMIN — INSULIN GLARGINE 40 UNITS: 100 INJECTION, SOLUTION SUBCUTANEOUS at 08:44

## 2021-07-18 RX ADMIN — IPRATROPIUM BROMIDE AND ALBUTEROL SULFATE 1 AMPULE: .5; 2.5 SOLUTION RESPIRATORY (INHALATION) at 06:28

## 2021-07-18 RX ADMIN — PANTOPRAZOLE SODIUM 40 MG: 40 TABLET, DELAYED RELEASE ORAL at 08:03

## 2021-07-18 ASSESSMENT — PAIN SCALES - GENERAL
PAINLEVEL_OUTOF10: 0
PAINLEVEL_OUTOF10: 0

## 2021-07-18 NOTE — PROGRESS NOTES
Internal Medicine Progress Note    [facility]    Raquel Cobb. Anatoliy Orellana., BRENNONOSYMONE. Felipe Pacheco D.O., CIARA Shook D.O. Keshia Cheek, MSN, APRN, NP-C  Jeanie Arango. Mathieu Lozano, MSN, APRN-CNP     Primary Care Physician: Cady Camacho MD   Admitting Physician:  Duncan Bell DO  Admission date and time: 7/17/2021  7:53 PM    Room:  [address] [place] [address]  Admitting diagnosis: Pneumonia [J18.9]    Patient Name: Meredith Hamilton  MRN: [id number]    Date of Service: 7/18/2021     Subjective:  Nixon Ceja is a 61 y.o. female who was seen and examined today,7/18/2021, at the bedside. Nixon Ceja was evaluated in the presence of her daughter today. She is feeling dramatically better. Work-up in the emergency department revealed sepsis criteria in the setting of underlying urinary tract infection and suspected residual pneumonia. She has since been weaned off nasal cannula oxygen and she is very anxious for discharge home. She is no longer febrile. Review of System:   Constitutional:   No further fever or chills. No further malaise or fatigue. HEENT:   Denies ear pain, sore throat, sinus or eye problems. Cardiovascular:   Denies any chest pain, irregular heartbeats, or palpitations. Respiratory:   Very mild shortness of breath that she describes as chronic in nature. Gastrointestinal:   Denies nausea, vomiting, diarrhea, or constipation. Denies any abdominal pain. Genitourinary:    Does admit to cloudy and foul-smelling urine. Extremities:   Denies lower extremity swelling, edema or cyanosis. Neurology:    Denies any headache or focal neurological deficits, Denies generalized weakness or memory difficulty. Psch:   Denies being anxious or depressed. Musculoskeletal:    Denies  myalgias, joint complaints or back pain. Integumentary:   Denies any rashes, ulcers, or excoriations. Denies bruising. Hematologic/Lymphatic:  Denies bruising or bleeding.     Physical Q4H While awake    aspirin  81 mg Oral Daily    clopidogrel  75 mg Oral Daily    insulin glargine  40 Units Subcutaneous BID    metoprolol succinate  25 mg Oral Daily    pantoprazole  40 mg Oral QAM AC    escitalopram  20 mg Oral Nightly     Continuous Infusions:   sodium chloride      sodium chloride      dextrose      sodium chloride         Objective Data:  CBC with Differential:    Lab Results   Component Value Date    WBC 13.1 07/18/2021    RBC 4.57 07/18/2021    HGB 12.3 07/18/2021    HCT 39.9 07/18/2021     07/18/2021    MCV 87.3 07/18/2021    MCH 26.9 07/18/2021    MCHC 30.8 07/18/2021    RDW 14.8 07/18/2021    SEGSPCT 61 03/15/2014    BANDSPCT 5 06/06/2016    METASPCT 1 06/11/2016    LYMPHOPCT 8.7 07/18/2021    MONOPCT 3.6 07/18/2021    BASOPCT 0.2 07/18/2021    MONOSABS 0.47 07/18/2021    LYMPHSABS 1.14 07/18/2021    EOSABS 0.04 07/18/2021    BASOSABS 0.02 07/18/2021     CMP:    Lab Results   Component Value Date     07/18/2021    K 4.8 07/18/2021    K 5.1 07/17/2021     07/18/2021    CO2 26 07/18/2021    BUN 37 07/18/2021    CREATININE 1.8 07/18/2021    GFRAA 35 07/18/2021    LABGLOM 29 07/18/2021    GLUCOSE 115 07/18/2021    GLUCOSE 179 06/17/2011    PROT 7.3 07/18/2021    LABALBU 3.7 07/18/2021    LABALBU 4.2 06/17/2011    CALCIUM 8.7 07/18/2021    BILITOT 0.4 07/18/2021    ALKPHOS 94 07/18/2021    AST 17 07/18/2021    ALT 18 07/18/2021       Assessment:  1. Sepsis secondary to healthcare associated pneumonia complicated by urinary tract infection  2. Acute respiratory failure with hypoxia  3. Acute on chronic kidney disease stage III  4. Coronary artery disease status post stent and CABG  5. Chronic, compensated systolic congestive heart failure  6. Obstructive sleep apnea  7. Nonoxygen dependent COPD  8. Insulin-dependent diabetes mellitus type 2  9. History of CVA with right-sided deficits and neuropathy  10.  Severe peripheral vascular disease  11. Hyperlipidemia  12. Depression  13. Current tobacco abuse    Plan:   Sabrina Michelle has improved dramatically since initial presentation to the hospital.  She is maintained on empiric IV antibiotic therapy as we await final culture results. Nasal cannula oxygen has been weaned off. She is no longer febrile and no longer meets sepsis criteria. She is extremely anxious for discharge home as she is planning to leave for vacation this Wednesday. We discussed maintaining hospitalization as we await final culture results. There is also been a mild bump in her creatinine. Renal toxic medications will be placed on hold and gentle IV fluid resuscitation will be undertaken. Her daughter was updated extensively at the bedside. Continue current therapy. See orders for further plan of care. More than 50% of my  time was spent at the bedside counseling/coordinating care with the patient and/or family with face to face contact. This time was spent reviewing notes and laboratory data as well as instructing and counseling the patient. Time I spent with the family or surrogate(s) is included only if the patient was incapable of providing the necessary information or participating in medical decisions. I also discussed the differential diagnosis and all of the proposed management plans with the patient and individuals accompanying the patient. Sabrina Michelle requires this high level of physician care and nursing on the IMC/Telemetry unit due the complexity of decision management and chance of rapid decline or death. Continued cardiac monitoring and higher level of nursing are required. I am readily available for any further decision-making and intervention.      Tarik Dennis DO, F.A.C.O.I.  7/18/2021  9:31 AM

## 2021-07-18 NOTE — PROGRESS NOTES
Pharmacy Consultation Note  (Antibiotic Dosing and Monitoring)    Initial consult date: 2021  Consulting physician: Ambrose Gaffney  Drug: Vancomycin  Indication: Pneumonia (CAP)     Age/  Gender Height Weight IBW Dosing weight  Allergy Information   56 y.o./female 5' 9\" (175.3 cm) 250 lb (113.4 kg)     Ideal body weight: 66.2 kg (145 lb 15.1 oz)  Adjusted ideal body weight: 84.3 kg (185 lb 14.2 oz)  111.5  Ace inhibitors and Percocet [oxycodone-acetaminophen]      Temp (24hrs), Av.8 °F (37.7 °C), Min:98.2 °F (36.8 °C), Max:102.5 °F (39.2 °C)          Date  WBC BUN SCr CrCl  (mL/min) Drug/Dose Time   Given Level(s)   (Time) Comments   2021 15.4 39 1.7  49   Vancomycin 2000 mg IV  2253                                          No intake or output data in the 24 hours ending 21 2337    Historical Cultures:  Organism   Date Value Ref Range Status   2021 Staphylococcus aureus (A)  Final     No results for input(s): BC in the last 72 hours. Cultures:  available culture and sensitivity results were reviewed in [facility]    Assessment:  · 61 y.o. female has been initiated Vancomycin.   · Estimated CrCl = 49 mL/min  · Goal trough level = 15-20 mcg/mL    Plan:  · Monitor renal function   · Clinical pharmacy to follow      YAS Hwang [facility] 2021 11:37 PM

## 2021-07-18 NOTE — PROGRESS NOTES
Radiology Procedure Waiver   Name: Serena Heredia  : 1965  MRN: [id number]    Date:  21    Time: 8:52 PM EDT    Benefits of immediately proceeding with Radiology exam(s) without pre-testing outweigh the risks or are not indicated as specified below and therefore the following is/are being waived:    [] Pregnancy test   [] Patients LMP on-time and regular.   [] Patient had Tubal Ligation or has other Contraception Device. [] Patient  is Menopausal or Premenarcheal.    [] Patient had Full or Partial Hysterectomy. [] Protocol for Iodine allergy    [] MRI Questionnaire     [x] BUN/Creatinine   [] Patient age w/no hx of renal dysfunction. [] Patient on Dialysis. [] Recent Normal Labs.   Electronically signed by Todd Gary DO on 21 at 8:52 PM EDT

## 2021-07-18 NOTE — PROGRESS NOTES
Pharmacy Consultation Note  (Antibiotic Dosing and Monitoring)    Initial consult date: 2021  Consulting physician: Pamela Cuellar  Drug: Vancomycin  Indication: Pneumonia (CAP)     Age/  Gender Height Weight IBW Dosing weight  Allergy Information   56 y.o./female 5' 9\" (175.3 cm) 250 lb (113.4 kg)     Ideal body weight: 66.2 kg (145 lb 15.1 oz)  Adjusted ideal body weight: 84.3 kg (185 lb 14.2 oz)  111.5  Ace inhibitors and Percocet [oxycodone-acetaminophen]      Temp (24hrs), Av.8 °F (37.7 °C), Min:98.2 °F (36.8 °C), Max:102.5 °F (39.2 °C)          Recent vancomycin administrations                   vancomycin 2000 mg in dextrose 5% 500 ml IVPB (mg) 2,000 mg New Bag 21 4831                No intake or output data in the 24 hours ending 21 0717    Historical Cultures:  Organism   Date Value Ref Range Status   2021 Staphylococcus aureus (A)  Final     No results for input(s): BC in the last 72 hours. Cultures:  available culture and sensitivity results were reviewed in [facility]    Assessment:  · 61 y.o. female has been initiated Vancomycin 2/2 UTI vs pneumonia. Pharmacy consulted to dose/monitor vancomycin. · Goal trough level = 15-20 mcg/mL  · sCr 1.8; eCrCl 40 - 50 mL/min    Plan:  · Start vancomycin 1750 mg IV q24h and may need to further dose adjust pending renal function.    · Clinical pharmacy to follow      Kyle Durant, [address] [facility] 2021 7:17 AM

## 2021-07-18 NOTE — H&P
Department of Internal Medicine  History and Physical    PCP: Dr. Quang Jose   Admitting Physician: Dr. Ivana Rubio  Consultants:  Date of Service: 7/17/2021    CHIEF COMPLAINT: Generalized tremors, nausea, emesis, diarrhea    HISTORY OF PRESENT ILLNESS:    Patient is 58-year-old female who presents to the ED on urging of daughter due to symptoms of nausea, emesis, generalized tremors, diarrhea, fever/chills. Patient states that she has similar symptoms after getting the first dose of Covid vaccine. States that after the second dose she developed the symptoms that she currently presents with. She states that she has been having what she describes as shakiness. She admits to subjective fever/chills. More recently she has been developing worsening nausea and emesis. She also complains of diarrhea. Patient has chronic cough. Patient has chronic shortness of breath. Patient denies any chest pain. She does admit to malodorous urine which is cloudy.     PAST MEDICAL Hx:  Past Medical History:   Diagnosis Date    Anxiety     CAD (coronary artery disease)     x5 stents    Cardiomyopathy ([address]     Cerebral artery occlusion with cerebral infarction ([address]     COPD (chronic obstructive pulmonary disease) ([address]     Depression     Diabetes mellitus ([address]     IDDM    Hx of cardiovascular stress test 03/22/2016    Nuclear Lexiscan Stress Test  3/22/2016  6/11/2021    Hyperlipidemia     Nonrheumatic mitral (valve) insufficiency     Nonrheumatic tricuspid (valve) insufficiency     Sleep apnea     cpap    Smoker     current 1PPD X35yrs       PAST SURGICAL Hx:   Past Surgical History:   Procedure Laterality Date    ABDOMEN SURGERY      ABDOMINAL EXPLORATION SURGERY N/A 6-5-16    Excision perforated mid body gastric ulcer, primary closure omentum patch    BACK SURGERY      CARDIAC CATHETERIZATION  05/03/2018    Dr. Glenn Null BYPASS GRAFT      DIAGNOSTIC CARDIAC CATH LAB PROCEDURE  05/03/2018    Dr. Erika Crooks multiple PCIs     KNEE ARTHROPLASTY      TOE AMPUTATION Right 1/23/2021    RIGHT SECOND DIGIT AMPUTATION performed by Jean Claude Lew DPM at [address] [place] [address] TRANSESOPHAGEAL ECHOCARDIOGRAM  10/28/2016    Dr. Erika Crooks r/o embolic source       FAMILY Hx:  Family History   Problem Relation Age of Onset    Heart Attack Father        HOME MEDICATIONS:  Prior to Admission medications    Medication Sig Start Date End Date Taking? Authorizing Provider   aspirin 81 MG EC tablet Take by mouth    Historical Provider, MD   atorvastatin (LIPITOR) 40 MG tablet Take by mouth daily     Historical Provider, MD   cilostazol (PLETAL) 100 MG tablet Take by mouth    Historical Provider, MD   clopidogrel (PLAVIX) 75 MG tablet Take 75 mg by mouth daily     Historical Provider, MD   dapagliflozin (FARXIGA) 10 MG tablet Farxiga 10 mg tablet   TAKE 1 TABLET BY MOUTH EVERY DAY    Historical Provider, MD   ergocalciferol (ERGOCALCIFEROL) 1.25 MG (58090 UT) capsule Take by mouth once a week     Historical Provider, MD   escitalopram (LEXAPRO) 20 MG tablet Take by mouth    Historical Provider, MD   ferrous sulfate (IRON 325) 325 (65 Fe) MG tablet Take by mouth daily (with breakfast)     Historical Provider, MD   fluticasone-vilanterol (BREO ELLIPTA) 100-25 MCG/INH AEPB inhaler Inhale into the lungs daily     Historical Provider, MD   fluticasone (FLONASE) 50 MCG/ACT nasal spray SHAKE LIQUID AND USE 1 SPRAY IN EACH NOSTRIL EVERY DAY 6/8/21   Historical Provider, MD   furosemide (LASIX) 20 MG tablet Take 20 mg by mouth every other day     Historical Provider, MD   gabapentin (NEURONTIN) 300 MG capsule Take by mouth.  3 tablets once daily    Historical Provider, MD   LANTUS SOLOSTAR 100 UNIT/ML injection pen INJECT 42 UNITS UNDER THE SKIN EVERY MORNING AND 40 UNITS AT SUPPER 5/18/21   Historical Provider, MD   metFORMIN (GLUCOPHAGE) 1000 MG tablet Take 1,000 mg by mouth 2 times daily (with meals)     Historical Provider, MD   metoprolol succinate (TOPROL XL) 25 MG extended release tablet Take 25 mg by mouth daily     Historical Provider, MD   pantoprazole (PROTONIX) 40 MG tablet Take by mouth daily     Historical Provider, MD   sacubitril-valsartan (ENTRESTO) 24-26 MG per tablet 0.5 tablets 2 times daily     Historical Provider, MD   traZODone (DESYREL) 50 MG tablet Take 50 mg by mouth nightly     Historical Provider, MD   albuterol sulfate  (90 BASE) MCG/ACT inhaler Inhale 2 puffs into the lungs every 6 hours as needed for Wheezing    Historical Provider, MD       ALLERGIES:  Ace inhibitors and Percocet [oxycodone-acetaminophen]    SOCIAL Hx:  Social History     Socioeconomic History    Marital status:      Spouse name: Not on file    Number of children: Not on file    Years of education: Not on file    Highest education level: Not on file   Occupational History    Not on file   Tobacco Use    Smoking status: Current Every Day Smoker     Packs/day: 1.00     Years: 43.00     Pack years: 43.00    Smokeless tobacco: Never Used   Vaping Use    Vaping Use: Never used   Substance and Sexual Activity    Alcohol use: No     Comment: 1 cups of coffee a day    Drug use: No    Sexual activity: Not Currently   Other Topics Concern    Not on file   Social History Narrative    Not on file     Social Determinants of Health     Financial Resource Strain:     Difficulty of Paying Living Expenses:    Food Insecurity:     Worried About Running Out of Food in the Last Year:     Ran Out of Food in the Last Year:    Transportation Needs:     Lack of Transportation (Medical):      Lack of Transportation (Non-Medical):    Physical Activity:     Days of Exercise per Week:     Minutes of Exercise per Session:    Stress:     Feeling of Stress :    Social Connections:     Frequency of Communication with Friends and Family:     Frequency of Social Gatherings with Friends and Family:     Attends Protestant Services:     Active Member of Clubs or Organizations:     Attends Club or Organization Meetings:     Marital Status:    Intimate Partner Violence:     Fear of Current or Ex-Partner:     Emotionally Abused:     Physically Abused:     Sexually Abused:        ROS: Positive in bold  General:   Denies chills, fatigue, fever, malaise, night sweats or weight loss    Psychological:   Denies anxiety, disorientation or hallucinations    ENT:    Denies epistaxis, headaches, vertigo or visual changes    Cardiovascular:   Denies any chest pain, irregular heartbeats, or palpitations. No paroxysmal nocturnal dyspnea. Respiratory:   Denies shortness of breath, coughing, sputum production, hemoptysis, or wheezing. No orthopnea. Gastrointestinal:   Denies nausea, vomiting, diarrhea, or constipation. Denies any abdominal pain. Denies change in bowel habits or stools. Genito-Urinary:    Denies any urgency, frequency, hematuria. Voiding without difficulty. Musculoskeletal:   Denies joint pain, joint stiffness, joint swelling or muscle pain    Neurology:    Denies any headache or focal neurological deficits. No weakness or paresthesia. Derm:    Denies any rashes, ulcers, or excoriations. Denies bruising. Extremities:   Denies any lower extremity swelling or edema. PHYSICAL EXAM: Abnormal findings noted  VITALS:  Vitals:    07/17/21 2203   BP: 130/64   Pulse: 97   Resp: 22   Temp: 98.8 °F (37.1 °C)   SpO2: 96%         CONSTITUTIONAL:    Awake, alert, cooperative, no apparent distress, and appears stated age    EYES:     EOMI, sclera clear, conjunctiva normal    ENT:    Normocephalic, atraumatic,  External ears without lesions. NECK:    Supple, symmetrical, trachea midline,  no JVD    HEMATOLOGIC/LYMPHATICS:    No cervical lymphadenopathy and no supraclavicular lymphadenopathy    LUNGS:    Symmetric.  No increased work of breathing, good air exchange, clear to auscultation bilaterally, no wheezes, rhonchi, or rales,   Patient has coarse breath sounds bilaterally    CARDIOVASCULAR:    Normal apical impulse, regular rate and rhythm, normal S1 and S2, no S3 or S4, and no murmur noted    ABDOMEN:    soft, non-distended, non-tender    MUSCULOSKELETAL:    There is no redness, warmth, or swelling of the joints. NEUROLOGIC:    Awake, alert, oriented to name, place and time. SKIN:    No bruising or bleeding. No redness, warmth, or swelling    EXTREMITIES:    Peripheral pulses present. No edema, cyanosis, or swelling. LINES/CATHETERS     LABORATORY DATA:  CBC with Differential:    Lab Results   Component Value Date    WBC 15.4 07/17/2021    RBC 4.56 07/17/2021    HGB 12.5 07/17/2021    HCT 38.1 07/17/2021     07/17/2021    MCV 83.6 07/17/2021    MCH 27.4 07/17/2021    MCHC 32.8 07/17/2021    RDW 14.7 07/17/2021    SEGSPCT 61 03/15/2014    BANDSPCT 5 06/06/2016    METASPCT 1 06/11/2016    LYMPHOPCT 1.8 07/17/2021    MONOPCT 3.6 07/17/2021    BASOPCT 0.4 07/17/2021    MONOSABS 0.62 07/17/2021    LYMPHSABS 0.31 07/17/2021    EOSABS 0.14 07/17/2021    BASOSABS 0.00 07/17/2021     CMP:    Lab Results   Component Value Date     07/17/2021    K 5.1 07/17/2021     07/17/2021    CO2 23 07/17/2021    BUN 39 07/17/2021    CREATININE 1.7 07/17/2021    GFRAA 37 07/17/2021    LABGLOM 31 07/17/2021    GLUCOSE 166 07/17/2021    GLUCOSE 179 06/17/2011    PROT 7.5 07/17/2021    LABALBU 3.6 07/17/2021    LABALBU 4.2 06/17/2011    CALCIUM 8.9 07/17/2021    BILITOT 0.6 07/17/2021    ALKPHOS 100 07/17/2021    AST 19 07/17/2021    ALT 19 07/17/2021       ASSESSMENT/PLAN:  1. UTI   2. Acute hypoxic respiratory failure  3. community acquired pnuemonia  4. CKD stage III  5. Coronary artery disease status post stent and CABG  6. Systolic congestive heart failure EF of 30 to 35% with stage I diastolic dysfunction  7. Mitral and tricuspid regurgitation  8.  Severe apnea on CPAP  9. COPD  10. Insulin dependent diabetes mellitus  11. History of CVA with right-sided deficits and neuropathy  12. Severe peripheral vascular disease with balloon angioplasty performed in January by Dr. Domonique Hamilton  13. Hyperlipidemia  14. Depression  15. Current tobacco abuse    Patient states that her symptoms began after her second Covid shot. States that symptoms have been present for a few weeks now. She was found to be hypoxic and placed on supplemental oxygen. CTA chest and abdomen and pelvis were obtained. CTA chest was revealed probable residual pneumonia. UA revealed possible UTI in settings of malodorous and cloudy urine patient will be continued on vancomycin and cefepime. Cultures ordered. Nebulized breathing treatments ordered. Wean off supplemental oxygen as tolerated.     Tiffanie Luque [place]  10:24 PM  7/17/2021    Electronically signed by Tiffanie Luque DO on 7/17/21 at 10:24 PM EDT

## 2021-07-18 NOTE — ED PROVIDER NOTES
Chief Complaint   Patient presents with    Loss of Consciousness       Patient is a 39-year-old female who presents today for fevers, generalized fatigue, near syncope. States she has not been feeling well for the past several days. Does endorse getting her second Covid vaccine several weeks ago. She states she had similar symptoms after her first Covid vaccine. On arrival she states she does not feel, has generalized fatigue. Is noted to be febrile. Has not been taking any medication for the symptoms at home. Symptoms not made better or worse by anything specific. On arrival patient also noted to be tachycardic and hypoxic. She does not wear oxygen at home. She is not endorsing chest pain or shortness of breath. The history is provided by the patient. No  was used. Review of Systems   Constitutional: Positive for chills and fever. Negative for fatigue. HENT: Negative for ear pain, sinus pressure and sore throat. Eyes: Negative for pain, discharge and redness. Respiratory: Negative for cough, shortness of breath and wheezing. Cardiovascular: Negative for chest pain, palpitations and leg swelling. Gastrointestinal: Negative for abdominal distention, abdominal pain, diarrhea, nausea and vomiting. Genitourinary: Negative for dysuria and frequency. Musculoskeletal: Negative for arthralgias and back pain. Skin: Negative for rash and wound. Neurological: Negative for dizziness, syncope, facial asymmetry, weakness, light-headedness, numbness and headaches. Hematological: Negative for adenopathy. Psychiatric/Behavioral: Negative for behavioral problems and confusion. All other systems reviewed and are negative. Physical Exam  Vitals and nursing note reviewed. Constitutional:       General: She is not in acute distress. Appearance: Normal appearance. She is well-developed. She is ill-appearing. HENT:      Head: Normocephalic and atraumatic. Eyes:      Pupils: Pupils are equal, round, and reactive to light. Cardiovascular:      Rate and Rhythm: Regular rhythm. Tachycardia present. Pulses: Normal pulses. Pulmonary:      Effort: Pulmonary effort is normal. No respiratory distress. Breath sounds: Normal breath sounds. No wheezing or rales. Abdominal:      General: Bowel sounds are normal.      Palpations: Abdomen is soft. Tenderness: There is no abdominal tenderness. There is no guarding or rebound. Musculoskeletal:      Cervical back: Normal range of motion and neck supple. Skin:     General: Skin is warm and dry. Capillary Refill: Capillary refill takes less than 2 seconds. Neurological:      General: No focal deficit present. Mental Status: She is alert and oriented to person, place, and time. Cranial Nerves: No cranial nerve deficit.       Coordination: Coordination normal.          Procedures     Labs Reviewed   CBC WITH AUTO DIFFERENTIAL - Abnormal; Notable for the following components:       Result Value    WBC 15.4 (*)     Neutrophils % 93.8 (*)     Lymphocytes % 1.8 (*)     Neutrophils Absolute 14.48 (*)     Lymphocytes Absolute 0.31 (*)     All other components within normal limits   COMPREHENSIVE METABOLIC PANEL W/ REFLEX TO MG FOR LOW K - Abnormal; Notable for the following components:    Potassium reflex Magnesium 5.1 (*)     Glucose 166 (*)     BUN 39 (*)     CREATININE 1.7 (*)     All other components within normal limits   TROPONIN - Abnormal; Notable for the following components:    Troponin, High Sensitivity 49 (*)     All other components within normal limits   URINALYSIS - Abnormal; Notable for the following components:    Clarity, UA CLOUDY (*)     Blood, Urine LARGE (*)     Protein,  (*)     Leukocyte Esterase, Urine MODERATE (*)     All other components within normal limits   BLOOD GAS, ARTERIAL - Abnormal; Notable for the following components:    PO2 54.5 (*)     O2 Sat 90.8 (*) O2Hb 86.0 (*)     COHb 5.0 (*)     HHb 8.7 (*)     All other components within normal limits   TROPONIN - Abnormal; Notable for the following components:    Troponin, High Sensitivity 60 (*)     All other components within normal limits   MICROSCOPIC URINALYSIS - Abnormal; Notable for the following components:    WBC, UA >20 (*)     RBC, UA 10-20 (*)     Bacteria, UA MANY (*)     All other components within normal limits   COVID-19, RAPID   CULTURE, BLOOD 1   CULTURE, BLOOD 2   CULTURE, URINE   STREP PNEUMONIAE ANTIGEN   LEGIONELLA ANTIGEN, URINE   CULTURE, RESPIRATORY   RESPIRATORY PANEL, MOLECULAR, WITH COVID-19   LACTATE, SEPSIS   ARTERIAL BLOOD GAS, RESPIRATORY ONLY   HEMOGLOBIN A1C   TSH WITHOUT REFLEX   T4, FREE   PHOSPHORUS   MAGNESIUM   COMPREHENSIVE METABOLIC PANEL   CBC WITH AUTO DIFFERENTIAL   PROCALCITONIN   POCT GLUCOSE   POCT GLUCOSE     CTA PULMONARY W CONTRAST   Final Result   1. Limited examination due to suboptimal timing of contrast.  Segmental and   subsegmental pulmonary arteries are not optimally assessed. No large central   pulmonary embolism. If clinical concern persists for pulmonary embolism,   repeat exam or ventilation perfusion lung scan may be helpful for further   evaluation. 2. Cardiomegaly with pulmonary vascular congestion. 3. Stable focal opacity in peripheral left lower lobe could indicate residual   or recurrent pneumonia versus chronic atelectasis. Continued follow-up   recommended. 4. Stable trace left pleural effusion. EKG #1:  I personally interpreted this EKG  Time:  2013    Rate: 107  Rhythm: Sinus. Interpretation: Sinus tachycardia, normal axis, no ST elevation.   Similar to previous      MDM  Number of Diagnoses or Management Options  Acute respiratory failure with hypoxia ([address]  Pneumonia of left lower lobe due to infectious organism  Septicemia [facility])  Diagnosis management comments: Patient is a 57-year female presents today for cough, shortness of breath, and fevers. On arrival she is noted to be febrile and tachycardic, he was hypoxic with O2 saturation 88% on room air. Does not wear oxygen at baseline. She was placed on 2 L nasal cannula. Heart and lungs clear to auscultation. Lab work and imaging was obtained. This was a concern for possible PE, therefore CTA was obtained. CTA shows no evidence of pulmonary embolism, however does note  opacities to the left lower lobe. Lab work shows elevated white count of 15.4. Covid is negative. Patient does have CARYL, appears to be relatively stable from baseline. She was given IV fluids. O2 saturation did improve on 2 L nasal cannula. Patient was given IV antibiotics, cultures were pending, as patient was recently admitted to the hospital 1 month ago, there is concern for hospital-acquired pneumonia. Patient will be admitted to the hospital.  Hospitalist was consulted who agrees to admit. Patient agrees with this plan. Amount and/or Complexity of Data Reviewed  Clinical lab tests: reviewed  Tests in the radiology section of CPT®: reviewed                --------------------------------------------- PAST HISTORY ---------------------------------------------  Past Medical History:  has a past medical history of Anxiety, CAD (coronary artery disease), Cardiomyopathy ([facility] [address] Cerebral artery occlusion with cerebral infarction ([facility] [address] COPD (chronic obstructive pulmonary disease) ([facility] [address] Depression, Diabetes mellitus ([facility] [address] Hx of cardiovascular stress test, Hyperlipidemia, Nonrheumatic mitral (valve) insufficiency, Nonrheumatic tricuspid (valve) insufficiency, Sleep apnea, and Smoker. Past Surgical History:  has a past surgical history that includes Coronary angioplasty with stent; Abdomen surgery; Abdominal exploration surgery (N/A, 6-5-16); transesophageal echocardiogram (10/28/2016); Cardiac catheterization (05/03/2018); Diagnostic Cardiac Cath Lab Procedure (05/03/2018);  Coronary artery bypass graft; back surgery; Cholecystectomy; Knee Arthroplasty; and Toe amputation (Right, 1/23/2021). Social History:  reports that she has been smoking. She has a 43.00 pack-year smoking history. She has never used smokeless tobacco. She reports that she does not drink alcohol and does not use drugs. Family History: family history includes Heart Attack in her father. The patients home medications have been reviewed.     Allergies: Ace inhibitors and Percocet [oxycodone-acetaminophen]    -------------------------------------------------- RESULTS -------------------------------------------------    LABS:  Results for orders placed or performed during the hospital encounter of 07/17/21   COVID-19, Rapid    Specimen: Nasopharyngeal Swab   Result Value Ref Range    SARS-CoV-2, NAAT Not Detected Not Detected   CBC Auto Differential   Result Value Ref Range    WBC 15.4 (H) 4.5 - 11.5 E9/L    RBC 4.56 3.50 - 5.50 E12/L    Hemoglobin 12.5 11.5 - 15.5 g/dL    Hematocrit 38.1 34.0 - 48.0 %    MCV 83.6 80.0 - 99.9 fL    MCH 27.4 26.0 - 35.0 pg    MCHC 32.8 32.0 - 34.5 %    RDW 14.7 11.5 - 15.0 fL    Platelets 840 674 - 521 E9/L    MPV 9.6 7.0 - 12.0 fL    Neutrophils % 93.8 (H) 43.0 - 80.0 %    Lymphocytes % 1.8 (L) 20.0 - 42.0 %    Monocytes % 3.6 2.0 - 12.0 %    Eosinophils % 0.9 0.0 - 6.0 %    Basophils % 0.4 0.0 - 2.0 %    Neutrophils Absolute 14.48 (H) 1.80 - 7.30 E9/L    Lymphocytes Absolute 0.31 (L) 1.50 - 4.00 E9/L    Monocytes Absolute 0.62 0.10 - 0.95 E9/L    Eosinophils Absolute 0.14 0.05 - 0.50 E9/L    Basophils Absolute 0.00 0.00 - 0.20 E9/L    Polychromasia 1+     Poikilocytes 1+     Ovalocytes 1+     Target Cells 1+    Comprehensive Metabolic Panel w/ Reflex to MG   Result Value Ref Range    Sodium 137 132 - 146 mmol/L    Potassium reflex Magnesium 5.1 (H) 3.5 - 5.0 mmol/L    Chloride 100 98 - 107 mmol/L    CO2 23 22 - 29 mmol/L    Anion Gap 14 7 - 16 mmol/L    Glucose 166 (H) 74 - 99 mg/dL    BUN 39 (H) 6 - 20 mg/dL    CREATININE 1.7 (H) 0.5 - 1.0 mg/dL    GFR Non-African American 31 >=60 mL/min/1.73    GFR African American 37     Calcium 8.9 8.6 - 10.2 mg/dL    Total Protein 7.5 6.4 - 8.3 g/dL    Albumin 3.6 3.5 - 5.2 g/dL    Total Bilirubin 0.6 0.0 - 1.2 mg/dL    Alkaline Phosphatase 100 35 - 104 U/L    ALT 19 0 - 32 U/L    AST 19 0 - 31 U/L   Troponin   Result Value Ref Range    Troponin, High Sensitivity 49 (H) 0 - 9 ng/L   Lactate, Sepsis   Result Value Ref Range    Lactic Acid, Sepsis 1.8 0.5 - 1.9 mmol/L   Urinalysis, reflex to microscopic   Result Value Ref Range    Color, UA Yellow Straw/Yellow    Clarity, UA CLOUDY (A) Clear    Glucose, Ur Negative Negative mg/dL    Bilirubin Urine Negative Negative    Ketones, Urine Negative Negative mg/dL    Specific Gravity, UA 1.020 1.005 - 1.030    Blood, Urine LARGE (A) Negative    pH, UA 6.0 5.0 - 9.0    Protein,  (A) Negative mg/dL    Urobilinogen, Urine 0.2 <2.0 E.U./dL    Nitrite, Urine Negative Negative    Leukocyte Esterase, Urine MODERATE (A) Negative   Blood Gas, Arterial   Result Value Ref Range    Date Analyzed [id number]     Time Analyzed 2046     Source: Blood Arterial     pH, Blood Gas 7.414 7.350 - 7.450    PCO2 39.6 35.0 - 45.0 mmHg    PO2 54.5 (L) 75.0 - 100.0 mmHg    HCO3 24.8 22.0 - 26.0 mmol/L    B.E. 0.3 -3.0 - 3.0 mmol/L    O2 Sat 90.8 (L) 92.0 - 98.5 %    O2Hb 86.0 (L) 94.0 - 97.0 %    COHb 5.0 (H) 0.0 - 1.5 %    MetHb 0.3 0.0 - 1.5 %    O2 Content 16.0 mL/dL    HHb 8.7 (H) 0.0 - 5.0 %    tHb (est) 13.2 11.5 - 16.5 g/dL    Mode NC- 2 L     Date Of Collection      Time Collected      Pt Temp 37.0 C     ID [id number]     Lab [id number]     Critical(s) Notified .  No Critical Values    Troponin   Result Value Ref Range    Troponin, High Sensitivity 60 (H) 0 - 9 ng/L   Microscopic Urinalysis   Result Value Ref Range    WBC, UA >20 (A) 0 - 5 /HPF    RBC, UA 10-20 (A) 0 - 2 /HPF    Epithelial Cells, UA RARE /HPF    Bacteria, UA MANY (A) None Seen /HPF RADIOLOGY:  CTA PULMONARY W CONTRAST   Final Result   1. Limited examination due to suboptimal timing of contrast.  Segmental and   subsegmental pulmonary arteries are not optimally assessed. No large central   pulmonary embolism. If clinical concern persists for pulmonary embolism,   repeat exam or ventilation perfusion lung scan may be helpful for further   evaluation. 2. Cardiomegaly with pulmonary vascular congestion. 3. Stable focal opacity in peripheral left lower lobe could indicate residual   or recurrent pneumonia versus chronic atelectasis. Continued follow-up   recommended. 4. Stable trace left pleural effusion.                 ------------------------- NURSING NOTES AND VITALS REVIEWED ---------------------------  Date / Time Roomed:  7/17/2021  7:53 PM  ED Bed Assignment:  [id number]/0520-01    The nursing notes within the ED encounter and vital signs as below have been reviewed. Patient Vitals for the past 24 hrs:   BP Temp Temp src Pulse Resp SpO2 Height Weight   07/17/21 2325 120/73 98.2 °F (36.8 °C) Oral 84 20 97 % 5' 9\" (1.753 m) 245 lb 12.8 oz (111.5 kg)   07/17/21 2315       5' 9\" (1.753 m)    07/17/21 2203 130/64 98.8 °F (37.1 °C) Oral 97 22 96 %     07/17/21 1957 131/77 102.5 °F (39.2 °C) Oral 112 20 90 %  250 lb (113.4 kg)       Oxygen Saturation Interpretation: Abnormal    ------------------------------------------ PROGRESS NOTES ------------------------------------------    Counseling:  I have spoken with the patient and discussed todays results, in addition to providing specific details for the plan of care and counseling regarding the diagnosis and prognosis. Their questions are answered at this time and they are agreeable with the plan of admission.    --------------------------------- ADDITIONAL PROVIDER NOTES ---------------------------------  Consultations:  Spoke with Dr. Pedro Martinez. Discussed case. They will admit the patient.   This patient's ED course included: a personal history and physicial examination    This patient has remained hemodynamically stable during their ED course.       Medications   vancomycin 2000 mg in dextrose 5% 500 ml IVPB (2,000 mg Intravenous New Bag 7/17/21 2253)   glucose (GLUTOSE) 40 % oral gel 15 g (has no administration in time range)   dextrose 50 % IV solution (has no administration in time range)   glucagon (rDNA) injection 1 mg (has no administration in time range)   dextrose 5 % solution (has no administration in time range)   sodium chloride flush 0.9 % injection 5-40 mL (has no administration in time range)   sodium chloride flush 0.9 % injection 5-40 mL (has no administration in time range)   0.9 % sodium chloride infusion (has no administration in time range)   enoxaparin (LOVENOX) injection 40 mg (has no administration in time range)   polyethylene glycol (GLYCOLAX) packet 17 g (has no administration in time range)   acetaminophen (TYLENOL) tablet 650 mg (has no administration in time range)     Or   acetaminophen (TYLENOL) suppository 650 mg (has no administration in time range)   ipratropium-albuterol (DUONEB) nebulizer solution 1 ampule (has no administration in time range)   aspirin EC tablet 81 mg (has no administration in time range)   clopidogrel (PLAVIX) tablet 75 mg (has no administration in time range)   insulin glargine (LANTUS) injection vial 40 Units (has no administration in time range)   metoprolol succinate (TOPROL XL) extended release tablet 25 mg (has no administration in time range)   pantoprazole (PROTONIX) tablet 40 mg (has no administration in time range)   escitalopram (LEXAPRO) tablet 20 mg (has no administration in time range)   furosemide (LASIX) tablet 20 mg (has no administration in time range)   albuterol (PROVENTIL) nebulizer solution 2.5 mg (has no administration in time range)   acetaminophen (TYLENOL) tablet 1,000 mg (1,000 mg Oral Given 7/17/21 2059)   iopamidol (ISOVUE-370) 76 % injection 75 mL (75 mLs Intravenous Given 7/17/21 2139)   cefepime (MAXIPIME) 2000 mg IVPB minibag (0 mg Intravenous Stopped 7/17/21 2251)         Diagnosis:  1. Acute respiratory failure with hypoxia ([facility] [address]    2. Pneumonia of left lower lobe due to infectious organism    3. Septicemia ([facility] [address]        Disposition:  Patient's disposition: Admit to telemetry  Patient's condition is stable.              Tru Azevedo DO  Resident  07/18/21 2186

## 2021-07-18 NOTE — PROGRESS NOTES
Oxygen saturation 89% on room air at rest ambulated on room air and oxygen saturation maintained 93-95%.

## 2021-07-18 NOTE — PLAN OF CARE
Problem: Falls - Risk of:  Goal: Will remain free from falls  Description: Will remain free from falls  Outcome: Met This Shift  Goal: Absence of physical injury  Description: Absence of physical injury  Outcome: Met This Shift     Problem: OXYGENATION/RESPIRATORY FUNCTION  Goal: Patient will maintain patent airway  Outcome: Met This Shift  Goal: Patient will achieve/maintain normal respiratory rate/effort  Description: Respiratory rate and effort will be within normal limits for the patient  Outcome: Met This Shift     Problem: HEMODYNAMIC STATUS  Goal: Patient has stable vital signs and fluid balance  Outcome: Met This Shift

## 2021-07-19 LAB
ALBUMIN SERPL-MCNC: 3.3 G/DL (ref 3.5–5.2)
ALP BLD-CCNC: 89 U/L (ref 35–104)
ALT SERPL-CCNC: 19 U/L (ref 0–32)
ANION GAP SERPL CALCULATED.3IONS-SCNC: 11 MMOL/L (ref 7–16)
AST SERPL-CCNC: 17 U/L (ref 0–31)
BASOPHILS ABSOLUTE: 0.04 E9/L (ref 0–0.2)
BASOPHILS RELATIVE PERCENT: 0.5 % (ref 0–2)
BILIRUB SERPL-MCNC: 0.3 MG/DL (ref 0–1.2)
BOTTLE TYPE: ABNORMAL
BUN BLDV-MCNC: 36 MG/DL (ref 6–20)
CALCIUM SERPL-MCNC: 8.8 MG/DL (ref 8.6–10.2)
CANDIDA ALBICANS BY PCR: NOT DETECTED
CANDIDA GLABRATA BY PCR: NOT DETECTED
CANDIDA KRUSEI BY PCR: NOT DETECTED
CANDIDA PARAPSILOSIS BY PCR: NOT DETECTED
CANDIDA TROPICALIS BY PCR: NOT DETECTED
CARBAPENEM RESISTANCE KPC BY PCR: NOT DETECTED
CHLORIDE BLD-SCNC: 103 MMOL/L (ref 98–107)
CO2: 23 MMOL/L (ref 22–29)
CREAT SERPL-MCNC: 1.6 MG/DL (ref 0.5–1)
EKG ATRIAL RATE: 107 BPM
EKG P AXIS: 72 DEGREES
EKG P-R INTERVAL: 166 MS
EKG Q-T INTERVAL: 364 MS
EKG QRS DURATION: 98 MS
EKG QTC CALCULATION (BAZETT): 485 MS
EKG R AXIS: 27 DEGREES
EKG T AXIS: 75 DEGREES
EKG VENTRICULAR RATE: 107 BPM
ENTEROBACTER CLOACAE COMPLEX BY PCR: NOT DETECTED
ENTEROBACTERALES BY PCR: DETECTED
EOSINOPHILS ABSOLUTE: 0.41 E9/L (ref 0.05–0.5)
EOSINOPHILS RELATIVE PERCENT: 4.6 % (ref 0–6)
ESCHERICHIA COLI BY PCR: DETECTED
GFR AFRICAN AMERICAN: 40
GFR NON-AFRICAN AMERICAN: 33 ML/MIN/1.73
GLUCOSE BLD-MCNC: 100 MG/DL (ref 74–99)
HAEMOPHILUS INFLUENZAE BY PCR: NOT DETECTED
HCT VFR BLD CALC: 32.9 % (ref 34–48)
HEMOGLOBIN: 10.4 G/DL (ref 11.5–15.5)
IMMATURE GRANULOCYTES #: 0.04 E9/L
IMMATURE GRANULOCYTES %: 0.5 % (ref 0–5)
KLEBSIELLA OXYTOCA BY PCR: NOT DETECTED
KLEBSIELLA PNEUMONIAE GROUP BY PCR: NOT DETECTED
L. PNEUMOPHILA SEROGP 1 UR AG: NORMAL
LISTERIA MONOCYTOGENES BY PCR: NOT DETECTED
LYMPHOCYTES ABSOLUTE: 1.63 E9/L (ref 1.5–4)
LYMPHOCYTES RELATIVE PERCENT: 18.4 % (ref 20–42)
MAGNESIUM: 2 MG/DL (ref 1.6–2.6)
MCH RBC QN AUTO: 27.1 PG (ref 26–35)
MCHC RBC AUTO-ENTMCNC: 31.6 % (ref 32–34.5)
MCV RBC AUTO: 85.7 FL (ref 80–99.9)
METER GLUCOSE: 102 MG/DL (ref 74–99)
METER GLUCOSE: 152 MG/DL (ref 74–99)
METER GLUCOSE: 218 MG/DL (ref 74–99)
METER GLUCOSE: 227 MG/DL (ref 74–99)
METER GLUCOSE: 247 MG/DL (ref 74–99)
METER GLUCOSE: 98 MG/DL (ref 74–99)
MONOCYTES ABSOLUTE: 1.09 E9/L (ref 0.1–0.95)
MONOCYTES RELATIVE PERCENT: 12.3 % (ref 2–12)
NEISSERIA MENINGITIDIS BY PCR: NOT DETECTED
NEUTROPHILS ABSOLUTE: 5.64 E9/L (ref 1.8–7.3)
NEUTROPHILS RELATIVE PERCENT: 63.7 % (ref 43–80)
ORDER NUMBER: ABNORMAL
PDW BLD-RTO: 14.7 FL (ref 11.5–15)
PHOSPHORUS: 3.4 MG/DL (ref 2.5–4.5)
PLATELET # BLD: 276 E9/L (ref 130–450)
PMV BLD AUTO: 10 FL (ref 7–12)
POTASSIUM SERPL-SCNC: 4.2 MMOL/L (ref 3.5–5)
PROTEUS SPECIES BY PCR: NOT DETECTED
PSEUDOMONAS AERUGINOSA BY PCR: NOT DETECTED
RBC # BLD: 3.84 E12/L (ref 3.5–5.5)
SERRATIA MARCESCENS BY PCR: NOT DETECTED
SODIUM BLD-SCNC: 137 MMOL/L (ref 132–146)
SOURCE OF BLOOD CULTURE: ABNORMAL
STAPHYLOCOCCUS AUREUS BY PCR: NOT DETECTED
STAPHYLOCOCCUS SPECIES BY PCR: NOT DETECTED
STREP PNEUMONIAE ANTIGEN, URINE: NORMAL
STREPTOCOCCUS AGALACTIAE BY PCR: NOT DETECTED
STREPTOCOCCUS PNEUMONIAE BY PCR: NOT DETECTED
STREPTOCOCCUS PYOGENES  BY PCR: NOT DETECTED
STREPTOCOCCUS SPECIES BY PCR: NOT DETECTED
TOTAL PROTEIN: 6.6 G/DL (ref 6.4–8.3)
WBC # BLD: 8.9 E9/L (ref 4.5–11.5)

## 2021-07-19 PROCEDURE — 93010 ELECTROCARDIOGRAM REPORT: CPT | Performed by: INTERNAL MEDICINE

## 2021-07-19 PROCEDURE — 6370000000 HC RX 637 (ALT 250 FOR IP): Performed by: INTERNAL MEDICINE

## 2021-07-19 PROCEDURE — 36415 COLL VENOUS BLD VENIPUNCTURE: CPT

## 2021-07-19 PROCEDURE — 84100 ASSAY OF PHOSPHORUS: CPT

## 2021-07-19 PROCEDURE — 2580000003 HC RX 258: Performed by: INTERNAL MEDICINE

## 2021-07-19 PROCEDURE — 83735 ASSAY OF MAGNESIUM: CPT

## 2021-07-19 PROCEDURE — 2060000000 HC ICU INTERMEDIATE R&B

## 2021-07-19 PROCEDURE — 82962 GLUCOSE BLOOD TEST: CPT

## 2021-07-19 PROCEDURE — 94640 AIRWAY INHALATION TREATMENT: CPT

## 2021-07-19 PROCEDURE — 6360000002 HC RX W HCPCS: Performed by: INTERNAL MEDICINE

## 2021-07-19 PROCEDURE — 80053 COMPREHEN METABOLIC PANEL: CPT

## 2021-07-19 PROCEDURE — 85025 COMPLETE CBC W/AUTO DIFF WBC: CPT

## 2021-07-19 RX ADMIN — ENOXAPARIN SODIUM 40 MG: 40 INJECTION SUBCUTANEOUS at 10:02

## 2021-07-19 RX ADMIN — ASPIRIN 81 MG: 81 TABLET, COATED ORAL at 10:02

## 2021-07-19 RX ADMIN — ESCITALOPRAM OXALATE 20 MG: 10 TABLET ORAL at 20:28

## 2021-07-19 RX ADMIN — METOPROLOL SUCCINATE 25 MG: 25 TABLET, EXTENDED RELEASE ORAL at 10:02

## 2021-07-19 RX ADMIN — CEFEPIME HYDROCHLORIDE 2000 MG: 2 INJECTION, POWDER, FOR SOLUTION INTRAVENOUS at 10:02

## 2021-07-19 RX ADMIN — INSULIN GLARGINE 40 UNITS: 100 INJECTION, SOLUTION SUBCUTANEOUS at 20:28

## 2021-07-19 RX ADMIN — INSULIN GLARGINE 40 UNITS: 100 INJECTION, SOLUTION SUBCUTANEOUS at 10:07

## 2021-07-19 RX ADMIN — VANCOMYCIN HYDROCHLORIDE 1750 MG: 10 INJECTION, POWDER, LYOPHILIZED, FOR SOLUTION INTRAVENOUS at 22:45

## 2021-07-19 RX ADMIN — PANTOPRAZOLE SODIUM 40 MG: 40 TABLET, DELAYED RELEASE ORAL at 06:37

## 2021-07-19 RX ADMIN — SODIUM CHLORIDE, PRESERVATIVE FREE 10 ML: 5 INJECTION INTRAVENOUS at 11:01

## 2021-07-19 RX ADMIN — SODIUM CHLORIDE: 9 INJECTION, SOLUTION INTRAVENOUS at 14:00

## 2021-07-19 RX ADMIN — IPRATROPIUM BROMIDE AND ALBUTEROL SULFATE 1 AMPULE: .5; 2.5 SOLUTION RESPIRATORY (INHALATION) at 16:11

## 2021-07-19 RX ADMIN — IPRATROPIUM BROMIDE AND ALBUTEROL SULFATE 1 AMPULE: .5; 2.5 SOLUTION RESPIRATORY (INHALATION) at 09:24

## 2021-07-19 RX ADMIN — CLOPIDOGREL BISULFATE 75 MG: 75 TABLET ORAL at 10:02

## 2021-07-19 RX ADMIN — IPRATROPIUM BROMIDE AND ALBUTEROL SULFATE 1 AMPULE: .5; 2.5 SOLUTION RESPIRATORY (INHALATION) at 05:31

## 2021-07-19 RX ADMIN — ACETAMINOPHEN 650 MG: 325 TABLET ORAL at 20:28

## 2021-07-19 RX ADMIN — SODIUM CHLORIDE: 9 INJECTION, SOLUTION INTRAVENOUS at 02:46

## 2021-07-19 RX ADMIN — IPRATROPIUM BROMIDE AND ALBUTEROL SULFATE 1 AMPULE: .5; 2.5 SOLUTION RESPIRATORY (INHALATION) at 12:47

## 2021-07-19 ASSESSMENT — PAIN SCALES - GENERAL
PAINLEVEL_OUTOF10: 0
PAINLEVEL_OUTOF10: 0
PAINLEVEL_OUTOF10: 3
PAINLEVEL_OUTOF10: 0
PAINLEVEL_OUTOF10: 8

## 2021-07-19 ASSESSMENT — PAIN DESCRIPTION - DESCRIPTORS: DESCRIPTORS: TINGLING;ACHING

## 2021-07-19 ASSESSMENT — PAIN DESCRIPTION - PAIN TYPE
TYPE: CHRONIC PAIN
TYPE: CHRONIC PAIN

## 2021-07-19 ASSESSMENT — PAIN DESCRIPTION - LOCATION: LOCATION: BACK;LEG

## 2021-07-19 ASSESSMENT — PAIN DESCRIPTION - ORIENTATION: ORIENTATION: RIGHT

## 2021-07-19 NOTE — PROGRESS NOTES
Internal Medicine Progress Note    [facility]    Leandra Collet. Radha Toscano., CIARA Benson D.O., KATRIN Reynolds, MSN, APRN, NP-C  Sherron Ram. El Brown, MSN, APRN-CNP     Primary Care Physician: Heydi Smith MD   Admitting Physician:  Leandra Gaytan DO  Admission date and time: 7/17/2021  7:53 PM    Room:  [address] [place] [address]  Admitting diagnosis: Pneumonia [J18.9]    Patient Name: Geeta Guardado  MRN: [id number]    Date of Service: 7/19/2021     Subjective:  Oswaldo Larry is a 61 y.o. female who was seen and examined today,7/19/2021, at the bedside. Oswaldo Larry was evaluated in the presence of her daughter today. She continues to improve on a daily basis. She has been weaned off nasal cannula oxygen and creatinine is trended downward. She is tolerating antibiotic therapy and believes she is approaching her baseline. She is anxious for discharge home. Review of System:   Constitutional:   No further fever or chills. No further malaise or fatigue. HEENT:   Denies ear pain, sore throat, sinus or eye problems. Cardiovascular:   Denies any chest pain, irregular heartbeats, or palpitations. Respiratory:   No further shortness of breath. Gastrointestinal:   Denies nausea, vomiting, diarrhea, or constipation. Denies any abdominal pain. Genitourinary:    Less urgency and frequency. Extremities:   Denies lower extremity swelling, edema or cyanosis. Neurology:    Denies any headache or focal neurological deficits, Denies generalized weakness or memory difficulty. Psch:   Denies being anxious or depressed. Musculoskeletal:    Denies  myalgias, joint complaints or back pain. Integumentary:   Denies any rashes, ulcers, or excoriations. Denies bruising. Hematologic/Lymphatic:  Denies bruising or bleeding. Physical Exam:  No intake/output data recorded.     Intake/Output Summary (Last 24 hours) at 7/19/2021 0849  Last data filed at enoxaparin  40 mg Subcutaneous Daily    ipratropium-albuterol  1 ampule Inhalation Q4H While awake    aspirin  81 mg Oral Daily    clopidogrel  75 mg Oral Daily    insulin glargine  40 Units Subcutaneous BID    metoprolol succinate  25 mg Oral Daily    pantoprazole  40 mg Oral QAM AC    escitalopram  20 mg Oral Nightly     Continuous Infusions:   sodium chloride Stopped (07/19/21 0515)    dextrose      sodium chloride         Objective Data:  CBC with Differential:    Lab Results   Component Value Date    WBC 8.9 07/19/2021    RBC 3.84 07/19/2021    HGB 10.4 07/19/2021    HCT 32.9 07/19/2021     07/19/2021    MCV 85.7 07/19/2021    MCH 27.1 07/19/2021    MCHC 31.6 07/19/2021    RDW 14.7 07/19/2021    SEGSPCT 61 03/15/2014    BANDSPCT 5 06/06/2016    METASPCT 1 06/11/2016    LYMPHOPCT 18.4 07/19/2021    MONOPCT 12.3 07/19/2021    BASOPCT 0.5 07/19/2021    MONOSABS 1.09 07/19/2021    LYMPHSABS 1.63 07/19/2021    EOSABS 0.41 07/19/2021    BASOSABS 0.04 07/19/2021     CMP:    Lab Results   Component Value Date     07/19/2021    K 4.2 07/19/2021    K 5.1 07/17/2021     07/19/2021    CO2 23 07/19/2021    BUN 36 07/19/2021    CREATININE 1.6 07/19/2021    GFRAA 40 07/19/2021    LABGLOM 33 07/19/2021    GLUCOSE 100 07/19/2021    GLUCOSE 179 06/17/2011    PROT 6.6 07/19/2021    LABALBU 3.3 07/19/2021    LABALBU 4.2 06/17/2011    CALCIUM 8.8 07/19/2021    BILITOT 0.3 07/19/2021    ALKPHOS 89 07/19/2021    AST 17 07/19/2021    ALT 19 07/19/2021       Assessment:  1. Sepsis secondary to healthcare associated pneumonia complicated by urinary tract infection  2. Acute respiratory failure with hypoxia  3. Acute on chronic kidney disease stage III  4. Coronary artery disease status post stent and CABG  5. Chronic, compensated systolic congestive heart failure  6. Obstructive sleep apnea  7. Nonoxygen dependent COPD  8. Insulin-dependent diabetes mellitus type 2  9.  History of CVA with right-sided deficits and neuropathy  10. Severe peripheral vascular disease  11. Hyperlipidemia  12. Depression  13. Current tobacco abuse    Plan:   Cultures remain negative at this point and we will continue with IV antibiotic therapy as the patient has responded accordingly. She is no longer febrile and heart rate is well controlled. She has been weaned off nasal cannula oxygen and vital signs have stabilized. Renal function has improved following gentle IV fluid resuscitation yesterday with temporary holding of renal toxic medications. I have encouraged her to become increasingly ambulatory today. Plans will be for discharge home tomorrow with transition to oral antibiotics pending final culture results. Her daughter was updated at the bedside. More than 50% of my  time was spent at the bedside counseling/coordinating care with the patient and/or family with face to face contact. This time was spent reviewing notes and laboratory data as well as instructing and counseling the patient. Time I spent with the family or surrogate(s) is included only if the patient was incapable of providing the necessary information or participating in medical decisions. I also discussed the differential diagnosis and all of the proposed management plans with the patient and individuals accompanying the patient. Keisha Enriquez requires this high level of physician care and nursing on the IMC/Telemetry unit due the complexity of decision management and chance of rapid decline or death. Continued cardiac monitoring and higher level of nursing are required. I am readily available for any further decision-making and intervention.      David Maya DO, F.A.C.O.I.  7/19/2021  8:49 AM

## 2021-07-19 NOTE — PROGRESS NOTES
Physician Progress Note      Sair Palma  CSN #:                  [id number]  :                       1965  ADMIT DATE:       2021 7:53 PM  [address] DATE:  RESPONDING  PROVIDER #:        Contreras Pinon DO          QUERY TEXT:    Pt admitted with Sepsis. Pt noted to have \"Acute on chronic kidney disease   stage III\". If possible, please document in the progress notes and discharge   summary if you are evaluating and/or treating any of the following: The medical record reflects the following:  Risk Factors: diabetes mellitus type 2, Severe peripheral vascular disease,   CKD  Clinical Indicators: IM Note: \"Acute on chronic kidney disease stage III\";   :bun-39, Cr-1.7, GFR-31; : Bun-37, Cr-1.8, GFR-39. ED PN: \"Patient   does have CARYL, appears to be relatively stable from baseline. She was given   IV fluids. \"  Treatment: IVF, lab monitoring and assessments. Thank you,  Joanna Bansal RN, BSN, CCDS  [phone]  Options provided:  -- Acute kidney failure on CKD-3  -- Acute kidney failure ruled out and CKD only  -- Other - I will add my own diagnosis  -- Disagree - Not applicable / Not valid  -- Disagree - Clinically unable to determine / Unknown  -- Refer to Clinical Documentation Reviewer    PROVIDER RESPONSE TEXT:    This patient is in Acute kidney failure on CKD-3. Query created by:  Milagros Acevedo on 2021 10:12 AM      Electronically signed by:  Contreras Pinon DO 2021 2:52 PM

## 2021-07-19 NOTE — PROGRESS NOTES
Pharmacy Consultation Note  (Antibiotic Dosing and Monitoring)    Initial consult date: 21  Consulting physician: Dr Ana Todd  Drug(s): Vancomycin IV  Indication: Pneumonia    Ht Readings from Last 1 Encounters:   21 5' 9\" (1.753 m)     Wt Readings from Last 1 Encounters:   21 245 lb 12.8 oz (111.5 kg)       Age/  Gender Actual BW IBW DW  Allergy Information   61 y.o.     female 111.5 kg 66.2 kg 84.3 kg  Ace inhibitors and Percocet [oxycodone-acetaminophen]                 Date  WBC BUN/CR UOP Drug/Dose Time   Given Level(s)   (Time) Comments     (#1) 15.4 39/1.7 -- Vancomycin 2000 mg IV x 1 3       (#2) 13.1 37/1.8 -- Vancomycin 1750 mg IV Q24H 2230       (#3) 8.9 36/1.6 -- Vancomycin 1750 mg IV Q24H <2200>       (#4)            (#5)            (#6)            (#7)            Estimated Creatinine Clearance: 52 mL/min (A) (based on SCr of 1.6 mg/dL (H)). UOP over the past 24 hours:       Intake/Output Summary (Last 24 hours) at 2021 1614  Last data filed at 2021 1230  Gross per 24 hour   Intake 420 ml   Output 800 ml   Net -380 ml       Temp max: Temp (24hrs), Av.6 °F (37 °C), Min:98.1 °F (36.7 °C), Max:99 °F (37.2 °C)      Antibiotic Regimen:  Antibiotic Dose Date Initiated   Cefepime 2 g IV Q12H      Cultures:  available culture and sensitivity results were reviewed in EPIC  Cultures sent and are pending. Culture Date Result    Blood cx #1  Enterobacter and E coli per PCR   Blood cx #2  Enterobacter and E coli per PCR   Covid-19  Not detected   Urine cx  > 100,000 cfu/mL E coli   Respiratory Panel  Negative   Rapid flu  Negative   Respiratory cx; expectorated  Few GPOs  Rare gram variable rods     Assessment:  · Consulted by Dr. Ana Muzzy to dose/monitor vancomycin  · Goal trough level:  15-20 mcg/mL  · Pt is a 62 y/o F who presented with general illness complaints.  Ct demonstrated a possible residual pneumonia  · Serum creatinine today: 1.6; CrCl ~ 52 mL/min; baseline Scr ~ 1.3  · 7/19:  Both original blood sets yielding bacterial growth    Plan:  · Vancomycin 1750 mg IV Q24H  · Level prior to fourth dose  · Follow renal function  · Pharmacist will follow and monitor/adjust dosing as necessary      Thank you for the consult,    Gabo Moran, PharmD 7/19/2021 4:24 PM   [phone]

## 2021-07-19 NOTE — CARE COORDINATION
7/19/21 1108 CM note: COVID (-) X 2 on 7/17 & 7/18. Met with patient at the bedside to discuss transition of care at discharge. Patient lives alone in a 1 floor no steps to enter home; however her daughter lives close to her and stops daily to assist with any needs she may have. Patient is independent with ADLs, drives, and DME includes walker, cane, shower chair, wc, BSC, and grab bars for toilet and shower. Patient has a cpap through [facility]. Pts PCP is Juan Estrada and her pharmacy is [facility] in [place]. Hx [facility]; no hx [facility]. Discharge plan is home and patient declines need for [facility] or any other needs. Patient anticipates being discharged tomorrow stating she has never been on vacation and her daughter is taking her on vacation with plans to leave this Wednesday. Pts daughter will provide transportation home.  Electronically signed by Garrett Robbins RN on 7/19/2021 at 11:20 AM

## 2021-07-19 NOTE — PLAN OF CARE
Problem: Falls - Risk of:  Goal: Will remain free from falls  Description: Will remain free from falls  Outcome: Met This Shift  Goal: Absence of physical injury  Description: Absence of physical injury  Outcome: Met This Shift     Problem: OXYGENATION/RESPIRATORY FUNCTION  Goal: Patient will maintain patent airway  Outcome: Met This Shift  Goal: Patient will achieve/maintain normal respiratory rate/effort  Description: Respiratory rate and effort will be within normal limits for the patient  Outcome: Met This Shift     Problem: HEMODYNAMIC STATUS  Goal: Patient has stable vital signs and fluid balance  Outcome: Met This Shift     Problem: Skin Integrity:  Goal: Will show no infection signs and symptoms  Description: Will show no infection signs and symptoms  Outcome: Met This Shift  Goal: Absence of new skin breakdown  Description: Absence of new skin breakdown  Outcome: Met This Shift

## 2021-07-20 VITALS
SYSTOLIC BLOOD PRESSURE: 155 MMHG | HEART RATE: 80 BPM | RESPIRATION RATE: 18 BRPM | DIASTOLIC BLOOD PRESSURE: 68 MMHG | WEIGHT: 245.8 LBS | BODY MASS INDEX: 36.4 KG/M2 | TEMPERATURE: 99.1 F | HEIGHT: 69 IN | OXYGEN SATURATION: 94 %

## 2021-07-20 LAB
ALBUMIN SERPL-MCNC: 3.4 G/DL (ref 3.5–5.2)
ALP BLD-CCNC: 95 U/L (ref 35–104)
ALT SERPL-CCNC: 23 U/L (ref 0–32)
ANION GAP SERPL CALCULATED.3IONS-SCNC: 10 MMOL/L (ref 7–16)
AST SERPL-CCNC: 18 U/L (ref 0–31)
BASOPHILS ABSOLUTE: 0.05 E9/L (ref 0–0.2)
BASOPHILS RELATIVE PERCENT: 0.6 % (ref 0–2)
BILIRUB SERPL-MCNC: 0.3 MG/DL (ref 0–1.2)
BUN BLDV-MCNC: 29 MG/DL (ref 6–20)
CALCIUM SERPL-MCNC: 9.3 MG/DL (ref 8.6–10.2)
CHLORIDE BLD-SCNC: 104 MMOL/L (ref 98–107)
CO2: 25 MMOL/L (ref 22–29)
CREAT SERPL-MCNC: 1.4 MG/DL (ref 0.5–1)
CULTURE, RESPIRATORY: NORMAL
EOSINOPHILS ABSOLUTE: 0.55 E9/L (ref 0.05–0.5)
EOSINOPHILS RELATIVE PERCENT: 6.5 % (ref 0–6)
GFR AFRICAN AMERICAN: 47
GFR NON-AFRICAN AMERICAN: 39 ML/MIN/1.73
GLUCOSE BLD-MCNC: 82 MG/DL (ref 74–99)
HCT VFR BLD CALC: 33.9 % (ref 34–48)
HEMOGLOBIN: 11.1 G/DL (ref 11.5–15.5)
IMMATURE GRANULOCYTES #: 0.03 E9/L
IMMATURE GRANULOCYTES %: 0.4 % (ref 0–5)
LYMPHOCYTES ABSOLUTE: 2.11 E9/L (ref 1.5–4)
LYMPHOCYTES RELATIVE PERCENT: 25 % (ref 20–42)
MCH RBC QN AUTO: 27.3 PG (ref 26–35)
MCHC RBC AUTO-ENTMCNC: 32.7 % (ref 32–34.5)
MCV RBC AUTO: 83.5 FL (ref 80–99.9)
METER GLUCOSE: 76 MG/DL (ref 74–99)
MONOCYTES ABSOLUTE: 1.05 E9/L (ref 0.1–0.95)
MONOCYTES RELATIVE PERCENT: 12.5 % (ref 2–12)
NEUTROPHILS ABSOLUTE: 4.64 E9/L (ref 1.8–7.3)
NEUTROPHILS RELATIVE PERCENT: 55 % (ref 43–80)
ORGANISM: ABNORMAL
PDW BLD-RTO: 14.6 FL (ref 11.5–15)
PLATELET # BLD: 304 E9/L (ref 130–450)
PMV BLD AUTO: 9.9 FL (ref 7–12)
POTASSIUM SERPL-SCNC: 4.4 MMOL/L (ref 3.5–5)
RBC # BLD: 4.06 E12/L (ref 3.5–5.5)
SMEAR, RESPIRATORY: NORMAL
SODIUM BLD-SCNC: 139 MMOL/L (ref 132–146)
TOTAL PROTEIN: 6.9 G/DL (ref 6.4–8.3)
URINE CULTURE, ROUTINE: ABNORMAL
WBC # BLD: 8.4 E9/L (ref 4.5–11.5)

## 2021-07-20 PROCEDURE — 6360000002 HC RX W HCPCS: Performed by: INTERNAL MEDICINE

## 2021-07-20 PROCEDURE — 36415 COLL VENOUS BLD VENIPUNCTURE: CPT

## 2021-07-20 PROCEDURE — 80053 COMPREHEN METABOLIC PANEL: CPT

## 2021-07-20 PROCEDURE — 6370000000 HC RX 637 (ALT 250 FOR IP): Performed by: INTERNAL MEDICINE

## 2021-07-20 PROCEDURE — 82962 GLUCOSE BLOOD TEST: CPT

## 2021-07-20 PROCEDURE — 94640 AIRWAY INHALATION TREATMENT: CPT

## 2021-07-20 PROCEDURE — 85025 COMPLETE CBC W/AUTO DIFF WBC: CPT

## 2021-07-20 PROCEDURE — 2580000003 HC RX 258: Performed by: INTERNAL MEDICINE

## 2021-07-20 RX ORDER — LEVOFLOXACIN 750 MG/1
750 TABLET ORAL DAILY
Qty: 10 TABLET | Refills: 0 | Status: SHIPPED | OUTPATIENT
Start: 2021-07-20 | End: 2021-07-30

## 2021-07-20 RX ADMIN — ASPIRIN 81 MG: 81 TABLET, COATED ORAL at 09:31

## 2021-07-20 RX ADMIN — PANTOPRAZOLE SODIUM 40 MG: 40 TABLET, DELAYED RELEASE ORAL at 05:30

## 2021-07-20 RX ADMIN — METOPROLOL SUCCINATE 25 MG: 25 TABLET, EXTENDED RELEASE ORAL at 09:31

## 2021-07-20 RX ADMIN — IPRATROPIUM BROMIDE AND ALBUTEROL SULFATE 1 AMPULE: .5; 2.5 SOLUTION RESPIRATORY (INHALATION) at 09:48

## 2021-07-20 RX ADMIN — CEFEPIME HYDROCHLORIDE 2000 MG: 2 INJECTION, POWDER, FOR SOLUTION INTRAVENOUS at 00:52

## 2021-07-20 RX ADMIN — IPRATROPIUM BROMIDE AND ALBUTEROL SULFATE 1 AMPULE: .5; 2.5 SOLUTION RESPIRATORY (INHALATION) at 06:23

## 2021-07-20 RX ADMIN — SODIUM CHLORIDE: 9 INJECTION, SOLUTION INTRAVENOUS at 01:50

## 2021-07-20 RX ADMIN — CLOPIDOGREL BISULFATE 75 MG: 75 TABLET ORAL at 09:31

## 2021-07-20 ASSESSMENT — PAIN SCALES - GENERAL: PAINLEVEL_OUTOF10: 8

## 2021-07-20 ASSESSMENT — PAIN DESCRIPTION - ORIENTATION: ORIENTATION: RIGHT

## 2021-07-20 ASSESSMENT — PAIN DESCRIPTION - LOCATION: LOCATION: BACK;LEG

## 2021-07-20 ASSESSMENT — PAIN DESCRIPTION - PAIN TYPE: TYPE: CHRONIC PAIN

## 2021-07-20 NOTE — PLAN OF CARE
Problem: Falls - Risk of:  Goal: Will remain free from falls  Description: Will remain free from falls  7/20/2021 1016 by Alonso Viramontes RN  Outcome: Completed  7/20/2021 0007 by Marisol Singh RN  Outcome: Met This Shift  Goal: Absence of physical injury  Description: Absence of physical injury  7/20/2021 1016 by Alonso Viramontes RN  Outcome: Completed  7/20/2021 0007 by Marisol Singh RN  Outcome: Met This Shift     Problem: OXYGENATION/RESPIRATORY FUNCTION  Goal: Patient will maintain patent airway  7/20/2021 1016 by Alonso Viramontes RN  Outcome: Completed  7/20/2021 0007 by Marisol Singh RN  Outcome: Met This Shift  Goal: Patient will achieve/maintain normal respiratory rate/effort  Description: Respiratory rate and effort will be within normal limits for the patient  7/20/2021 1016 by Alonso Viramontes RN  Outcome: Completed  7/20/2021 0007 by Marisol Singh RN  Outcome: Met This Shift     Problem: HEMODYNAMIC STATUS  Goal: Patient has stable vital signs and fluid balance  7/20/2021 1016 by Alonso Viramontes RN  Outcome: Completed  7/20/2021 0007 by Marisol Singh RN  Outcome: Met This Shift     Problem: Skin Integrity:  Goal: Will show no infection signs and symptoms  Description: Will show no infection signs and symptoms  7/20/2021 1016 by Alonso Viramontes RN  Outcome: Completed  7/20/2021 0007 by Marisol Singh RN  Outcome: Met This Shift  Goal: Absence of new skin breakdown  Description: Absence of new skin breakdown  7/20/2021 1016 by Alonso Viramontes RN  Outcome: Completed  7/20/2021 0007 by Marisol Singh RN  Outcome: Met This Shift     Problem: Pain:  Goal: Pain level will decrease  Description: Pain level will decrease  7/20/2021 1016 by Alonso Viramontes RN  Outcome: Completed  7/20/2021 0007 by Marisol Singh RN  Outcome: Met This Shift  Goal: Control of acute pain  Description: Control of acute pain  7/20/2021 1016 by Alonso Viramontes RN  Outcome: Completed  7/20/2021 0007 by Marisol Singh RN  Outcome: Met This Shift  Goal: Control of chronic pain  Description: Control of chronic pain  7/20/2021 1016 by Vanna Valdes RN  Outcome: Completed  7/20/2021 0007 by Marquis Tamika RN  Outcome: Ongoing

## 2021-07-20 NOTE — DISCHARGE SUMMARY
Internal Medicine Progress Note     [facility]     Portillo Hernandez. Abraham Mares., DEONNA.A.C.ONoeI. ROXANA ColonO., DEONNA.A.C.ONoeINoe Martin D.O. Nilton Ventura, MSN, APRN, NP-C  Leslye Edwards. Radha Barnhart, MSN, [id number] [facility]       Internal Medicine  Discharge Summary    NAME: Pro Cuevas  :  1965  MRN:  [id number]  Khalida Fox MD  ADMITTED: 2021      DISCHARGED: 21    ADMITTING PHYSICIAN: Portillo Umana DO    CONSULTANT(S):   PHARMACY TO DOSE VANCOMYCIN  IP CONSULT TO RESPIRATORY CARE     ADMITTING DIAGNOSIS:   Pneumonia [J18.9]     DISCHARGE DIAGNOSES:   1. Sepsis secondary to healthcare associated pneumonia complicated by urinary tract infection with concomitant E. coli bacteremia  2. Acute respiratory failure with hypoxia  3. Acute on chronic kidney disease stage III  4. Coronary artery disease status post stent and CABG  5. Chronic, compensated systolic congestive heart failure  6. Obstructive sleep apnea  7. Nonoxygen dependent COPD  8. Insulin-dependent diabetes mellitus type 2  9. History of CVA with right-sided deficits and neuropathy  10. Severe peripheral vascular disease  11. Hyperlipidemia  12. Depression  13. Current tobacco abuse    BRIEF HISTORY OF PRESENT ILLNESS:   Patient is 78-year-old female who presents to the ED on urging of daughter due to symptoms of nausea, emesis, generalized tremors, diarrhea, fever/chills. Patient states that she has similar symptoms after getting the first dose of Covid vaccine. States that after the second dose she developed the symptoms that she currently presents with. She states that she has been having what she describes as shakiness. She admits to subjective fever/chills. More recently she has been developing worsening nausea and emesis. She also complains of diarrhea. Patient has chronic cough. Patient has chronic shortness of breath. Patient denies any chest pain.   She does admit to malodorous urine which is cloudy. LABS[de-identified]  Lab Results   Component Value Date    WBC 8.4 07/20/2021    HGB 11.1 (L) 07/20/2021    HCT 33.9 (L) 07/20/2021     07/20/2021     07/20/2021    K 4.4 07/20/2021     07/20/2021    CREATININE 1.4 (H) 07/20/2021    BUN 29 (H) 07/20/2021    CO2 25 07/20/2021    GLUCOSE 82 07/20/2021    ALT 23 07/20/2021    AST 18 07/20/2021    INR 1.0 04/30/2018     Lab Results   Component Value Date    INR 1.0 04/30/2018    INR 1.2 10/25/2016    INR 1.1 04/13/2016    PROTIME 11.0 04/30/2018    PROTIME 12.2 10/25/2016    PROTIME 11.6 04/13/2016      Lab Results   Component Value Date    TSH 0.842 07/18/2021     Lab Results   Component Value Date    TRIG 241 (H) 06/11/2021    TRIG 237 (H) 01/20/2021    TRIG 111 10/25/2016     Lab Results   Component Value Date    HDL 33 06/11/2021    HDL 27 01/20/2021    HDL 32 10/25/2016     Lab Results   Component Value Date    LDLCALC 70 06/11/2021    LDLCALC 53 01/20/2021    LDLCALC 97 10/25/2016     Lab Results   Component Value Date    LABA1C 7.2 (H) 07/18/2021       IMAGING:  CTA PULMONARY W CONTRAST    Result Date: 7/17/2021  EXAMINATION: CTA OF THE CHEST 7/17/2021 9:13 pm TECHNIQUE: CTA of the chest was performed after the administration of intravenous contrast.  Multiplanar reformatted images are provided for review. MIP images are provided for review. Dose modulation, iterative reconstruction, and/or weight based adjustment of the mA/kV was utilized to reduce the radiation dose to as low as reasonably achievable.  COMPARISON: Dorene 10, 2021 HISTORY: ORDERING SYSTEM PROVIDED HISTORY: fever, tachycardia, hypoxia.  r/o PE TECHNOLOGIST PROVIDED HISTORY: Reason for exam:->fever, tachycardia, hypoxia.  r/o PE Decision Support Exception - unselect if not a suspected or confirmed emergency medical condition->Emergency Medical Condition (MA) FINDINGS: Limited examination due to suboptimal timing of contrast.  Segmental and subsegmental pulmonary arteries are not optimally assessed. No large central pulmonary embolism. Mild cardiomegaly. No pericardial effusion. Evidence of prior mediastinal surgery. Evidence of coronary artery bypass grafting. Stable focal opacity in peripheral aspect of the left lower lobe. There is trace left pleural effusion. Prominence of pulmonary vasculature. No pneumothorax. No mediastinal or hilar lymphadenopathy. 1. Limited examination due to suboptimal timing of contrast.  Segmental and subsegmental pulmonary arteries are not optimally assessed. No large central pulmonary embolism. If clinical concern persists for pulmonary embolism, repeat exam or ventilation perfusion lung scan may be helpful for further evaluation. 2. Cardiomegaly with pulmonary vascular congestion. 3. Stable focal opacity in peripheral left lower lobe could indicate residual or recurrent pneumonia versus chronic atelectasis. Continued follow-up recommended. 4. Stable trace left pleural effusion. HOSPITAL COURSE:   Breonna Aguirre did very well throughout the hospitalization. She was found to be suffering from both pneumonia as well as a urinary tract infection. This was further complicated by bacteremia stemming from the urinary tract infection. Upon further review of the patient's medications, she does take [place] as an outpatient and this will be discontinued permanently. I have discussed this with her. She will complete a course of antibiotics as an outpatient. Her renal function has returned to baseline. Laboratory values and vital signs are otherwise stable. She is acceptable for discharge home. BRIEF PHYSICAL EXAMINATION AND LABORATORIES ON DAY OF DISCHARGE:  VITALS:  BP (!) 155/68   Pulse 80   Temp 99.1 °F (37.3 °C) (Oral)   Resp 18   Ht 5' 9\" (1.753 m)   Wt 245 lb 12.8 oz (111.5 kg)   SpO2 94%   BMI 36.30 kg/m²     HEENT:  PERRLA. EOMI. Sclera clear. Buccal mucosa moist.    Neck:  Supple. Trachea midline. No thyromegaly. No JVD. No bruits. Heart:  Rhythm regular, rate controlled. No murmurs. Lungs:  Symmetrical. Clear to auscultation bilaterally. No wheezes. No rhonchi. No rales. Abdomen: Soft. Non-tender. Non-distended. Bowel sounds positive. No organomegaly or masses. No pain on palpation    Extremities:  Peripheral pulses present. No peripheral edema. No ulcers. Neurologic:  Alert x 3. No focal deficit. Cranial nerves grossly intact. Skin:  No petechia. No hemorrhage. No wounds. DISPOSITION:  The patient's condition is good. At this time the patient is without objective evidence of an acute process requiring continuing hospitalization or inpatient management. They are stable for discharge with outpatient follow-up. I have spoken with the patient and discussed the results of the current hospitalization, in addition to providing specific details for the plan of care and counseling regarding the diagnosis and prognosis. The plan has been discussed in detail and they are aware of the specific conditions for emergent return, as well as the importance of follow-up.   Their questions are answered at this time and they are agreeable with the plan for discharge to home    DISCHARGE MEDICATIONS:    Bryon Metz   Home Medication Instructions XEZ:[id number]    Printed on:07/20/21 0912   Medication Information                      albuterol sulfate  (90 BASE) MCG/ACT inhaler  Inhale 2 puffs into the lungs every 6 hours as needed for Wheezing             aspirin 81 MG EC tablet  Take by mouth             atorvastatin (LIPITOR) 40 MG tablet  Take by mouth daily              cilostazol (PLETAL) 100 MG tablet  Take by mouth             clopidogrel (PLAVIX) 75 MG tablet  Take 75 mg by mouth daily              ergocalciferol (ERGOCALCIFEROL) 1.25 MG (20627 UT) capsule  Take by mouth once a week              escitalopram (LEXAPRO) 20 MG tablet  Take by mouth             ferrous sulfate (IRON

## 2021-07-20 NOTE — PROGRESS NOTES
Loss of IV access- occluded. Multiple attempts by 2 RNs at changing IV site without success, ICU notified and they stated someone will come up to try.

## 2021-07-20 NOTE — PLAN OF CARE
Problem: Falls - Risk of:  Goal: Will remain free from falls  Description: Will remain free from falls  7/20/2021 0007 by Martin Mishra RN  Outcome: Met This Shift     Problem: Falls - Risk of:  Goal: Absence of physical injury  Description: Absence of physical injury  7/20/2021 0007 by Martin Mishra RN  Outcome: Met This Shift     Problem: OXYGENATION/RESPIRATORY FUNCTION  Goal: Patient will maintain patent airway  7/20/2021 0007 by Martin Mishra RN  Outcome: Met This Shift     Problem: OXYGENATION/RESPIRATORY FUNCTION  Goal: Patient will achieve/maintain normal respiratory rate/effort  Description: Respiratory rate and effort will be within normal limits for the patient  7/20/2021 0007 by Martin Mishra RN  Outcome: Met This Shift     Problem: HEMODYNAMIC STATUS  Goal: Patient has stable vital signs and fluid balance  7/20/2021 0007 by Martin Mishra RN  Outcome: Met This Shift     Problem: Skin Integrity:  Goal: Will show no infection signs and symptoms  Description: Will show no infection signs and symptoms  7/20/2021 0007 by Martin Mishra RN  Outcome: Met This Shift     Problem: Skin Integrity:  Goal: Absence of new skin breakdown  Description: Absence of new skin breakdown  7/20/2021 0007 by Martin Mishra RN  Outcome: Met This Shift     Problem: Pain:  Goal: Pain level will decrease  Description: Pain level will decrease  Outcome: Met This Shift     Problem: Pain:  Goal: Control of acute pain  Description: Control of acute pain  Outcome: Met This Shift     Problem: Pain:  Goal: Control of chronic pain  Description: Control of chronic pain  Outcome: Ongoing

## 2021-07-20 NOTE — CARE COORDINATION
7/20/21 1022 CM note: COVID (-) 7/18/21. Discharge order noted. Met with patient and her daughter at the bedside to discuss discharge planning. Discharge plan remains home and patient declines [address] or any other needs. Patient is leaving for vacation with her daughter to [facility] tomorrow. Pts daughter will provide transportation home.  Electronically signed by Aj Ingram RN on 7/20/2021 at 10:24 AM

## 2021-07-20 NOTE — PROGRESS NOTES
Dr. Maria Elena Perez notified Loss of IV access and pt saying she is going to refuse IV atb tonight 'if ICU doesn't place IV soon' also updated that pt states she is leaving tomorrow (7/20/21) whether she is discharged or not. No new orders.

## 2021-07-20 NOTE — PROGRESS NOTES
CLINICAL PHARMACY NOTE: MEDS TO BEDS    Total # of Prescriptions Filled: 1   The following medications were delivered to the patient:  · Levofloxacin 750 mg    Additional Documentation:

## 2021-07-23 LAB
CULTURE, BLOOD 2: ABNORMAL
ORGANISM: ABNORMAL
ORGANISM: ABNORMAL

## 2021-08-30 NOTE — DISCHARGE SUMMARY
Internal Medicine Progress Note     [facility]     Constantine Longoria. Pola Garza., SRI.KRISTYNOSYMONE. Vinny Bush D.O., CIARA Hoyos D.O. Elijah Cruz, MSN, APRN, NP-C  Marisa Solano. Dago Sanchez, MSN, [id number] [facility]       Internal Medicine  Discharge Summary    NAME: Samantha Oliva  :  1965  MRN:  [id number]  Ruba Higgins MD  ADMITTED: 6/10/2021      DISCHARGED: 21    ADMITTING PHYSICIAN: Shannen att. providers found    CONSULTANT(S):   IP CONSULT TO CARDIOLOGY  IP CONSULT TO SOCIAL WORK  IP CONSULT TO NEPHROLOGY     ADMITTING DIAGNOSIS:   Acute on chronic respiratory failure with hypoxia ([facility] [address] [J96.21]     DISCHARGE DIAGNOSES:   1. Acute respiratory failure with hypoxia secondary to community-acquired pneumonia complicated by an exacerbation of COPD  2. NSTEMI with known extensive underlying coronary artery disease and prior coronary artery bypass grafting status post stress test evaluation with abnormal findings  3. Acute kidney injury superimposed on chronic kidney disease stage IIIa possibly secondary to acute interstitial nephritis in the setting of urine eosinophilia  4. Insulin-dependent diabetes mellitus type 2  5. Severe peripheral vascular disease with balloon angioplasty performed in January by Dr. Paco Obrien  6. Chronic compensated combined diastolic and systolic congestive heart failure with LVEF 30 to 35%  7. Severe apnea on CPAP  8. Hyperlipidemia  9. Depression    BRIEF HISTORY OF PRESENT ILLNESS:   Dinesh Cedeno is a 25-year-old female patient who presented to the emergency department feeling shaky and short of breath. She has history of COPD and felt that she was more wheezy and more dyspneic on a day-to-day basis. She is coughing and producing scant sputum but denies hemoptysis. Extensive work-up was undertaken in the emergency department. She was found to have leukocytosis but initial chest x-ray was rather unrevealing.   Troponin high-sensitivity was 06/11/2021    TRIG 237 (H) 01/20/2021    TRIG 111 10/25/2016     Lab Results   Component Value Date    HDL 33 06/11/2021    HDL 27 01/20/2021    HDL 32 10/25/2016     Lab Results   Component Value Date    LDLCALC 70 06/11/2021    LDLCALC 53 01/20/2021    LDLCALC 97 10/25/2016     Lab Results   Component Value Date    LABA1C 7.2 (H) 07/18/2021       IMAGING:  No results found. HOSPITAL COURSE:   Will Lundberg was treated accordingly for underlying pneumonia. In the setting of her elevated cardiac enzymes with abnormal stress test, cardiac catheterization was recommended. This was the plan but ultimately she opted against doing this as an inpatient and preferred to do this as an outpatient. She ultimately decided to leave [address] BRIEF PHYSICAL EXAMINATION AND LABORATORIES ON DAY OF DISCHARGE:  VITALS:  BP (!) 149/77   Pulse 77   Temp 98.3 °F (36.8 °C) (Oral)   Resp 16   Ht 5' 9\" (1.753 m)   Wt 215 lb (97.5 kg)   SpO2 92%   BMI 31.75 kg/m²     General appearance:  Alert, responsive, oriented to person, place, and time. Comfortable appearing, no distress. Head:  Normocephalic. No masses, lesions or tenderness. Eyes:  PERRLA. EOMI. Sclera clear. Buccal mucosa moist.  ENT:  Ears normal. Mucosa normal.  Neck:    Supple. Trachea midline. No thyromegaly. No JVD. No bruits. Heart:    Rhythm regular. Rate controlled. S1 and S2.  Lungs:    Symmetrical.  Diminished. Clear to auscultation bilaterally. No wheezes. No rhonchi. No rales. Abdomen:   Soft. Obese. Non-tender. Non-distended. Bowel sounds positive. No organomegaly or masses. No pain on palpation. Extremities:    Peripheral pulses present. No peripheral edema. No ulcers. No cyanosis. No clubbing. Neurologic:    Alert x 3. No focal deficit. Cranial nerves grossly intact. No focal weakness. Psych:   Behavior is normal. Mood appears normal. Speech is not rapid and/or pressured.   Musculoskeletal:   No unilateral joint edema,

## 2021-08-31 ENCOUNTER — TELEPHONE (OUTPATIENT)
Dept: CARDIOLOGY CLINIC | Age: 56
End: 2021-08-31

## 2021-08-31 NOTE — TELEPHONE ENCOUNTER
Patient was seen in the office in July - you ordered blood work to see if she's supposed to resume her entresto.   Please review and advise

## 2021-09-01 DIAGNOSIS — I50.22 CHRONIC HFREF (HEART FAILURE WITH REDUCED EJECTION FRACTION) (HCC): Primary | ICD-10-CM

## 2021-09-30 RX ORDER — METOPROLOL SUCCINATE 25 MG/1
TABLET, EXTENDED RELEASE ORAL
Qty: 90 TABLET | Refills: 3 | Status: SHIPPED
Start: 2021-09-30 | End: 2022-07-01

## 2021-09-30 RX ORDER — FUROSEMIDE 20 MG/1
TABLET ORAL
Qty: 90 TABLET | Refills: 3 | Status: SHIPPED
Start: 2021-09-30 | End: 2022-09-22

## 2022-04-14 ENCOUNTER — HOSPITAL ENCOUNTER (OUTPATIENT)
Dept: PREADMISSION TESTING | Age: 57
Discharge: HOME | End: 2022-04-14

## 2022-04-14 VITALS — WEIGHT: 250 LBS | BODY MASS INDEX: 37.89 KG/M2 | HEIGHT: 68 IN

## 2022-04-14 RX ORDER — CHOLECALCIFEROL (VITAMIN D3) 125 MCG
CAPSULE ORAL DAILY
COMMUNITY

## 2022-04-14 RX ORDER — SODIUM CHLORIDE 0.9 % (FLUSH) 0.9 %
5-40 SYRINGE (ML) INJECTION PRN
Status: CANCELLED | OUTPATIENT
Start: 2022-04-18

## 2022-04-14 RX ORDER — SODIUM CHLORIDE 0.9 % (FLUSH) 0.9 %
5-40 SYRINGE (ML) INJECTION PRN
Status: DISCONTINUED | OUTPATIENT
Start: 2022-04-14 | End: 2022-04-14

## 2022-04-14 RX ORDER — BUSPIRONE HYDROCHLORIDE 5 MG/1
5 TABLET ORAL 2 TIMES DAILY
COMMUNITY

## 2022-04-14 RX ORDER — ASCORBIC ACID 500 MG
1000 TABLET ORAL DAILY
COMMUNITY

## 2022-04-14 NOTE — PROGRESS NOTES
[address] [facility]                                                                                                                    PRE OP INSTRUCTIONS FOR  Yasmany Cuenca        Date: 4/14/2022    Date of surgery:  4/18/2022    830 am       Arrival     7am    1. Do not eat anything after midnight  prior to surgery. May have water and am meds till 430 am then npo    2. Take the following medications with a small sip of water on the morning of Surgery: bring diabetic meds take  buspar metoprolol entresto and protonix     Use inhalers and bring    3. Diabetics may take evening dose of insulin but none after midnight. If you feel symptomatic or low blood sugar morning of surgery drink 1-2 ounces of apple juice only. 4. Aspirin, Ibuprofen, Advil, Naproxen, Vitamin E and other Anti-inflammatory products should be stopped  before surgery  as directed by your physician. Take Tylenol only unless instructed otherwise by your surgeon. 5. Check with your Doctor regarding stopping Plavix, Coumadin, Lovenox, Eliquis, Effient, or other blood thinners. 6. Do not smoke,use illicit drugs and do not drink any alcoholic beverages 24 hours prior to surgery. 7. You may brush your teeth the morning of surgery. DO NOT SWALLOW WATER    8. You MUST make arrangements for a responsible adult to take you home after your surgery. You will not be allowed to leave alone or drive yourself home. It is strongly suggested someone stay with you the first 24 hrs. Your surgery will be cancelled if you do not have a ride home. 9. PEDIATRIC PATIENTS ONLY:  A parent/legal guardian must accompany a child scheduled for surgery and plan to stay at the hospital until the child is discharged. Please do not bring other children with you.     10. Please wear simple, loose fitting clothing to the hospital.  Johnna Storey not bring valuables (money, credit cards, checkbooks, etc.) Do not wear any makeup (including no eye makeup) or nail polish on your fingers or toes. 11. DO NOT wear any jewelry or piercings on day of surgery. All body piercing jewelry must be removed. 12. Shower the night before surgery with _x__Antibacterial soap /CHELSEA WIPES________    13. TOTAL JOINT REPLACEMENT/HYSTERECTOMY PATIENTS ONLY---Remember to bring Blood Bank bracelet to the hospital on the day of surgery. 14. If you have a Living Will and Durable Power of  for Healthcare, please bring in a copy. 15. If appropriate bring crutches, inspirex, WALKER, CANE etc... 12. Notify your Surgeon if you develop any illness between now and surgery time, cough, cold, fever, sore throat, nausea, vomiting, etc.  Please notify your surgeon if you experience dizziness, shortness of breath or blurred vision between now & the time of your surgery. 17. If you have ___dentures, they will be removed before going to the OR; we will provide you a container. If you wear ___contact lenses or ___glasses, they will be removed; please bring a case for them. 18. To provide excellent care visitors will be limited to 2 in the room at any given time. 19. Please bring picture ID and insurance card. 20. Sleep apnea patients need to bring CPAP AND SETTINGS to hospital on day of surgery. 21. During flu season no children under the age of 15 are permitted in the hospital for the safety of all patients. 22. Other                  Please call AMBULATORY CARE if you have any further questions.    [address] [facility]     [address]

## 2022-04-18 ENCOUNTER — HOSPITAL ENCOUNTER (OUTPATIENT)
Dept: INTERVENTIONAL RADIOLOGY/VASCULAR | Age: 57
Discharge: HOME | End: 2022-04-18
Payer: MEDICARE

## 2022-04-18 ENCOUNTER — HOSPITAL ENCOUNTER (OUTPATIENT)
Dept: CT IMAGING | Age: 57
Discharge: HOME | End: 2022-04-18
Payer: MEDICARE

## 2022-04-18 VITALS
WEIGHT: 259 LBS | RESPIRATION RATE: 16 BRPM | SYSTOLIC BLOOD PRESSURE: 140 MMHG | HEART RATE: 83 BPM | BODY MASS INDEX: 39.25 KG/M2 | DIASTOLIC BLOOD PRESSURE: 70 MMHG | OXYGEN SATURATION: 94 % | TEMPERATURE: 97.5 F | HEIGHT: 68 IN

## 2022-04-18 DIAGNOSIS — M43.27 ANKYLOSIS OF LUMBOSACRAL JOINT: ICD-10-CM

## 2022-04-18 LAB
HCT VFR BLD CALC: 44.5 % (ref 34–48)
HEMOGLOBIN: 14.4 G/DL (ref 11.5–15.5)
INR BLD: 1
METER GLUCOSE: 123 MG/DL (ref 74–99)
PLATELET # BLD: 345 E9/L (ref 130–450)
PROTHROMBIN TIME: 11.8 SEC (ref 9.3–12.4)

## 2022-04-18 PROCEDURE — 72132 CT LUMBAR SPINE W/DYE: CPT

## 2022-04-18 PROCEDURE — 85610 PROTHROMBIN TIME: CPT

## 2022-04-18 PROCEDURE — 85027 COMPLETE CBC AUTOMATED: CPT

## 2022-04-18 PROCEDURE — 6360000004 HC RX CONTRAST MEDICATION: Performed by: RADIOLOGY

## 2022-04-18 PROCEDURE — 62304 MYELOGRAPHY LUMBAR INJECTION: CPT

## 2022-04-18 PROCEDURE — 36415 COLL VENOUS BLD VENIPUNCTURE: CPT

## 2022-04-18 PROCEDURE — 82962 GLUCOSE BLOOD TEST: CPT

## 2022-04-18 RX ORDER — SODIUM CHLORIDE 0.9 % (FLUSH) 0.9 %
5-40 SYRINGE (ML) INJECTION PRN
Status: DISCONTINUED | OUTPATIENT
Start: 2022-04-18 | End: 2022-04-19 | Stop reason: HOSPADM

## 2022-04-18 RX ADMIN — IOPAMIDOL 10 ML: 408 INJECTION, SOLUTION INTRATHECAL at 09:18

## 2022-04-18 ASSESSMENT — PAIN SCALES - GENERAL
PAINLEVEL_OUTOF10: 0

## 2022-04-18 ASSESSMENT — PAIN - FUNCTIONAL ASSESSMENT: PAIN_FUNCTIONAL_ASSESSMENT: 0-10

## 2022-04-18 NOTE — PROGRESS NOTES
Patient requesting a disk of CT to take to her doctor tomorrow. Per Jaydon Felix in specials her will get a disk ready.

## 2022-04-18 NOTE — PROGRESS NOTES
Patient came to specials procedures for lumbar myelogram.    Procedure explained to patient, questions were answered. Patient positioned prone on table, secured and prepped for procedure. Emotional support given.    0901  start procedure /69  81  18  93% on room air  prone    0909  End procedure /75  86  20  92% on room air  prone    Patient tolerated procedure. bandaid place on back. Patient transferred back on cart supine.     Nurse to nurse called to [facility] spoke with [place]    Patient taken to CT scan

## 2022-05-16 NOTE — PROGRESS NOTES
[facility] Cardiology Progress Note  Dr. Bonifacio Amaya      Referring Physician: Marsha Witt MD  CHIEF COMPLAINT:   Chief Complaint   Patient presents with    Congestive Heart Failure     6 month ov- pt has complaints of sob    Coronary Artery Disease       HISTORY OF PRESENT ILLNESS:   Patient is 61year old female  with history of coronary artery disease status post multiple PCI's, status post CABG on May 31, 2882, systolic congestive heart failure, CVA, hypertension, diabetes mellitus, COPD, perforated gastric ulcer repair, s/p amputation of the second toe of the left foot, patient is here to follow-up    Shortness of breath at baseline, patient denies any chest pain, no lightheadedness, no dizziness, no palpitations, no pedal edema, no PND, no orthopnea, no syncope, no presyncopal episodes.   Functional capacity is improving        Past Medical History:   Diagnosis Date    Anxiety     CAD (coronary artery disease)     x5 stents    Cardiomyopathy ([address]     Cerebral artery occlusion with cerebral infarction ([address]     rt sided weakness  cane    COPD (chronic obstructive pulmonary disease) ([address]     Depression     Diabetes mellitus ([address]     IDDM    Hx of cardiovascular stress test 03/22/2016    Nuclear Lexiscan Stress Test  3/22/2016  6/11/2021    Hyperlipidemia     Nonrheumatic mitral (valve) insufficiency     Nonrheumatic tricuspid (valve) insufficiency     Sleep apnea     cpap 7.5    Smoker     current 1PPD X35yrs         Past Surgical History:   Procedure Laterality Date    ABDOMEN SURGERY      ABDOMINAL EXPLORATION SURGERY N/A 6-5-16    Excision perforated mid body gastric ulcer, primary closure omentum patch    BACK SURGERY      cages rods lower back    CARDIAC CATHETERIZATION  05/03/2018    Dr. Orlando Chao CORONARY ARTERY BYPASS GRAFT      2018 triple bypass    DIAGNOSTIC CARDIAC CATH LAB PROCEDURE  05/03/2018     Vivi multiple PCIs     HYSTERECTOMY      KNEE ARTHROPLASTY      TOE AMPUTATION Right 1/23/2021    RIGHT SECOND DIGIT AMPUTATION performed by Jamaica Holden DPM at [address] [place] [address],[facility] TRANSESOPHAGEAL ECHOCARDIOGRAM  10/28/2016    Dr. Spring Kaufman r/o embolic source         Current Outpatient Medications   Medication Sig Dispense Refill    busPIRone (BUSPAR) 5 MG tablet Take 5 mg by mouth 2 times daily      BIOTIN PO Take by mouth daily      Cyanocobalamin (VITAMIN B 12 PO) Take by mouth daily      TURMERIC PO Take by mouth daily      vitamin C (ASCORBIC ACID) 500 MG tablet Take 1,000 mg by mouth daily      CINNAMON PO Take by mouth daily      Cholecalciferol (VITAMIN D) 125 MCG (5000 UT) CAPS Take by mouth daily      furosemide (LASIX) 20 MG tablet TAKE 1 TABLET BY MOUTH DAILY (Patient taking differently: every other day ) 90 tablet 3    metoprolol succinate (TOPROL XL) 25 MG extended release tablet TAKE 1 TABLET BY MOUTH DAILY 90 tablet 3    aspirin 81 MG EC tablet Take by mouth Last dose  4/10      atorvastatin (LIPITOR) 40 MG tablet Take by mouth nightly       clopidogrel (PLAVIX) 75 MG tablet Take 75 mg by mouth daily Last dose 4/15      escitalopram (LEXAPRO) 20 MG tablet Take by mouth nightly Last dose 4/13      ferrous sulfate (IRON 325) 325 (65 Fe) MG tablet Take by mouth daily (with breakfast)       fluticasone-vilanterol (BREO ELLIPTA) 100-25 MCG/INH AEPB inhaler Inhale into the lungs daily       fluticasone (FLONASE) 50 MCG/ACT nasal spray SHAKE LIQUID AND USE 1 SPRAY IN EACH NOSTRIL EVERY DAY      gabapentin (NEURONTIN) 300 MG capsule Take by mouth nightly.  3 tablets once hs      LANTUS SOLOSTAR 100 UNIT/ML injection pen INJECT 42 UNITS UNDER THE SKIN EVERY MORNING AND 40 UNITS AT SUPPER      metFORMIN (GLUCOPHAGE) 1000 MG tablet Take 1,000 mg by mouth 2 times daily (with meals)       pantoprazole (PROTONIX) 40 MG tablet Take by mouth daily       sacubitril-valsartan (ENTRESTO) 24-26 MG per tablet 0.5 tablets 2 times daily       traZODone (DESYREL) 50 MG tablet Take 50 mg by mouth nightly Last dose 4/13      albuterol sulfate  (90 BASE) MCG/ACT inhaler Inhale 2 puffs into the lungs every 6 hours as needed for Wheezing       No current facility-administered medications for this visit. Allergies as of 05/17/2022 - Fully Reviewed 05/17/2022   Allergen Reaction Noted    Ace inhibitors Other (See Comments) 11/11/2016    Percocet [oxycodone-acetaminophen] Other (See Comments) 11/11/2016       Social History     Socioeconomic History    Marital status:      Spouse name: Not on file    Number of children: Not on file    Years of education: Not on file    Highest education level: Not on file   Occupational History    Not on file   Tobacco Use    Smoking status: Current Every Day Smoker     Packs/day: 1.00     Years: 43.00     Pack years: 43.00    Smokeless tobacco: Never Used   Vaping Use    Vaping Use: Some days    Substances: Nicotine, Flavoring   Substance and Sexual Activity    Alcohol use: No     Comment: 1 cups of coffee a day    Drug use: No    Sexual activity: Not Currently   Other Topics Concern    Not on file   Social History Narrative    Not on file     Social Determinants of Health     Financial Resource Strain:     Difficulty of Paying Living Expenses: Not on file   Food Insecurity:     Worried About Running Out of Food in the Last Year: Not on file    Daniel of Food in the Last Year: Not on file   Transportation Needs:     Lack of Transportation (Medical): Not on file    Lack of Transportation (Non-Medical):  Not on file   Physical Activity:     Days of Exercise per Week: Not on file    Minutes of Exercise per Session: Not on file   Stress:     Feeling of Stress : Not on file   Social Connections:     Frequency of Communication with Friends and Family: Not on file    Frequency of Social Gatherings with Friends and Family: Not on file    Attends Caodaism Services: Not on file    Active Member of Clubs or Organizations: Not on file    Attends Club or Organization Meetings: Not on file    Marital Status: Not on file   Intimate Partner Violence:     Fear of Current or Ex-Partner: Not on file    Emotionally Abused: Not on file    Physically Abused: Not on file    Sexually Abused: Not on file   Housing Stability:     Unable to Pay for Housing in the Last Year: Not on file    Number of [place] in the Last Year: Not on file    Unstable Housing in the Last Year: Not on file       Family History   Problem Relation Age of Onset    Heart Attack Father        REVIEW OF SYSTEMS:     CONSTITUTIONAL:  negative for  fevers, chills, sweats, + fatigue  HEENT:  negative for  tinnitus, earaches, nasal congestion and epistaxis  RESPIRATORY:  negative for  dry cough, cough with sputum, wheezing and hemoptysis  GASTROINTESTINAL:  negative for nausea, vomiting, diarrhea, constipation, pruritus and jaundice  HEMATOLOGIC/LYMPHATIC:  negative for easy bruising, bleeding, lymphadenopathy and petechiae  ENDOCRINE:  negative for heat intolerance, cold intolerance, tremor, hair loss and diabetic symptoms including neither polyuria nor polydipsia nor blurred vision  MUSCULOSKELETAL:  negative for  myalgias, arthralgias, joint swelling, stiff joints and decreased range of motion  NEUROLOGICAL:  negative for memory problems, speech problems, visual disturbance, dysphagia, weakness and numbness      PHYSICAL EXAM:   Constitutional:  Awake, alert cooperative, no apparent distress, and appears stated age. HEENT:  Moist and pink mucous membranes, normocephalic, without obvious abnormality, atraumatic, normal ears and nose.   Neck:  Supple, symmetrical, trachea midline, no JVD, no adenopathy, thyroid symmetric, not enlarged and no tenderness, good carotid upstroke bilaterally, no carotid bruit, skin normal  Lungs: No increased work of breathing, good air exchange, clear to auscultation bilaterally, no crackles or wheezing. Cardiovascular: Normal apical impulse, regular rate and rhythm, normal S1 and S2, no S3 or O9,1/0 systolic murmur at the apex, 2/6 systolic murmur at the left lower sternal border, trace pedal edema, good carotid upstroke bilaterally, no carotid bruit, no JVD, no abdominal pulsating masses. Abdomen: Soft, nontender, no hepatomegaly, no splenomegaly, bowel sound positive. CHEST:  Expands symmetrically, nontender to palpation, healed midsternal surgical scar. Musculoskeletal:  No clubbing or cyanosis. No redness, warmth, or swelling of the joints. Neurological: Alert, awake, and oriented X3. /72   Pulse 71   Resp 18   Ht 5' 9\" (1.753 m)   Wt 259 lb 9.6 oz (117.8 kg)   SpO2 94%   BMI 38.34 kg/m²     DATA:  I personally reviewed the visit EKG with the following interpretation: Sinus rhythm, poor R wave progression, normal axis    EKG -6/10/21 Normal sinus rhythm  Possible Left atrial enlargement  Inferior infarct (cited on or before 06-JUN-2016)  Abnormal ECG  When compared with ECG of 10-RUDDY-2021 06:28,  No significant change was found    ECHO: 6/11/21  Summary   Technically sub-optimal images - Definity Echo contrast used. Left ventricular chamber mildly dilated, 5.9cm. Moderate global hypokinesis, inferobasal akinesis, LV EF 35%. Mild left ventricular concentric hypertrophy noted. Stage II diastolic function. Left atrium is enlarged. Interatrial septum not well visualized but appears intact. Borderline right ventricular enlargement with decreased function, TAPSE   1.3. There is trace to mild mitral regurgitation   No mitral valve prolapse. No hemodynamically significant aortic stenosis. Normal tricuspid valve structure. There is trace tricuspid regurgitation, RVSP 28mmHg. Normal aortic root size. No evidence of pericardial effusion. No intra cardiac mass or thrombus.    Compared to prior echo from 5/22/2018 no significant changes noted. Stress Test: 6/11/21   The myocardial perfusion imaging was abnormal with a large moderate   intensity fixed anterolateral and apical perfusion abnormality as well   as a partially reversible lateral perfusion abnormality with   inadequate gating performed to assess wall motion, especially in the   face of concomitant attenuation but suggestive of coronary disease   with possible ischemia of the circumflex coronary distribution       Overall left ventricular systolic function and wall motion was unable   to be assessed based on the limited quality of the present gated   images.       Indeterminate risk pharmacologic myocardial perfusion imaging study   based on study quality. Angiography: 5/3/18 1. Totally occluded OM stent with a totally occluded left circumflex  artery stent with left-to-right collaterals. 2.  Moderate to severe disease involving the distal left main. 3.  Mild disease involving the right coronary artery.     Cardiology Labs: BMP:    Lab Results   Component Value Date     07/20/2021    K 4.4 07/20/2021    K 5.1 07/17/2021     07/20/2021    CO2 25 07/20/2021    BUN 29 07/20/2021    CREATININE 1.4 07/20/2021     CMP:    Lab Results   Component Value Date     07/20/2021    K 4.4 07/20/2021    K 5.1 07/17/2021     07/20/2021    CO2 25 07/20/2021    BUN 29 07/20/2021    CREATININE 1.4 07/20/2021    PROT 6.9 07/20/2021     CBC:    Lab Results   Component Value Date    WBC 8.4 07/20/2021    RBC 4.06 07/20/2021    HGB 14.4 04/18/2022    HCT 44.5 04/18/2022    MCV 83.5 07/20/2021    RDW 14.6 07/20/2021     04/18/2022     PT/INR:  No results found for: PTINR  PT/INR Warfarin:  No components found for: PTPATWAR, PTINRWAR  PTT:    Lab Results   Component Value Date    APTT 27.8 04/30/2018     PTT Heparin:  No components found for: APTTHEP  Magnesium:    Lab Results   Component Value Date    MG 2.0 07/19/2021     TSH:    Lab Results Component Value Date    TSH 0.842 07/18/2021     TROPONIN:  No components found for: TROP  BNP:    Lab Results   Component Value Date    BNP 65 06/17/2011     FASTING LIPID PANEL:    Lab Results   Component Value Date    CHOL 151 06/11/2021    HDL 33 06/11/2021    TRIG 241 06/11/2021     No orders to display     I have personally reviewed the laboratory, cardiac diagnostic and radiographic testing as outlined above:      IMPRESSION:  1: CAD: Status post CABG  In 2018, we'll obtain records from [facility], abnormal stress test in June of 2021, declined any further cardiac work-up at this point of time              2: Chronic systolic congestive heart failure: Compensated will continue current treatment. 3: Ischemic cardiomyopathy: Last documented ejection fraction was 35%, declined AICD            4: Hypertension: Controlled, continue current treatment. 5: Mitral valve regurgitation: Mild             6: Tricuspid valve regurgitation:  7: Pulmonary hypertension, unspecified:  Mild          8: Chronic obstructive pulmonary disease, unspecified          9: Cerebral infarction, unspecified             10:  Type 2 diabetes mellitus with other circulatory complication   11: Tobacco abuse: Patient was counseled regarding smoking cessation  12: Preop clearance for epidural injection          RECOMMENDATIONS:   1. Patient is at acceptable risk for perioperative cardiovascular event for the planned procedure (epidural injections), patient may proceed without any further cardiac testing. May hold aspirin and Plavix for 7 to 10 days prior to the procedure  Please feel free to call for any further information continue current treatment  2. Preventive Cardiology: low salt, low cholesterol diet, daily exercise, cholesterol goal of total cholesterol less than 200, LDL less than 70,adherence to diabetic diet, diabetic medications, smoking cessation were all advised.    3. CHF: Daily weight, use of an extra Lasix for weight gain of more than 2-3 pounds in 24 hours, compliance with diuretics, low salt diet were all advised. 4.  Follow-up with Dr. Jadon Ochoa as scheduled  5. Follow-up with Dr. Christy Palma in 6 months, sooner if symptomatic for any reason     I have reviewed my findings and recommendations with patient    Electronically signed by Richard Aggarwal MD on 5/17/2022 at 8:57 AM  NOTE: This report was transcribed using voice recognition software.  Every effort was made to ensure accuracy; however, inadvertent computerized transcription errors may be present

## 2022-05-17 ENCOUNTER — OFFICE VISIT (OUTPATIENT)
Dept: CARDIOLOGY CLINIC | Age: 57
End: 2022-05-17
Payer: MEDICARE

## 2022-05-17 VITALS
BODY MASS INDEX: 38.45 KG/M2 | HEART RATE: 71 BPM | HEIGHT: 69 IN | WEIGHT: 259.6 LBS | SYSTOLIC BLOOD PRESSURE: 126 MMHG | RESPIRATION RATE: 18 BRPM | OXYGEN SATURATION: 94 % | DIASTOLIC BLOOD PRESSURE: 72 MMHG

## 2022-05-17 DIAGNOSIS — I50.22 CHRONIC HFREF (HEART FAILURE WITH REDUCED EJECTION FRACTION) (HCC): Primary | ICD-10-CM

## 2022-05-17 PROCEDURE — G8427 DOCREV CUR MEDS BY ELIG CLIN: HCPCS | Performed by: INTERNAL MEDICINE

## 2022-05-17 PROCEDURE — 3017F COLORECTAL CA SCREEN DOC REV: CPT | Performed by: INTERNAL MEDICINE

## 2022-05-17 PROCEDURE — G8417 CALC BMI ABV UP PARAM F/U: HCPCS | Performed by: INTERNAL MEDICINE

## 2022-05-17 PROCEDURE — 4004F PT TOBACCO SCREEN RCVD TLK: CPT | Performed by: INTERNAL MEDICINE

## 2022-05-17 PROCEDURE — 93000 ELECTROCARDIOGRAM COMPLETE: CPT | Performed by: INTERNAL MEDICINE

## 2022-05-17 PROCEDURE — 99214 OFFICE O/P EST MOD 30 MIN: CPT | Performed by: INTERNAL MEDICINE

## 2022-06-20 RX ORDER — SACUBITRIL AND VALSARTAN 24; 26 MG/1; MG/1
TABLET, FILM COATED ORAL
Qty: 90 TABLET | Refills: 3 | Status: SHIPPED | OUTPATIENT
Start: 2022-06-20

## 2022-07-01 RX ORDER — METOPROLOL SUCCINATE 25 MG/1
TABLET, EXTENDED RELEASE ORAL
Qty: 90 TABLET | Refills: 3 | Status: SHIPPED | OUTPATIENT
Start: 2022-07-01

## 2022-09-22 RX ORDER — FUROSEMIDE 20 MG/1
TABLET ORAL
Qty: 90 TABLET | Refills: 3 | Status: SHIPPED | OUTPATIENT
Start: 2022-09-22

## 2023-02-14 ENCOUNTER — OFFICE VISIT (OUTPATIENT)
Dept: CARDIOLOGY CLINIC | Age: 58
End: 2023-02-14
Payer: MEDICARE

## 2023-02-14 VITALS
BODY MASS INDEX: 36.88 KG/M2 | RESPIRATION RATE: 16 BRPM | WEIGHT: 249 LBS | HEIGHT: 69 IN | DIASTOLIC BLOOD PRESSURE: 68 MMHG | HEART RATE: 94 BPM | SYSTOLIC BLOOD PRESSURE: 130 MMHG

## 2023-02-14 DIAGNOSIS — I50.22 CHRONIC HFREF (HEART FAILURE WITH REDUCED EJECTION FRACTION) (HCC): Primary | ICD-10-CM

## 2023-02-14 PROCEDURE — G8417 CALC BMI ABV UP PARAM F/U: HCPCS | Performed by: INTERNAL MEDICINE

## 2023-02-14 PROCEDURE — 99214 OFFICE O/P EST MOD 30 MIN: CPT | Performed by: INTERNAL MEDICINE

## 2023-02-14 PROCEDURE — G8427 DOCREV CUR MEDS BY ELIG CLIN: HCPCS | Performed by: INTERNAL MEDICINE

## 2023-02-14 PROCEDURE — G8484 FLU IMMUNIZE NO ADMIN: HCPCS | Performed by: INTERNAL MEDICINE

## 2023-02-14 PROCEDURE — 93000 ELECTROCARDIOGRAM COMPLETE: CPT | Performed by: INTERNAL MEDICINE

## 2023-02-14 PROCEDURE — 3017F COLORECTAL CA SCREEN DOC REV: CPT | Performed by: INTERNAL MEDICINE

## 2023-02-14 PROCEDURE — 4004F PT TOBACCO SCREEN RCVD TLK: CPT | Performed by: INTERNAL MEDICINE

## 2023-02-14 RX ORDER — DULOXETIN HYDROCHLORIDE 60 MG/1
CAPSULE, DELAYED RELEASE ORAL
COMMUNITY
Start: 2022-12-14

## 2023-02-14 RX ORDER — INSULIN DETEMIR 100 [IU]/ML
INJECTION, SOLUTION SUBCUTANEOUS
COMMUNITY
Start: 2023-02-10

## 2023-02-14 RX ORDER — BUDESONIDE, GLYCOPYRROLATE, AND FORMOTEROL FUMARATE 160; 9; 4.8 UG/1; UG/1; UG/1
AEROSOL, METERED RESPIRATORY (INHALATION)
COMMUNITY
Start: 2023-02-07

## 2023-02-15 NOTE — PROGRESS NOTES
[facility] Cardiology Progress Note  Dr. Herber Urbina      Referring Physician: Shey Art MD  CHIEF COMPLAINT:   Chief Complaint   Patient presents with    Coronary Artery Disease     Cardiac clearance        HISTORY OF PRESENT ILLNESS:   Patient is 60year old female  with history of coronary artery disease status post multiple PCI's, status post CABG on May 31, 4961, systolic congestive heart failure, CVA, hypertension, diabetes mellitus, COPD, perforated gastric ulcer repair, s/p amputation of the second toe of the left foot, patient is here to follow-up    Shortness of breath at baseline, patient denies any chest pain, no lightheadedness, no dizziness, no palpitations, no pedal edema, no PND, no orthopnea, no syncope, no presyncopal episodes.   Functional capacity is at baseline    Past Medical History:   Diagnosis Date    Anxiety     CAD (coronary artery disease)     x5 stents    Cardiomyopathy ([address]     Cerebral artery occlusion with cerebral infarction ([address]     rt sided weakness  cane    COPD (chronic obstructive pulmonary disease) ([address]     Depression     Diabetes mellitus ([address]     IDDM    Hx of cardiovascular stress test 03/22/2016    Nuclear Lexiscan Stress Test  3/22/2016  6/11/2021    Hyperlipidemia     Nonrheumatic mitral (valve) insufficiency     Nonrheumatic tricuspid (valve) insufficiency     Sleep apnea     cpap 7.5    Smoker     current 1PPD X35yrs         Past Surgical History:   Procedure Laterality Date    ABDOMEN SURGERY      ABDOMINAL EXPLORATION SURGERY N/A 6-5-16    Excision perforated mid body gastric ulcer, primary closure omentum patch    BACK SURGERY      cages rods lower back    CARDIAC CATHETERIZATION  05/03/2018    Dr. Boubacar Salmeron GRAFT      2018 triple bypass    DIAGNOSTIC CARDIAC CATH LAB PROCEDURE  05/03/2018    Dr. Saul Frederick multiple PCIs     HYSTERECTOMY (CERVIX STATUS UNKNOWN)      KNEE ARTHROPLASTY      TOE AMPUTATION Right 1/23/2021    RIGHT SECOND DIGIT AMPUTATION performed by Jones Davis DPM at [address]    TRANSESOPHAGEAL ECHOCARDIOGRAM  10/28/2016    Dr. John Cleaning r/o embolic source         Current Outpatient Medications   Medication Sig Dispense Refill    BREJUNOTRI AEROSPHERE 160-9-4.8 MCG/ACT AERO INHALE 2 PUFFS BY MOUTH TWICE DAILY      DULoxetine (CYMBALTA) 60 MG extended release capsule TAKE 1 CAPSULE BY MOUTH EVERY DAY      LEVEMIR FLEXTOUCH 100 UNIT/ML injection pen INJECT 56 UNITS UNDER THE SKIN EVERY MORNING AND 46 UNITS EVERY NIGHT AT BEDTIME      furosemide (LASIX) 20 MG tablet TAKE 1 TABLET BY MOUTH DAILY 90 tablet 3    metoprolol succinate (TOPROL XL) 25 MG extended release tablet TAKE 1 TABLET BY MOUTH DAILY 90 tablet 3    ENTRESTO 24-26 MG per tablet TAKE HALF A TABLET BY MOUTH TWICE DAILY 90 tablet 3    atorvastatin (LIPITOR) 40 MG tablet Take by mouth nightly       clopidogrel (PLAVIX) 75 MG tablet Take 75 mg by mouth daily Last dose 4/15      fluticasone-vilanterol (BREO ELLIPTA) 100-25 MCG/INH AEPB inhaler Inhale into the lungs daily       fluticasone (FLONASE) 50 MCG/ACT nasal spray SHAKE LIQUID AND USE 1 SPRAY IN EACH NOSTRIL EVERY DAY      gabapentin (NEURONTIN) 300 MG capsule Take by mouth nightly.  3 tablets once hs      metFORMIN (GLUCOPHAGE) 1000 MG tablet Take 1,000 mg by mouth 2 times daily (with meals)       pantoprazole (PROTONIX) 40 MG tablet Take by mouth daily       traZODone (DESYREL) 50 MG tablet Take 50 mg by mouth nightly Last dose 4/13      albuterol sulfate  (90 BASE) MCG/ACT inhaler Inhale 2 puffs into the lungs every 6 hours as needed for Wheezing      busPIRone (BUSPAR) 5 MG tablet Take 5 mg by mouth 2 times daily      BIOTIN PO Take by mouth daily      Cyanocobalamin (VITAMIN B 12 PO) Take by mouth daily      TURMERIC PO Take by mouth daily      vitamin C (ASCORBIC ACID) 500 MG tablet Take 1,000 mg by mouth daily      CINNAMON PO Take by mouth daily      Cholecalciferol (VITAMIN D) 125 MCG (5000 UT) CAPS Take by mouth daily      aspirin 81 MG EC tablet Take by mouth Last dose  4/10      escitalopram (LEXAPRO) 20 MG tablet Take by mouth nightly Last dose 4/13 (Patient not taking: Reported on 2/14/2023)      ferrous sulfate (IRON 325) 325 (65 Fe) MG tablet Take by mouth daily (with breakfast)  (Patient not taking: Reported on 2/14/2023)      LANTUS SOLOSTAR 100 UNIT/ML injection pen INJECT 42 UNITS UNDER THE SKIN EVERY MORNING AND 40 UNITS AT SUPPER (Patient not taking: Reported on 2/14/2023)       No current facility-administered medications for this visit.          Allergies as of 02/14/2023 - Fully Reviewed 02/14/2023   Allergen Reaction Noted    Ace inhibitors Other (See Comments) 11/11/2016    Percocet [oxycodone-acetaminophen] Other (See Comments) 11/11/2016       Social History     Socioeconomic History    Marital status:      Spouse name: Not on file    Number of children: Not on file    Years of education: Not on file    Highest education level: Not on file   Occupational History    Not on file   Tobacco Use    Smoking status: Every Day     Packs/day: 1.00     Years: 43.00     Pack years: 43.00     Types: Cigarettes    Smokeless tobacco: Never   Vaping Use    Vaping Use: Some days    Substances: Nicotine, Flavoring   Substance and Sexual Activity    Alcohol use: No     Comment: 1 cups of coffee a day    Drug use: No    Sexual activity: Not Currently   Other Topics Concern    Not on file   Social History Narrative    Not on file     Social Determinants of Health     Financial Resource Strain: Not on file   Food Insecurity: Not on file   Transportation Needs: Not on file   Physical Activity: Not on file   Stress: Not on file   Social Connections: Not on file   Intimate Partner Violence: Not on file   Housing Stability: Not on file       Family History   Problem Relation Age of Onset    Heart Attack Father        REVIEW OF SYSTEMS: CONSTITUTIONAL:  negative for  fevers, chills, sweats, + fatigue  HEENT:  negative for  tinnitus, earaches, nasal congestion and epistaxis  RESPIRATORY:  negative for  dry cough, cough with sputum, wheezing and hemoptysis  GASTROINTESTINAL:  negative for nausea, vomiting, diarrhea, constipation, pruritus and jaundice  HEMATOLOGIC/LYMPHATIC:  negative for easy bruising, bleeding, lymphadenopathy and petechiae  ENDOCRINE:  negative for heat intolerance, cold intolerance, tremor, hair loss and diabetic symptoms including neither polyuria nor polydipsia nor blurred vision  MUSCULOSKELETAL:  negative for  myalgias, arthralgias, joint swelling, stiff joints and decreased range of motion  NEUROLOGICAL:  negative for memory problems, speech problems, visual disturbance, dysphagia, weakness and numbness      PHYSICAL EXAM:   Constitutional:  Awake, alert cooperative, no apparent distress, and appears stated age. HEENT:  Moist and pink mucous membranes, normocephalic, without obvious abnormality, atraumatic, normal ears and nose. Neck:  Supple, symmetrical, trachea midline, no JVD, no adenopathy, thyroid symmetric, not enlarged and no tenderness, good carotid upstroke bilaterally, no carotid bruit, skin normal  Lungs: No increased work of breathing, good air exchange, clear to auscultation bilaterally, no crackles or wheezing. Cardiovascular: Normal apical impulse, regular rate and rhythm, normal S1 and S2, no S3 or S9,5/8 systolic murmur at the apex, 2/6 systolic murmur at the left lower sternal border, trace pedal edema, good carotid upstroke bilaterally, no carotid bruit, no JVD, no abdominal pulsating masses. Abdomen: Soft, nontender, no hepatomegaly, no splenomegaly, bowel sound positive. CHEST:  Expands symmetrically, nontender to palpation, healed midsternal surgical scar. Musculoskeletal:  No clubbing or cyanosis. No redness, warmth, or swelling of the joints.   Neurological: Alert, awake, and oriented X3.      /68   Pulse 94   Resp 16   Ht 5' 9\" (1.753 m)   Wt 249 lb (112.9 kg)   BMI 36.77 kg/m²     DATA:  I personally reviewed the visit EKG with the following interpretation: Sinus rhythm, normal axis, normal EKG    EKG 5/17/2022, sinus rhythm, poor R wave progression, normal axis    EKG -6/10/21 Normal sinus rhythm  Possible Left atrial enlargement  Inferior infarct (cited on or before 06-JUN-2016)  Abnormal ECG  When compared with ECG of 10-RUDDY-2021 06:28,  No significant change was found    ECHO: 6/11/21  Summary   Technically sub-optimal images - Definity Echo contrast used. Left ventricular chamber mildly dilated, 5.9cm. Moderate global hypokinesis, inferobasal akinesis, LV EF 35%. Mild left ventricular concentric hypertrophy noted. Stage II diastolic function. Left atrium is enlarged. Interatrial septum not well visualized but appears intact. Borderline right ventricular enlargement with decreased function, TAPSE   1.3. There is trace to mild mitral regurgitation   No mitral valve prolapse. No hemodynamically significant aortic stenosis. Normal tricuspid valve structure. There is trace tricuspid regurgitation, RVSP 28mmHg. Normal aortic root size. No evidence of pericardial effusion. No intra cardiac mass or thrombus. Compared to prior echo from 5/22/2018 no significant changes noted. Stress Test: 6/11/21   The myocardial perfusion imaging was abnormal with a large moderate   intensity fixed anterolateral and apical perfusion abnormality as well   as a partially reversible lateral perfusion abnormality with   inadequate gating performed to assess wall motion, especially in the   face of concomitant attenuation but suggestive of coronary disease   with possible ischemia of the circumflex coronary distribution       Overall left ventricular systolic function and wall motion was unable   to be assessed based on the limited quality of the present gated   images. Indeterminate risk pharmacologic myocardial perfusion imaging study   based on study quality. Angiography: 5/3/18 1. Totally occluded OM stent with a totally occluded left circumflex  artery stent with left-to-right collaterals. 2.  Moderate to severe disease involving the distal left main. 3.  Mild disease involving the right coronary artery.     Cardiology Labs: BMP:    Lab Results   Component Value Date/Time     07/20/2021 04:38 AM    K 4.4 07/20/2021 04:38 AM    K 5.1 07/17/2021 08:16 PM     07/20/2021 04:38 AM    CO2 25 07/20/2021 04:38 AM    BUN 29 07/20/2021 04:38 AM    CREATININE 1.4 07/20/2021 04:38 AM     CMP:    Lab Results   Component Value Date/Time     07/20/2021 04:38 AM    K 4.4 07/20/2021 04:38 AM    K 5.1 07/17/2021 08:16 PM     07/20/2021 04:38 AM    CO2 25 07/20/2021 04:38 AM    BUN 29 07/20/2021 04:38 AM    CREATININE 1.4 07/20/2021 04:38 AM    PROT 6.9 07/20/2021 04:38 AM     CBC:    Lab Results   Component Value Date/Time    WBC 8.4 07/20/2021 04:38 AM    RBC 4.06 07/20/2021 04:38 AM    HGB 14.4 04/18/2022 07:34 AM    HCT 44.5 04/18/2022 07:34 AM    MCV 83.5 07/20/2021 04:38 AM    RDW 14.6 07/20/2021 04:38 AM     04/18/2022 07:34 AM     PT/INR:  No results found for: PTINR  PT/INR Warfarin:  No components found for: PTPATWAR, PTINRWAR  PTT:    Lab Results   Component Value Date/Time    APTT 27.8 04/30/2018 12:05 PM     PTT Heparin:  No components found for: APTTHEP  Magnesium:    Lab Results   Component Value Date/Time    MG 2.0 07/19/2021 05:40 AM     TSH:    Lab Results   Component Value Date/Time    TSH 0.842 07/18/2021 04:30 AM     TROPONIN:  No components found for: TROP  BNP:    Lab Results   Component Value Date/Time    BNP 65 06/17/2011 09:49 AM     FASTING LIPID PANEL:    Lab Results   Component Value Date/Time    CHOL 151 06/11/2021 02:25 PM    HDL 33 06/11/2021 02:25 PM    TRIG 241 06/11/2021 02:25 PM     No orders to display     I have personally reviewed the laboratory, cardiac diagnostic and radiographic testing as outlined above:      IMPRESSION:  1: CAD: Status post CABG  In 2018, we'll obtain records from [facility], abnormal stress test in June of 2021, declined any further cardiac work-up at this point of time              2: Chronic systolic congestive heart failure: Compensated will continue current treatment. 3: Ischemic cardiomyopathy: Last documented ejection fraction was 35%, declined AICD            4: Hypertension: Controlled, continue current treatment. 5: Mitral valve regurgitation: Mild             6: Tricuspid valve regurgitation:  7: Pulmonary hypertension:  Mild          8: Chronic obstructive pulmonary disease, unspecified          9: Cerebral infarction, unspecified             10: Type 2 diabetes mellitus with other circulatory complication   11: Tobacco abuse: Patient was counseled regarding smoking cessation  12: Preop clearance for epidural injection          RECOMMENDATIONS:   1. Patient is at acceptable risk for perioperative cardiovascular event for the planned procedure (toe amputation), patient may proceed without any further cardiac testing. May hold aspirin and Plavix for 7 to 10 days prior to the procedure  Please feel free to call for any further information continue current treatment  2. Preventive Cardiology: low salt, low cholesterol diet, daily exercise, cholesterol goal of total cholesterol less than 200, LDL less than 70,adherence to diabetic diet, diabetic medications, smoking cessation were all advised. 3. CHF: Daily weight, use of an extra Lasix for weight gain of more than 2-3 pounds in 24 hours, compliance with diuretics, low salt diet were all advised. 4.  Follow-up with Dr. Tavo Miller as scheduled  5.   Follow-up with Dr. Spencer Mack in 6 months, sooner if symptomatic for any reason     I have reviewed my findings and recommendations with patient    Electronically signed by Dola Siemens, MD on 2/14/2023 at 7:02 PM  NOTE: This report was transcribed using voice recognition software.  Every effort was made to ensure accuracy; however, inadvertent computerized transcription errors may be present

## 2023-02-21 RX ORDER — SODIUM CHLORIDE 9 MG/ML
INJECTION, SOLUTION INTRAVENOUS CONTINUOUS
Status: CANCELLED | OUTPATIENT
Start: 2023-02-23

## 2023-02-22 ENCOUNTER — HOSPITAL ENCOUNTER (OUTPATIENT)
Dept: PREADMISSION TESTING | Age: 58
Discharge: HOME | End: 2023-02-22
Payer: MEDICARE

## 2023-02-22 VITALS
BODY MASS INDEX: 36.29 KG/M2 | DIASTOLIC BLOOD PRESSURE: 75 MMHG | RESPIRATION RATE: 18 BRPM | SYSTOLIC BLOOD PRESSURE: 141 MMHG | OXYGEN SATURATION: 95 % | WEIGHT: 245 LBS | HEART RATE: 96 BPM | TEMPERATURE: 96.7 F | HEIGHT: 69 IN

## 2023-02-22 DIAGNOSIS — Z01.818 PREOPERATIVE TESTING: Primary | ICD-10-CM

## 2023-02-22 LAB
ANION GAP SERPL CALCULATED.3IONS-SCNC: 9 MMOL/L (ref 7–16)
BASOPHILS ABSOLUTE: 0.05 E9/L (ref 0–0.2)
BASOPHILS RELATIVE PERCENT: 0.6 % (ref 0–2)
BUN BLDV-MCNC: 21 MG/DL (ref 6–20)
CALCIUM SERPL-MCNC: 9.3 MG/DL (ref 8.6–10.2)
CHLORIDE BLD-SCNC: 97 MMOL/L (ref 98–107)
CO2: 29 MMOL/L (ref 22–29)
CREAT SERPL-MCNC: 1.1 MG/DL (ref 0.5–1)
EOSINOPHILS ABSOLUTE: 0.18 E9/L (ref 0.05–0.5)
EOSINOPHILS RELATIVE PERCENT: 2.1 % (ref 0–6)
GFR SERPL CREATININE-BSD FRML MDRD: 58 ML/MIN/1.73
GLUCOSE BLD-MCNC: 238 MG/DL (ref 74–99)
HCT VFR BLD CALC: 42.7 % (ref 34–48)
HEMOGLOBIN: 13.7 G/DL (ref 11.5–15.5)
IMMATURE GRANULOCYTES #: 0.03 E9/L
IMMATURE GRANULOCYTES %: 0.3 % (ref 0–5)
LYMPHOCYTES ABSOLUTE: 1.67 E9/L (ref 1.5–4)
LYMPHOCYTES RELATIVE PERCENT: 19.2 % (ref 20–42)
MCH RBC QN AUTO: 28.6 PG (ref 26–35)
MCHC RBC AUTO-ENTMCNC: 32.1 % (ref 32–34.5)
MCV RBC AUTO: 89.1 FL (ref 80–99.9)
MONOCYTES ABSOLUTE: 0.61 E9/L (ref 0.1–0.95)
MONOCYTES RELATIVE PERCENT: 7 % (ref 2–12)
NEUTROPHILS ABSOLUTE: 6.15 E9/L (ref 1.8–7.3)
NEUTROPHILS RELATIVE PERCENT: 70.8 % (ref 43–80)
PDW BLD-RTO: 14.5 FL (ref 11.5–15)
PLATELET # BLD: 331 E9/L (ref 130–450)
PMV BLD AUTO: 9.7 FL (ref 7–12)
POTASSIUM REFLEX MAGNESIUM: 5.7 MMOL/L (ref 3.5–5)
RBC # BLD: 4.79 E12/L (ref 3.5–5.5)
SODIUM BLD-SCNC: 135 MMOL/L (ref 132–146)
WBC # BLD: 8.7 E9/L (ref 4.5–11.5)

## 2023-02-22 PROCEDURE — 80048 BASIC METABOLIC PNL TOTAL CA: CPT

## 2023-02-22 PROCEDURE — 85025 COMPLETE CBC W/AUTO DIFF WBC: CPT

## 2023-02-22 PROCEDURE — 36415 COLL VENOUS BLD VENIPUNCTURE: CPT

## 2023-02-22 ASSESSMENT — PAIN DESCRIPTION - ONSET: ONSET: ON-GOING

## 2023-02-22 ASSESSMENT — PAIN DESCRIPTION - FREQUENCY: FREQUENCY: CONTINUOUS

## 2023-02-22 ASSESSMENT — PAIN SCALES - GENERAL: PAINLEVEL_OUTOF10: 9

## 2023-02-22 ASSESSMENT — PAIN DESCRIPTION - PAIN TYPE: TYPE: CHRONIC PAIN

## 2023-02-22 ASSESSMENT — PAIN DESCRIPTION - LOCATION: LOCATION: OTHER (COMMENT)

## 2023-02-22 NOTE — PROGRESS NOTES
[facility]                                                                                                                    PRE OP INSTRUCTIONS FOR  Joshua Drain        Date: 2/22/2023    Date of surgery: 2/23/23   Arrival Time: Hospital will call you between 5pm and 7pm with your final arrival time for surgery    Do not eat or drink anything after midnight prior to surgery. This includes no water, chewing gum, mints or ice chips. Take the following medications with a small sip of water on the morning of Surgery: metoprolol,entresto, inhalers, bring inhaler, may take buspar, cymbalata,protonix,nasal spray    Diabetics may take evening dose of insulin but none after midnight. If you feel symptomatic or low blood sugar morning of surgery drink 1-2 ounces of apple juice only. Aspirin, Ibuprofen, Advil, Naproxen, Vitamin E and other Anti-inflammatory products should be stopped  before surgery  as directed by your physician. Take Tylenol only unless instructed otherwise by your surgeon. Check with your Doctor regarding stopping Plavix, Coumadin, Lovenox, Eliquis, Effient, or other blood thinners. Do not smoke,use illicit drugs and do not drink any alcoholic beverages 24 hours prior to surgery. You may brush your teeth the morning of surgery. DO NOT SWALLOW WATER    You MUST make arrangements for a responsible adult to take you home after your surgery. You will not be allowed to leave alone or drive yourself home. It is strongly suggested someone stay with you the first 24 hrs. Your surgery will be cancelled if you do not have a ride home. PEDIATRIC PATIENTS ONLY:  A parent/legal guardian must accompany a child scheduled for surgery and plan to stay at the hospital until the child is discharged. Please do not bring other children with you.     Please wear simple, loose fitting clothing to the hospital.  Do not bring valuables (money, credit cards, checkbooks, etc.) Do not wear any makeup (including no eye makeup) or nail polish on your fingers or toes. DO NOT wear any jewelry or piercings on day of surgery. All body piercing jewelry must be removed. Shower the night before surgery with ___Antibacterial soap /CHELSEA WIPES_____x___      If you have a Living Will and Durable Power of  for Healthcare, please bring in a copy. If appropriate bring crutches, inspirex, WALKER, CANE etc... Notify your Surgeon if you develop any illness between now and surgery time, cough, cold, fever, sore throat, nausea, vomiting, etc.  Please notify your surgeon if you experience dizziness, shortness of breath or blurred vision between now & the time of your surgery. If you have ___dentures, they will be removed before going to the OR; we will provide you a container. If you wear ___contact lenses or ___glasses, they will be removed; please bring a case for them. To provide excellent care visitors will be limited to 2 in the room at any given time. Please bring picture ID and insurance card. Sleep apnea patients need to know CPAP  SETTINGS                                                                                          During flu season no children under the age of 15 are permitted in the hospital for the safety of all patients. Other                Please call AMBULATORY CARE if you have any further questions.    [address] [facility]     [address]

## 2023-02-22 NOTE — PROGRESS NOTES
Notified dr Olivier Bhakta patient stopped plavix and aspirin, last dose 2/20/23, ok per dr Olivier Bhakta. After speaking with dr Olivier Bhakta, patient states last dose was 2/17/23. Faxed bmp results to dr sparrow and dr Teresita Saleem offices, confirmations received. Notified dr Olivier Bhakta of bmp results, orders obtained.

## 2023-02-23 ENCOUNTER — ANESTHESIA EVENT (OUTPATIENT)
Dept: OPERATING ROOM | Age: 58
End: 2023-02-23
Payer: MEDICARE

## 2023-02-23 ENCOUNTER — ANESTHESIA (OUTPATIENT)
Dept: OPERATING ROOM | Age: 58
End: 2023-02-23
Payer: MEDICARE

## 2023-02-23 ENCOUNTER — HOSPITAL ENCOUNTER (OUTPATIENT)
Age: 58
Setting detail: OUTPATIENT SURGERY
Discharge: HOME | End: 2023-02-23
Attending: PODIATRIST | Admitting: PODIATRIST
Payer: MEDICARE

## 2023-02-23 VITALS
SYSTOLIC BLOOD PRESSURE: 118 MMHG | DIASTOLIC BLOOD PRESSURE: 58 MMHG | RESPIRATION RATE: 16 BRPM | HEART RATE: 90 BPM | TEMPERATURE: 97.1 F | OXYGEN SATURATION: 90 %

## 2023-02-23 DIAGNOSIS — G89.18 POSTOPERATIVE PAIN: Primary | ICD-10-CM

## 2023-02-23 DIAGNOSIS — M86.172 OSTEOMYELITIS OF ANKLE OR FOOT, LEFT, ACUTE (HCC): ICD-10-CM

## 2023-02-23 LAB
ANION GAP SERPL CALCULATED.3IONS-SCNC: 11 MMOL/L (ref 7–16)
BUN BLDV-MCNC: 19 MG/DL (ref 6–20)
CALCIUM SERPL-MCNC: 9.1 MG/DL (ref 8.6–10.2)
CHLORIDE BLD-SCNC: 100 MMOL/L (ref 98–107)
CO2: 29 MMOL/L (ref 22–29)
CREAT SERPL-MCNC: 1.2 MG/DL (ref 0.5–1)
GFR SERPL CREATININE-BSD FRML MDRD: 52 ML/MIN/1.73
GLUCOSE BLD-MCNC: 188 MG/DL (ref 74–99)
POTASSIUM REFLEX MAGNESIUM: 5 MMOL/L (ref 3.5–5)
SODIUM BLD-SCNC: 140 MMOL/L (ref 132–146)

## 2023-02-23 PROCEDURE — 2580000003 HC RX 258: Performed by: NURSE ANESTHETIST, CERTIFIED REGISTERED

## 2023-02-23 PROCEDURE — 88311 DECALCIFY TISSUE: CPT

## 2023-02-23 PROCEDURE — 3600000012 HC SURGERY LEVEL 2 ADDTL 15MIN: Performed by: PODIATRIST

## 2023-02-23 PROCEDURE — 2500000003 HC RX 250 WO HCPCS: Performed by: PODIATRIST

## 2023-02-23 PROCEDURE — 2709999900 HC NON-CHARGEABLE SUPPLY: Performed by: PODIATRIST

## 2023-02-23 PROCEDURE — 80048 BASIC METABOLIC PNL TOTAL CA: CPT

## 2023-02-23 PROCEDURE — 36415 COLL VENOUS BLD VENIPUNCTURE: CPT

## 2023-02-23 PROCEDURE — 6360000002 HC RX W HCPCS: Performed by: NURSE ANESTHETIST, CERTIFIED REGISTERED

## 2023-02-23 PROCEDURE — 3600000002 HC SURGERY LEVEL 2 BASE: Performed by: PODIATRIST

## 2023-02-23 PROCEDURE — 3700000001 HC ADD 15 MINUTES (ANESTHESIA): Performed by: PODIATRIST

## 2023-02-23 PROCEDURE — 2580000003 HC RX 258: Performed by: INTERNAL MEDICINE

## 2023-02-23 PROCEDURE — 7100000011 HC PHASE II RECOVERY - ADDTL 15 MIN: Performed by: PODIATRIST

## 2023-02-23 PROCEDURE — 7100000010 HC PHASE II RECOVERY - FIRST 15 MIN: Performed by: PODIATRIST

## 2023-02-23 PROCEDURE — 88305 TISSUE EXAM BY PATHOLOGIST: CPT

## 2023-02-23 PROCEDURE — 6370000000 HC RX 637 (ALT 250 FOR IP): Performed by: ANESTHESIOLOGY

## 2023-02-23 PROCEDURE — 3700000000 HC ANESTHESIA ATTENDED CARE: Performed by: PODIATRIST

## 2023-02-23 RX ORDER — MIDAZOLAM HYDROCHLORIDE 1 MG/ML
2 INJECTION INTRAMUSCULAR; INTRAVENOUS
Status: DISCONTINUED | OUTPATIENT
Start: 2023-02-23 | End: 2023-02-23 | Stop reason: HOSPADM

## 2023-02-23 RX ORDER — MEPERIDINE HYDROCHLORIDE 25 MG/ML
12.5 INJECTION INTRAMUSCULAR; INTRAVENOUS; SUBCUTANEOUS EVERY 5 MIN PRN
Status: DISCONTINUED | OUTPATIENT
Start: 2023-02-23 | End: 2023-02-23 | Stop reason: HOSPADM

## 2023-02-23 RX ORDER — PROCHLORPERAZINE EDISYLATE 5 MG/ML
5 INJECTION INTRAMUSCULAR; INTRAVENOUS
Status: DISCONTINUED | OUTPATIENT
Start: 2023-02-23 | End: 2023-02-23 | Stop reason: HOSPADM

## 2023-02-23 RX ORDER — KETOROLAC TROMETHAMINE 30 MG/ML
30 INJECTION, SOLUTION INTRAMUSCULAR; INTRAVENOUS
Status: DISCONTINUED | OUTPATIENT
Start: 2023-02-23 | End: 2023-02-23 | Stop reason: HOSPADM

## 2023-02-23 RX ORDER — IPRATROPIUM BROMIDE AND ALBUTEROL SULFATE 2.5; .5 MG/3ML; MG/3ML
1 SOLUTION RESPIRATORY (INHALATION)
Status: DISCONTINUED | OUTPATIENT
Start: 2023-02-23 | End: 2023-02-23 | Stop reason: HOSPADM

## 2023-02-23 RX ORDER — LABETALOL HYDROCHLORIDE 5 MG/ML
10 INJECTION, SOLUTION INTRAVENOUS
Status: DISCONTINUED | OUTPATIENT
Start: 2023-02-23 | End: 2023-02-23 | Stop reason: HOSPADM

## 2023-02-23 RX ORDER — SODIUM CHLORIDE 9 MG/ML
INJECTION, SOLUTION INTRAVENOUS CONTINUOUS
Status: DISCONTINUED | OUTPATIENT
Start: 2023-02-23 | End: 2023-02-23 | Stop reason: HOSPADM

## 2023-02-23 RX ORDER — MORPHINE SULFATE 2 MG/ML
2 INJECTION, SOLUTION INTRAMUSCULAR; INTRAVENOUS EVERY 5 MIN PRN
Status: DISCONTINUED | OUTPATIENT
Start: 2023-02-23 | End: 2023-02-23 | Stop reason: HOSPADM

## 2023-02-23 RX ORDER — CEFAZOLIN 2 G/1
INJECTION, POWDER, FOR SOLUTION INTRAMUSCULAR; INTRAVENOUS PRN
Status: DISCONTINUED | OUTPATIENT
Start: 2023-02-23 | End: 2023-02-23 | Stop reason: SDUPTHER

## 2023-02-23 RX ORDER — DIPHENHYDRAMINE HYDROCHLORIDE 50 MG/ML
12.5 INJECTION INTRAMUSCULAR; INTRAVENOUS
Status: DISCONTINUED | OUTPATIENT
Start: 2023-02-23 | End: 2023-02-23 | Stop reason: HOSPADM

## 2023-02-23 RX ORDER — FENTANYL CITRATE 50 UG/ML
INJECTION, SOLUTION INTRAMUSCULAR; INTRAVENOUS PRN
Status: DISCONTINUED | OUTPATIENT
Start: 2023-02-23 | End: 2023-02-23 | Stop reason: SDUPTHER

## 2023-02-23 RX ORDER — FENTANYL CITRATE 50 UG/ML
25 INJECTION, SOLUTION INTRAMUSCULAR; INTRAVENOUS EVERY 5 MIN PRN
Status: DISCONTINUED | OUTPATIENT
Start: 2023-02-23 | End: 2023-02-23 | Stop reason: HOSPADM

## 2023-02-23 RX ORDER — HYDROCODONE BITARTRATE AND ACETAMINOPHEN 5; 325 MG/1; MG/1
1 TABLET ORAL EVERY 4 HOURS PRN
Qty: 42 TABLET | Refills: 0 | Status: SHIPPED | OUTPATIENT
Start: 2023-02-23 | End: 2023-03-02

## 2023-02-23 RX ORDER — PROPOFOL 10 MG/ML
INJECTION, EMULSION INTRAVENOUS CONTINUOUS PRN
Status: DISCONTINUED | OUTPATIENT
Start: 2023-02-23 | End: 2023-02-23 | Stop reason: SDUPTHER

## 2023-02-23 RX ORDER — MIDAZOLAM HYDROCHLORIDE 1 MG/ML
INJECTION INTRAMUSCULAR; INTRAVENOUS PRN
Status: DISCONTINUED | OUTPATIENT
Start: 2023-02-23 | End: 2023-02-23 | Stop reason: SDUPTHER

## 2023-02-23 RX ORDER — BUPIVACAINE HYDROCHLORIDE 5 MG/ML
INJECTION, SOLUTION EPIDURAL; INTRACAUDAL PRN
Status: DISCONTINUED | OUTPATIENT
Start: 2023-02-23 | End: 2023-02-23 | Stop reason: ALTCHOICE

## 2023-02-23 RX ORDER — ONDANSETRON 2 MG/ML
4 INJECTION INTRAMUSCULAR; INTRAVENOUS
Status: DISCONTINUED | OUTPATIENT
Start: 2023-02-23 | End: 2023-02-23 | Stop reason: HOSPADM

## 2023-02-23 RX ORDER — WATER 1000 ML/1000ML
INJECTION, SOLUTION INTRAVENOUS PRN
Status: DISCONTINUED | OUTPATIENT
Start: 2023-02-23 | End: 2023-02-23 | Stop reason: SDUPTHER

## 2023-02-23 RX ORDER — DOXYCYCLINE HYCLATE 100 MG
100 TABLET ORAL 2 TIMES DAILY
Qty: 20 TABLET | Refills: 0 | Status: SHIPPED | OUTPATIENT
Start: 2023-02-23 | End: 2023-03-05

## 2023-02-23 RX ORDER — HYDRALAZINE HYDROCHLORIDE 20 MG/ML
10 INJECTION INTRAMUSCULAR; INTRAVENOUS
Status: DISCONTINUED | OUTPATIENT
Start: 2023-02-23 | End: 2023-02-23 | Stop reason: HOSPADM

## 2023-02-23 RX ORDER — HYDROCODONE BITARTRATE AND ACETAMINOPHEN 5; 325 MG/1; MG/1
1 TABLET ORAL ONCE
Status: COMPLETED | OUTPATIENT
Start: 2023-02-23 | End: 2023-02-23

## 2023-02-23 RX ADMIN — WATER 20 ML: 1 INJECTION INTRAMUSCULAR; INTRAVENOUS; SUBCUTANEOUS at 07:37

## 2023-02-23 RX ADMIN — CEFAZOLIN 2 G: 2 INJECTION, POWDER, FOR SOLUTION INTRAMUSCULAR; INTRAVENOUS at 07:37

## 2023-02-23 RX ADMIN — SODIUM CHLORIDE: 9 INJECTION, SOLUTION INTRAVENOUS at 06:26

## 2023-02-23 RX ADMIN — FENTANYL CITRATE 50 MCG: 50 INJECTION, SOLUTION INTRAMUSCULAR; INTRAVENOUS at 07:34

## 2023-02-23 RX ADMIN — MIDAZOLAM 2 MG: 1 INJECTION INTRAMUSCULAR; INTRAVENOUS at 07:27

## 2023-02-23 RX ADMIN — FENTANYL CITRATE 100 MCG: 50 INJECTION, SOLUTION INTRAMUSCULAR; INTRAVENOUS at 07:55

## 2023-02-23 RX ADMIN — FENTANYL CITRATE 50 MCG: 50 INJECTION, SOLUTION INTRAMUSCULAR; INTRAVENOUS at 07:29

## 2023-02-23 RX ADMIN — HYDROCODONE BITARTRATE AND ACETAMINOPHEN 1 TABLET: 5; 325 TABLET ORAL at 08:55

## 2023-02-23 RX ADMIN — PROPOFOL 150 MCG/KG/MIN: 10 INJECTION, EMULSION INTRAVENOUS at 07:29

## 2023-02-23 ASSESSMENT — PAIN DESCRIPTION - PAIN TYPE: TYPE: SURGICAL PAIN

## 2023-02-23 ASSESSMENT — PAIN SCALES - GENERAL
PAINLEVEL_OUTOF10: 6
PAINLEVEL_OUTOF10: 5

## 2023-02-23 ASSESSMENT — PAIN DESCRIPTION - ORIENTATION
ORIENTATION: LEFT
ORIENTATION: LEFT

## 2023-02-23 ASSESSMENT — PAIN DESCRIPTION - DESCRIPTORS
DESCRIPTORS: ACHING
DESCRIPTORS: ACHING

## 2023-02-23 ASSESSMENT — PAIN - FUNCTIONAL ASSESSMENT: PAIN_FUNCTIONAL_ASSESSMENT: PREVENTS OR INTERFERES SOME ACTIVE ACTIVITIES AND ADLS

## 2023-02-23 ASSESSMENT — PAIN DESCRIPTION - ONSET: ONSET: ON-GOING

## 2023-02-23 ASSESSMENT — PAIN DESCRIPTION - LOCATION
LOCATION: FOOT
LOCATION: FOOT

## 2023-02-23 ASSESSMENT — LIFESTYLE VARIABLES: SMOKING_STATUS: 1

## 2023-02-23 NOTE — BRIEF OP NOTE
Brief Postoperative Note      Patient: Genie Andujar  YOB: 1965  MRN: [id number]    Date of Procedure: 2/23/2023    Pre-Op Diagnosis: Osteomyelitis of ankle or foot, left, acute ([facility] [M86.172]    Post-Op Diagnosis: Same       Procedure(s):  LEFT 3RD TOE  AMPUTATION    Surgeon(s):  Candido Weston Callow, DPM    Assistant:  Resident: Vicki Hein DPM    Anesthesia: Monitor Anesthesia Care    Estimated Blood Loss (mL): Minimal    Complications: None    Specimens:   ID Type Source Tests Collected by Time Destination   A : LEFT THIRD TOE Tissue Tissue SURGICAL PATHOLOGY Candido Curiel DPM 2/23/2023 0730        Implants:  * No implants in log *      Drains: * No LDAs found *    Findings: necrosis of the left third toe    Electronically signed by Jb Mcfarlane DPM on 2/23/2023 at 8:02 AM

## 2023-02-23 NOTE — PROGRESS NOTES
CLINICAL PHARMACY NOTE: MEDS TO BEDS    Total # of Prescriptions Filled: 2   The following medications were delivered to the patient:  Norco 5-325 mg  Doxycycline Hyc 100 mg    Additional Documentation:

## 2023-02-23 NOTE — H&P
H and P reviewed. No changes. Plan for left third toe amputation. There were no vitals taken for this visit.      Bel Collier DPM FACFAS  Fellowship-Trained Foot and Ankle Surgeon  Diplomate, [employer] Board of Foot and Ankle Surgeons  [phone]

## 2023-02-23 NOTE — ANESTHESIA PRE PROCEDURE
Department of Anesthesiology  Preprocedure Note       Name:  Deepti Barillas   Age:  60 y.o.  :  1965                                          MRN:  [id number]         Date:  2023      Surgeon: Debbie Barragan):  Candido Londono DPM    Procedure: Procedure(s):  LEFT 3RD TOE  AMPUTATION    Medications prior to admission:   Prior to Admission medications    Medication Sig Start Date End Date Taking?  Authorizing Provider   Raina Martinez 160-9-4.8 MCG/ACT AERO INHALE 2 PUFFS BY MOUTH TWICE DAILY 23   Historical Provider, MD   DULoxetine (CYMBALTA) 60 MG extended release capsule TAKE 1 CAPSULE BY MOUTH EVERY DAY 22   Historical Provider, MD   LEVEMIR FLEXTOUCH 100 UNIT/ML injection pen INJECT 56 UNITS UNDER THE SKIN EVERY MORNING AND 46 UNITS EVERY NIGHT AT BEDTIME 2/10/23   Historical Provider, MD   furosemide (LASIX) 20 MG tablet TAKE 1 TABLET BY MOUTH DAILY 22   Mone Russell MD   metoprolol succinate (TOPROL XL) 25 MG extended release tablet TAKE 1 TABLET BY MOUTH DAILY 22   Mone Russell MD   ENTRESTO 24-26 MG per tablet TAKE HALF A TABLET BY MOUTH TWICE DAILY 22   Mone Russell MD   busPIRone (BUSPAR) 5 MG tablet Take 5 mg by mouth 2 times daily    Historical Provider, MD   BIOTIN PO Take by mouth daily    Historical Provider, MD   Cyanocobalamin (VITAMIN B 12 PO) Take by mouth daily    Historical Provider, MD   TURMERIC PO Take by mouth daily    Historical Provider, MD   vitamin C (ASCORBIC ACID) 500 MG tablet Take 1,000 mg by mouth daily    Historical Provider, MD   CINNAMON PO Take by mouth daily    Historical Provider, MD   Cholecalciferol (VITAMIN D) 125 MCG (5000 UT) CAPS Take by mouth daily    Historical Provider, MD   aspirin 81 MG EC tablet Take by mouth Last dose  4/10    Historical Provider, MD   atorvastatin (LIPITOR) 40 MG tablet Take by mouth nightly     Historical Provider, MD   clopidogrel (PLAVIX) 75 MG tablet Take 75 mg by mouth daily Last dose 4/15    Historical Provider, MD   escitalopram (LEXAPRO) 20 MG tablet Take by mouth nightly Last dose 4/13  Patient not taking: Reported on 2/14/2023    Historical Provider, MD   ferrous sulfate (IRON 325) 325 (65 Fe) MG tablet Take by mouth daily (with breakfast)   Patient not taking: Reported on 2/14/2023    Historical Provider, MD   fluticasone-vilanterol (BREO ELLIPTA) 100-25 MCG/INH AEPB inhaler Inhale into the lungs daily     Historical Provider, MD   fluticasone (FLONASE) 50 MCG/ACT nasal spray SHAKE LIQUID AND USE 1 SPRAY IN EACH NOSTRIL EVERY DAY 6/8/21   Historical Provider, MD   gabapentin (NEURONTIN) 300 MG capsule Take by mouth nightly. 3 tablets once hs    Historical Provider, MD   metFORMIN (GLUCOPHAGE) 1000 MG tablet Take 1,000 mg by mouth 2 times daily (with meals)     Historical Provider, MD   pantoprazole (PROTONIX) 40 MG tablet Take by mouth daily     Historical Provider, MD   traZODone (DESYREL) 50 MG tablet Take 50 mg by mouth nightly Last dose 4/13    Historical Provider, MD   albuterol sulfate  (90 BASE) MCG/ACT inhaler Inhale 2 puffs into the lungs every 6 hours as needed for Wheezing    Historical Provider, MD       Current medications:    Current Facility-Administered Medications   Medication Dose Route Frequency Provider Last Rate Last Admin    0.9 % sodium chloride infusion   IntraVENous Continuous Hafsa Harvey MD 50 mL/hr at 02/23/23 0626 New Bag at 02/23/23 0626    ceFAZolin (ANCEF) 2,000 mg in sterile water 20 mL IV syringe  2,000 mg IntraVENous Once Candido Curiel DPM           Allergies:     Allergies   Allergen Reactions    Ace Inhibitors Other (See Comments)     cough    Percocet [Oxycodone-Acetaminophen] Other (See Comments)     Hallucinates very badly       Problem List:    Patient Active Problem List   Diagnosis Code    Diabetic ulcer of toe of right foot associated with type 2 diabetes mellitus [facility] E11.621, L97.519    Perforated bowel ([facility] K63.1    CAD (coronary artery disease) I25.10   • NSTEMI (non-ST elevated myocardial infarction) ([facility]) I21.4   • Infected surgical wound T81.49XA   • Cerebrovascular accident (CVA) due to stenosis of left middle cerebral artery ([facility]) I63.512   • Adhesive capsulitis of right shoulder M75.01   • Shoulder impingement M75.40   • Primary osteoarthritis of right knee M17.11   • Cellulitis of foot L03.119   • Acute respiratory failure ([facility]) J96.00   • Acute on chronic respiratory failure with hypoxia ([facility]) J96.21   • Chronic HFrEF (heart failure with reduced ejection fraction) ([facility]) I50.22   • Ischemic cardiomyopathy I25.5   • CARYL (acute kidney injury) ([facility]) N17.9   • Pneumonia J18.9       Past Medical History:        Diagnosis Date   • Anxiety    • CAD (coronary artery disease)     x5 stents   • Cardiomyopathy ([facility])    • Cerebral artery occlusion with cerebral infarction ([facility])     rt sided weakness  cane   • COPD (chronic obstructive pulmonary disease) ([facility])    • Depression    • Diabetes mellitus ([facility])     IDDM   • Hx of cardiovascular stress test 03/22/2016    Nuclear Lexiscan Stress Test  3/22/2016  6/11/2021   • Hyperlipidemia    • Nonrheumatic mitral (valve) insufficiency    • Nonrheumatic tricuspid (valve) insufficiency    • Sleep apnea     cpap 7.5   • Smoker     current 1PPD X35yrs       Past Surgical History:        Procedure Laterality Date   • ABDOMEN SURGERY     • ABDOMINAL EXPLORATION SURGERY N/A 6-5-16    Excision perforated mid body gastric ulcer, primary closure omentum patch   • BACK SURGERY      cages rods lower back   • CARDIAC CATHETERIZATION  05/03/2018    Dr. Trinidad   • CHOLECYSTECTOMY     • CORONARY ANGIOPLASTY WITH STENT PLACEMENT     • CORONARY ARTERY BYPASS GRAFT      2018 triple bypass   • DIAGNOSTIC CARDIAC CATH LAB PROCEDURE  05/03/2018    Dr. Trinidad multiple PCIs    • HYSTERECTOMY (CERVIX STATUS UNKNOWN)     • KNEE ARTHROPLASTY     • TOE AMPUTATION Right 1/23/2021    RIGHT SECOND DIGIT AMPUTATION performed by Candido ORDONEZ  BLAYNE Curiel at [facility] TRANSESOPHAGEAL ECHOCARDIOGRAM  10/28/2016    Dr. Michele Paredes r/o embolic source       Social History:    Social History     Tobacco Use    Smoking status: Every Day     Packs/day: 1.00     Years: 43.00     Pack years: 43.00     Types: Cigarettes    Smokeless tobacco: Never   Substance Use Topics    Alcohol use: No     Comment: 1 cups of coffee a day                                Ready to quit: Not Answered  Counseling given: Not Answered      Vital Signs (Current): There were no vitals filed for this visit.                                            BP Readings from Last 3 Encounters:   02/22/23 (!) 141/75   02/14/23 130/68   05/17/22 126/72       NPO Status: Time of last liquid consumption: 2000                        Time of last solid consumption: 1630                        Date of last liquid consumption: 02/22/23                        Date of last solid food consumption: 02/22/23    BMI:   Wt Readings from Last 3 Encounters:   02/22/23 245 lb (111.1 kg)   02/14/23 249 lb (112.9 kg)   05/17/22 259 lb 9.6 oz (117.8 kg)     There is no height or weight on file to calculate BMI.    CBC:   Lab Results   Component Value Date/Time    WBC 8.7 02/22/2023 08:35 AM    RBC 4.79 02/22/2023 08:35 AM    HGB 13.7 02/22/2023 08:35 AM    HCT 42.7 02/22/2023 08:35 AM    MCV 89.1 02/22/2023 08:35 AM    RDW 14.5 02/22/2023 08:35 AM     02/22/2023 08:35 AM       CMP:   Lab Results   Component Value Date/Time     02/23/2023 06:20 AM    K 5.0 02/23/2023 06:20 AM     02/23/2023 06:20 AM    CO2 29 02/23/2023 06:20 AM    BUN 19 02/23/2023 06:20 AM    CREATININE 1.2 02/23/2023 06:20 AM    GFRAA 47 07/20/2021 04:38 AM    LABGLOM 52 02/23/2023 06:20 AM    GLUCOSE 188 02/23/2023 06:20 AM    GLUCOSE 179 06/17/2011 09:49 AM    PROT 6.9 07/20/2021 04:38 AM    CALCIUM 9.1 02/23/2023 06:20 AM    BILITOT 0.3 07/20/2021 04:38 AM    ALKPHOS 95 07/20/2021 04:38 AM    AST 18 07/20/2021 04:38 AM    ALT 23 07/20/2021 04:38 AM       POC Tests: No results for input(s): POCGLU, POCNA, POCK, POCCL, POCBUN, POCHEMO, POCHCT in the last 72 hours. Coags:   Lab Results   Component Value Date/Time    PROTIME 11.8 04/18/2022 07:34 AM    INR 1.0 04/18/2022 07:34 AM    APTT 27.8 04/30/2018 12:05 PM       HCG (If Applicable): No results found for: PREGTESTUR, PREGSERUM, HCG, HCGQUANT     ABGs: No results found for: PHART, PO2ART, EMH0DUZ, UWQ3DNN, BEART, R9ULPANB     Type & Screen (If Applicable):  No results found for: LABABO, LABRH    Drug/Infectious Status (If Applicable):  No results found for: HIV, HEPCAB    COVID-19 Screening (If Applicable):   Lab Results   Component Value Date/Time    COVID19 Not Detected 07/18/2021 12:30 AM         Anesthesia Evaluation  Patient summary reviewed no history of anesthetic complications:   Airway: Mallampati: II  TM distance: >3 FB   Neck ROM: full  Mouth opening: > = 3 FB   Dental:    (+) upper dentures and lower dentures      Pulmonary: breath sounds clear to auscultation  (+) COPD:  sleep apnea: on CPAP,  current smoker (1 PPD. 43 pack year history)                           Cardiovascular:    (+) hypertension:, valvular problems/murmurs (Non Rheumatic MI, TI.): MR, past MI:, CAD:, CABG/stent (Stent.):, hyperlipidemia      ECG reviewed  Rhythm: regular  Rate: normal  Echocardiogram reviewed         Beta Blocker:  Dose within 24 Hrs      ROS comment: Cardiomyopathy. EKG: Sinus Rhythm 80, Richard Inferior MI, with premature ventricular contractions. CATH:  1. Totally occluded OM stent with a totally occluded left circumflex artery stent with left-to-right collaterals. 2.  Moderate to severe disease involving the distal left main. 3.  Mild disease involving the right coronary artery. ECHO:   Borderline concentric left ventricular hypertrophy. Moderately dilated left ventricle. Ejection fraction is visually estimated at 32%.    Overall ejection fraction severely decreased .   E/A flow reversal noted. Suggestive of diastolic dysfunction. The left atrium is mildly dilated. Physiologic and/or trace mitral regurgitation is present. Physiologic and/or trace pulmonic regurgitation present. PCP is Dr. Narcisa Stevens and ordering doctor is Alex Black at fax   [phone]. Neuro/Psych:   (+) CVA (right sided weakness): residual symptoms, psychiatric history:depression/anxiety             GI/Hepatic/Renal:        (-) no morbid obesity       Endo/Other:    (+) DiabetesType II DM, , blood dyscrasia: anticoagulation therapy:., .                 Abdominal:   (+) obese,           Vascular: negative vascular ROS. Other Findings:             Anesthesia Plan      MAC     ASA 3       Induction: intravenous. MIPS: Postoperative opioids intended and Prophylactic antiemetics administered. Anesthetic plan and risks discussed with patient. Plan discussed with CRNA. DOS STAFF ADDENDUM:    Pt seen and examined, chart reviewed (including anesthesia, drug and allergy history). Anesthetic plan, risks, benefits, alternatives, and personnel involved discussed with patient. Patient verbalized an understanding and agrees to proceed. Plan discussed with care team members and agreed upon.     DAVID Fox - TRUONG  Staff Anesthesiologist  7:11 AM      DAVID Fox CRNA   2/23/2023

## 2023-02-23 NOTE — DISCHARGE INSTRUCTIONS
[facility]  __________________________________________________    Discharge Instructions  [address]    Patient Name:  Dixon Chaves      Date: 2/23/2023  Time:  9:00 AM      _x__ Rest today; all patients that received a general anesthetic should not drive for 24 hours. _x__ Keep dressing clean and dry. Suture removal will be arranged by your physician. _x__ Drink extra fluids (non-alcoholic) for the next 24 hours. _x__ Refer to your physician's special handout. See below. ___ Make an appointment to see your physician on     __x_ Call office tomorrow to make follow-up arrangements. __x_ You need to have a responsible adult stay with you for the next 24 hours after surgery. __x_ Medications as follows:Hydrocodone script given to pt. Administered Hydrocodone 5mg to pt. At 0900.    ___ Refer to the medication handout.    ___ [facility] / Shower Instructions    __x_ Special Instructions specific to your care as follows: Ok to [employer] today if ok with anesthesia  Follow up in office 1week(s)  call office for appointment  Do not touch dressing / splint  Partial WB to left heel at 50% using surgical shoe and walker. Elevate left foot. Call if any concerns  Candido Cuirel DPM DPM        COMMENTS: Your recovery is expected to be uneventful; however you may or may not experience mild to moderate discomfort after being discharged from the hospital. As instructed by your physician, if you begin to experience increasing pain, excessive swelling, bleeding from the area of you operation, saturation of the dressing, or any other unusual event as outlined by your physician, contact your doctor. If you are not able to reach your physician, the hospital Emergency Room is available 24 hours a day. I HAVE READ AND UNDERSTAND THE ABOVE INSTRUCTIONS. EMERGENCY DEPT.   SIGNATURE ___________________ RN/PHYSICIAN [address] ___________________ PATIENT  [phone]    SIGNATURE ___________________ RESPONSIBLE ADULT

## 2023-02-23 NOTE — ANESTHESIA POSTPROCEDURE EVALUATION
Department of Anesthesiology  Postprocedure Note    Patient: Rivas Strauss  MRN: [id number]  YOB: 1965  Date of evaluation: 2/23/2023      Procedure Summary     Date: 02/23/23 Room / Location: [address] [place] [address] / [facility] AT [facility]    Anesthesia Start: 2141 Anesthesia Stop: 0801    Procedure: LEFT 3RD TOE  AMPUTATION (Left: Toes) Diagnosis:       Osteomyelitis of ankle or foot, left, acute ([facility])      (O[facility]omyel[facility] of ankle or foot, left, [facility] [G10.924])    Surgeons: Homero Espinoza DPM Responsible Provider: Bela Yousif MD    Anesthesia Type: MAC ASA Status: 3          Anesthesia Type: No value filed.     Lizzie Phase I: Lizzie Score: 10    Lizzie Phase II:        Anesthesia Post Evaluation    Patient location during evaluation: bedside  Patient participation: complete - patient participated  Level of consciousness: awake  Airway patency: patent  Nausea & Vomiting: no nausea and no vomiting  Complications: no  Cardiovascular status: hemodynamically stable and blood pressure returned to baseline  Respiratory status: acceptable  Hydration status: euvolemic

## 2023-02-24 NOTE — OP NOTE
[address]                                OPERATIVE REPORT    PATIENT NAME: Cody Herron                     :        1965  MED REC NO:   [id number]                            ROOM:  ACCOUNT NO:   [de-identified]                           ADMIT DATE: 2023  PROVIDER:     Azeem Gupta DPM    DATE OF PROCEDURE:  2023    SURGEON:  Azeem Gupta DPM    ASSISTANT:  Dwaine Stauffer DPM, PGY-2    PREOPERATIVE DIAGNOSIS:  Acute osteomyelitis, left third toe. POSTOPERATIVE DIAGNOSIS:  Acute osteomyelitis, left third toe. PROCEDURE:  Amputation of left third toe to the level of  metatarsophalangeal joint. ANESTHESIA:  Monitored anesthesia care. INJECTABLES:  20 mL of 0.5% Marcaine plain. COMPLICATIONS:  None. SPECIMEN OBTAINED:  Left third toe. MATERIALS USED:  3-0 nylon suture. INTRAOPERATIVE FINDINGS:  Necrosis of left third toe. INDICATIONS:  A pleasant 69-year-old female presented today for left  third toe amputation. I counseled the patient on the nature of the  problem, proposed course of the procedure, potential benefits, risks,  complications, and convalescence in detail. All questions were answered  to the patient's satisfaction. No guarantees were given as to the  outcome of the procedure. DESCRIPTION OF PROCEDURE:  Under mild sedation, the patient was brought  to the operating room and was placed on the operating table in the  supine position. The left lower extremity was scrubbed, prepped, and  draped in the usual sterile fashion. At this time, using #10 blade, I  performed a linear incision in the dorsal aspect of left third toe. The  incision was taken through the subcutaneous tissue. Bleeders were  ligated as appropriate.   Next, at this point, _____ I was able then to  disarticulate the toe from soft tissue attachment from the  metatarsophalangeal joint, was able to disarticulate successfully, sent  off for pathological and microbiological examination. At this point, I  irrigated the area using copious amounts of normal saline. Post  irrigation, I closed that incision using 3-0 nylon suture. Excellent  closure was noted at this time. Postoperative bandage was then applied. The patient overall tolerated the procedure and anesthesia well and in  apparent satisfactory condition with vital signs stable and vascular  status intact to the left lower extremity. The patient was transferred  to PACU for further monitoring prior to readmission to the nursing  floor.         Owen Rowland DPM    D: 02/23/2023 8:52:16       T: 02/23/2023 19:09:50     [email or web address]  Job#: [id number]     Doc#: [id number]    CC:

## 2023-08-18 RX ORDER — METOPROLOL SUCCINATE 25 MG/1
25 TABLET, EXTENDED RELEASE ORAL DAILY
Qty: 90 TABLET | Refills: 3 | Status: SHIPPED | OUTPATIENT
Start: 2023-08-18

## 2023-10-23 ENCOUNTER — HOSPITAL ENCOUNTER (INPATIENT)
Age: 58
LOS: 6 days | Discharge: HOME | DRG: 871 | End: 2023-10-29
Attending: STUDENT IN AN ORGANIZED HEALTH CARE EDUCATION/TRAINING PROGRAM | Admitting: INTERNAL MEDICINE
Payer: MEDICARE

## 2023-10-23 ENCOUNTER — APPOINTMENT (OUTPATIENT)
Dept: CT IMAGING | Age: 58
DRG: 871 | End: 2023-10-23
Payer: MEDICARE

## 2023-10-23 ENCOUNTER — APPOINTMENT (OUTPATIENT)
Dept: GENERAL RADIOLOGY | Age: 58
DRG: 871 | End: 2023-10-23
Payer: MEDICARE

## 2023-10-23 ENCOUNTER — APPOINTMENT (OUTPATIENT)
Dept: ULTRASOUND IMAGING | Age: 58
DRG: 871 | End: 2023-10-23
Payer: MEDICARE

## 2023-10-23 DIAGNOSIS — I50.43 ACUTE ON CHRONIC COMBINED SYSTOLIC AND DIASTOLIC CHF (CONGESTIVE HEART FAILURE) (HCC): ICD-10-CM

## 2023-10-23 DIAGNOSIS — L03.119 CELLULITIS OF LOWER EXTREMITY, UNSPECIFIED LATERALITY: ICD-10-CM

## 2023-10-23 DIAGNOSIS — N17.9 ACUTE RENAL FAILURE, UNSPECIFIED ACUTE RENAL FAILURE TYPE (HCC): Primary | ICD-10-CM

## 2023-10-23 DIAGNOSIS — E87.70 HYPERVOLEMIA, UNSPECIFIED HYPERVOLEMIA TYPE: ICD-10-CM

## 2023-10-23 DIAGNOSIS — Z59.48 LACK OF ADEQUATE FOOD: ICD-10-CM

## 2023-10-23 DIAGNOSIS — I50.22 CHRONIC HFREF (HEART FAILURE WITH REDUCED EJECTION FRACTION) (HCC): ICD-10-CM

## 2023-10-23 LAB
ALBUMIN SERPL-MCNC: 3.6 G/DL (ref 3.5–5.2)
ALP SERPL-CCNC: 102 U/L (ref 35–104)
ALT SERPL-CCNC: 10 U/L (ref 0–32)
ANION GAP SERPL CALCULATED.3IONS-SCNC: 13 MMOL/L (ref 7–16)
AST SERPL-CCNC: 16 U/L (ref 0–31)
BASOPHILS # BLD: 0.04 K/UL (ref 0–0.2)
BASOPHILS NFR BLD: 1 % (ref 0–2)
BILIRUB SERPL-MCNC: 0.3 MG/DL (ref 0–1.2)
BNP SERPL-MCNC: 7241 PG/ML (ref 0–450)
BUN SERPL-MCNC: 47 MG/DL (ref 6–20)
CALCIUM SERPL-MCNC: 9.2 MG/DL (ref 8.6–10.2)
CHLORIDE SERPL-SCNC: 98 MMOL/L (ref 98–107)
CO2 SERPL-SCNC: 24 MMOL/L (ref 22–29)
CREAT SERPL-MCNC: 2.1 MG/DL (ref 0.5–1)
D DIMER: 563 NG/ML DDU (ref 0–232)
EOSINOPHIL # BLD: 0.07 K/UL (ref 0.05–0.5)
EOSINOPHILS RELATIVE PERCENT: 1 % (ref 0–6)
ERYTHROCYTE [DISTWIDTH] IN BLOOD BY AUTOMATED COUNT: 15 % (ref 11.5–15)
GFR SERPL CREATININE-BSD FRML MDRD: 27 ML/MIN/1.73M2
GLUCOSE SERPL-MCNC: 119 MG/DL (ref 74–99)
HCT VFR BLD AUTO: 38.2 % (ref 34–48)
HGB BLD-MCNC: 12.3 G/DL (ref 11.5–15.5)
IMM GRANULOCYTES # BLD AUTO: 0.03 K/UL (ref 0–0.58)
IMM GRANULOCYTES NFR BLD: 0 % (ref 0–5)
LACTATE BLDV-SCNC: 2.1 MMOL/L (ref 0.5–2.2)
LYMPHOCYTES NFR BLD: 1.45 K/UL (ref 1.5–4)
LYMPHOCYTES RELATIVE PERCENT: 21 % (ref 20–42)
MCH RBC QN AUTO: 27.6 PG (ref 26–35)
MCHC RBC AUTO-ENTMCNC: 32.2 G/DL (ref 32–34.5)
MCV RBC AUTO: 85.8 FL (ref 80–99.9)
MONOCYTES NFR BLD: 0.68 K/UL (ref 0.1–0.95)
MONOCYTES NFR BLD: 10 % (ref 2–12)
NEUTROPHILS NFR BLD: 68 % (ref 43–80)
NEUTS SEG NFR BLD: 4.75 K/UL (ref 1.8–7.3)
PLATELET, FLUORESCENCE: 478 K/UL (ref 130–450)
PMV BLD AUTO: 8.9 FL (ref 7–12)
POTASSIUM SERPL-SCNC: 4.4 MMOL/L (ref 3.5–5)
PROT SERPL-MCNC: 7.2 G/DL (ref 6.4–8.3)
RBC # BLD AUTO: 4.45 M/UL (ref 3.5–5.5)
SODIUM SERPL-SCNC: 135 MMOL/L (ref 132–146)
TROPONIN I SERPL HS-MCNC: 62 NG/L (ref 0–9)
TROPONIN I SERPL HS-MCNC: 63 NG/L (ref 0–9)
WBC OTHER # BLD: 7 K/UL (ref 4.5–11.5)

## 2023-10-23 PROCEDURE — 84484 ASSAY OF TROPONIN QUANT: CPT

## 2023-10-23 PROCEDURE — 71275 CT ANGIOGRAPHY CHEST: CPT

## 2023-10-23 PROCEDURE — 93970 EXTREMITY STUDY: CPT

## 2023-10-23 PROCEDURE — 83880 ASSAY OF NATRIURETIC PEPTIDE: CPT

## 2023-10-23 PROCEDURE — 71045 X-RAY EXAM CHEST 1 VIEW: CPT

## 2023-10-23 PROCEDURE — 2580000003 HC RX 258: Performed by: STUDENT IN AN ORGANIZED HEALTH CARE EDUCATION/TRAINING PROGRAM

## 2023-10-23 PROCEDURE — 85025 COMPLETE CBC W/AUTO DIFF WBC: CPT

## 2023-10-23 PROCEDURE — 80053 COMPREHEN METABOLIC PANEL: CPT

## 2023-10-23 PROCEDURE — 2060000000 HC ICU INTERMEDIATE R&B

## 2023-10-23 PROCEDURE — 87040 BLOOD CULTURE FOR BACTERIA: CPT

## 2023-10-23 PROCEDURE — 85379 FIBRIN DEGRADATION QUANT: CPT

## 2023-10-23 PROCEDURE — 93005 ELECTROCARDIOGRAM TRACING: CPT | Performed by: STUDENT IN AN ORGANIZED HEALTH CARE EDUCATION/TRAINING PROGRAM

## 2023-10-23 PROCEDURE — 6360000002 HC RX W HCPCS: Performed by: STUDENT IN AN ORGANIZED HEALTH CARE EDUCATION/TRAINING PROGRAM

## 2023-10-23 PROCEDURE — 99285 EMERGENCY DEPT VISIT HI MDM: CPT

## 2023-10-23 PROCEDURE — 83605 ASSAY OF LACTIC ACID: CPT

## 2023-10-23 PROCEDURE — 6360000004 HC RX CONTRAST MEDICATION: Performed by: RADIOLOGY

## 2023-10-23 RX ORDER — GABAPENTIN 300 MG/1
300 CAPSULE ORAL NIGHTLY
Status: DISCONTINUED | OUTPATIENT
Start: 2023-10-23 | End: 2023-10-29 | Stop reason: HOSPADM

## 2023-10-23 RX ORDER — POLYETHYLENE GLYCOL 3350 17 G/17G
17 POWDER, FOR SOLUTION ORAL DAILY PRN
Status: DISCONTINUED | OUTPATIENT
Start: 2023-10-23 | End: 2023-10-29 | Stop reason: HOSPADM

## 2023-10-23 RX ORDER — ACETAMINOPHEN 325 MG/1
650 TABLET ORAL EVERY 6 HOURS PRN
Status: DISCONTINUED | OUTPATIENT
Start: 2023-10-23 | End: 2023-10-29 | Stop reason: HOSPADM

## 2023-10-23 RX ORDER — BUDESONIDE 0.25 MG/2ML
0.25 INHALANT ORAL
Status: DISCONTINUED | OUTPATIENT
Start: 2023-10-24 | End: 2023-10-29 | Stop reason: HOSPADM

## 2023-10-23 RX ORDER — METOPROLOL SUCCINATE 25 MG/1
25 TABLET, EXTENDED RELEASE ORAL DAILY
Status: DISCONTINUED | OUTPATIENT
Start: 2023-10-24 | End: 2023-10-29 | Stop reason: HOSPADM

## 2023-10-23 RX ORDER — GLUCAGON 1 MG/ML
1 KIT INJECTION PRN
Status: DISCONTINUED | OUTPATIENT
Start: 2023-10-23 | End: 2023-10-29 | Stop reason: HOSPADM

## 2023-10-23 RX ORDER — DULOXETIN HYDROCHLORIDE 60 MG/1
60 CAPSULE, DELAYED RELEASE ORAL DAILY
Status: DISCONTINUED | OUTPATIENT
Start: 2023-10-24 | End: 2023-10-29 | Stop reason: HOSPADM

## 2023-10-23 RX ORDER — DEXTROSE MONOHYDRATE 100 MG/ML
INJECTION, SOLUTION INTRAVENOUS CONTINUOUS PRN
Status: DISCONTINUED | OUTPATIENT
Start: 2023-10-23 | End: 2023-10-24

## 2023-10-23 RX ORDER — FUROSEMIDE 10 MG/ML
40 INJECTION INTRAMUSCULAR; INTRAVENOUS ONCE
Status: COMPLETED | OUTPATIENT
Start: 2023-10-23 | End: 2023-10-23

## 2023-10-23 RX ORDER — ARFORMOTEROL TARTRATE 15 UG/2ML
15 SOLUTION RESPIRATORY (INHALATION)
Status: DISCONTINUED | OUTPATIENT
Start: 2023-10-24 | End: 2023-10-29 | Stop reason: HOSPADM

## 2023-10-23 RX ORDER — SODIUM CHLORIDE 0.9 % (FLUSH) 0.9 %
5-40 SYRINGE (ML) INJECTION PRN
Status: DISCONTINUED | OUTPATIENT
Start: 2023-10-23 | End: 2023-10-29 | Stop reason: HOSPADM

## 2023-10-23 RX ORDER — FLUTICASONE PROPIONATE 50 MCG
1 SPRAY, SUSPENSION (ML) NASAL DAILY
Status: DISCONTINUED | OUTPATIENT
Start: 2023-10-24 | End: 2023-10-29 | Stop reason: HOSPADM

## 2023-10-23 RX ORDER — ACETAMINOPHEN 650 MG/1
650 SUPPOSITORY RECTAL EVERY 6 HOURS PRN
Status: DISCONTINUED | OUTPATIENT
Start: 2023-10-23 | End: 2023-10-29 | Stop reason: HOSPADM

## 2023-10-23 RX ORDER — SODIUM CHLORIDE 0.9 % (FLUSH) 0.9 %
5-40 SYRINGE (ML) INJECTION EVERY 12 HOURS SCHEDULED
Status: DISCONTINUED | OUTPATIENT
Start: 2023-10-23 | End: 2023-10-29 | Stop reason: HOSPADM

## 2023-10-23 RX ORDER — PANTOPRAZOLE SODIUM 40 MG/1
40 TABLET, DELAYED RELEASE ORAL DAILY
Status: DISCONTINUED | OUTPATIENT
Start: 2023-10-24 | End: 2023-10-29 | Stop reason: HOSPADM

## 2023-10-23 RX ORDER — TRAZODONE HYDROCHLORIDE 50 MG/1
50 TABLET ORAL NIGHTLY
Status: DISCONTINUED | OUTPATIENT
Start: 2023-10-23 | End: 2023-10-29 | Stop reason: HOSPADM

## 2023-10-23 RX ORDER — CLOPIDOGREL BISULFATE 75 MG/1
75 TABLET ORAL DAILY
Status: DISCONTINUED | OUTPATIENT
Start: 2023-10-24 | End: 2023-10-29 | Stop reason: HOSPADM

## 2023-10-23 RX ORDER — ENOXAPARIN SODIUM 100 MG/ML
30 INJECTION SUBCUTANEOUS 2 TIMES DAILY
Status: DISCONTINUED | OUTPATIENT
Start: 2023-10-23 | End: 2023-10-25

## 2023-10-23 RX ORDER — BUSPIRONE HYDROCHLORIDE 5 MG/1
5 TABLET ORAL 2 TIMES DAILY
Status: DISCONTINUED | OUTPATIENT
Start: 2023-10-23 | End: 2023-10-29 | Stop reason: HOSPADM

## 2023-10-23 RX ORDER — ASPIRIN 81 MG/1
81 TABLET ORAL DAILY
Status: DISCONTINUED | OUTPATIENT
Start: 2023-10-24 | End: 2023-10-25

## 2023-10-23 RX ORDER — SODIUM CHLORIDE 9 MG/ML
INJECTION, SOLUTION INTRAVENOUS PRN
Status: DISCONTINUED | OUTPATIENT
Start: 2023-10-23 | End: 2023-10-29 | Stop reason: HOSPADM

## 2023-10-23 RX ADMIN — CEFAZOLIN 2000 MG: 2 INJECTION, POWDER, FOR SOLUTION INTRAMUSCULAR; INTRAVENOUS at 21:45

## 2023-10-23 RX ADMIN — FUROSEMIDE 40 MG: 10 INJECTION, SOLUTION INTRAMUSCULAR; INTRAVENOUS at 21:43

## 2023-10-23 RX ADMIN — IOPAMIDOL 75 ML: 755 INJECTION, SOLUTION INTRAVENOUS at 20:40

## 2023-10-23 RX ADMIN — VANCOMYCIN HYDROCHLORIDE 1500 MG: 10 INJECTION, POWDER, LYOPHILIZED, FOR SOLUTION INTRAVENOUS at 23:08

## 2023-10-23 SDOH — ECONOMIC STABILITY - FOOD INSECURITY: OTHER SPECIFIED LACK OF ADEQUATE FOOD: Z59.48

## 2023-10-23 ASSESSMENT — PAIN DESCRIPTION - ORIENTATION: ORIENTATION: RIGHT;LEFT

## 2023-10-23 ASSESSMENT — LIFESTYLE VARIABLES: HOW MANY STANDARD DRINKS CONTAINING ALCOHOL DO YOU HAVE ON A TYPICAL DAY: PATIENT DOES NOT DRINK

## 2023-10-23 ASSESSMENT — PAIN - FUNCTIONAL ASSESSMENT: PAIN_FUNCTIONAL_ASSESSMENT: NONE - DENIES PAIN

## 2023-10-23 NOTE — ED NOTES
Pt. C/o bilateral leg swelling and redness for the past 2 weeks.      Carlie Edward RN  10/23/23 6224

## 2023-10-23 NOTE — ED PROVIDER NOTES
atraumatic  Eyes:  EOMI, conjunctiva normal, sclera non icteric  ENT:  Oropharynx clear, handling secretions, no trismus, no asymmetry of the posterior oropharynx or uvular edema  Neck: Supple, full ROM, no stridor, no meningeal signs  Respiratory: Lungs diminished to auscultation bilaterally, no wheezes, rales, or rhonchi. Not in respiratory distress  Cardiovascular:  Regular rate. Regular rhythm. No murmurs, no gallops, no rubs. Chest: No chest wall tenderness  GI:  Abdomen Soft, Non tender, Non distended. +BS. No rebound, guarding, or rigidity. No pulsatile masses. Musculoskeletal: Moves all extremities x 4. Warm and well perfused, no clubbing, no cyanosis, +3 pitting edema to the bilateral lower extremities extending up to the abdomen, the lower legs are erythematous and warm to touch. Capillary refill <3 seconds  Integument: skin warm and dry. No rashes.    Neurologic: GCS 15, no focal deficits, symmetric strength 5/5 in the upper and lower extremities bilaterally  Psychiatric: Normal Affect      DIAGNOSTIC RESULTS   LABS:    Labs Reviewed   CBC WITH AUTO DIFFERENTIAL - Abnormal; Notable for the following components:       Result Value    Platelet, Fluorescence 478 (*)     Lymphocytes Absolute 1.45 (*)     All other components within normal limits   COMPREHENSIVE METABOLIC PANEL W/ REFLEX TO MG FOR LOW K - Abnormal; Notable for the following components:    Glucose 119 (*)     BUN 47 (*)     Creatinine 2.1 (*)     Est, Glom Filt Rate 27 (*)     All other components within normal limits   TROPONIN - Abnormal; Notable for the following components:    Troponin, High Sensitivity 62 (*)     All other components within normal limits   BRAIN NATRIURETIC PEPTIDE - Abnormal; Notable for the following components:    Pro-BNP 7,241 (*)     All other components within normal limits   D-DIMER, QUANTITATIVE - Abnormal; Notable for the following components:    D-Dimer, Quant 563 (*)     All other components within normal

## 2023-10-24 LAB
ALBUMIN SERPL-MCNC: 3 G/DL (ref 3.5–5.2)
ALP SERPL-CCNC: 85 U/L (ref 35–104)
ALT SERPL-CCNC: 8 U/L (ref 0–32)
ANION GAP SERPL CALCULATED.3IONS-SCNC: 11 MMOL/L (ref 7–16)
ANION GAP SERPL CALCULATED.3IONS-SCNC: 11 MMOL/L (ref 7–16)
AST SERPL-CCNC: 12 U/L (ref 0–31)
BASOPHILS # BLD: 0.04 K/UL (ref 0–0.2)
BASOPHILS NFR BLD: 1 % (ref 0–2)
BILIRUB SERPL-MCNC: 0.3 MG/DL (ref 0–1.2)
BUN SERPL-MCNC: 45 MG/DL (ref 6–20)
BUN SERPL-MCNC: 47 MG/DL (ref 6–20)
CALCIUM SERPL-MCNC: 8.5 MG/DL (ref 8.6–10.2)
CALCIUM SERPL-MCNC: 8.8 MG/DL (ref 8.6–10.2)
CHLORIDE SERPL-SCNC: 101 MMOL/L (ref 98–107)
CHLORIDE SERPL-SCNC: 97 MMOL/L (ref 98–107)
CO2 SERPL-SCNC: 23 MMOL/L (ref 22–29)
CO2 SERPL-SCNC: 24 MMOL/L (ref 22–29)
CREAT SERPL-MCNC: 2.1 MG/DL (ref 0.5–1)
CREAT SERPL-MCNC: 2.4 MG/DL (ref 0.5–1)
CRP SERPL HS-MCNC: 32 MG/L (ref 0–5)
DATE LAST DOSE: NORMAL
EOSINOPHIL # BLD: 0.16 K/UL (ref 0.05–0.5)
EOSINOPHILS RELATIVE PERCENT: 3 % (ref 0–6)
ERYTHROCYTE [DISTWIDTH] IN BLOOD BY AUTOMATED COUNT: 15 % (ref 11.5–15)
ERYTHROCYTE [SEDIMENTATION RATE] IN BLOOD BY WESTERGREN METHOD: 17 MM/HR (ref 0–20)
GFR SERPL CREATININE-BSD FRML MDRD: 22 ML/MIN/1.73M2
GFR SERPL CREATININE-BSD FRML MDRD: 27 ML/MIN/1.73M2
GLUCOSE BLD-MCNC: 215 MG/DL (ref 74–99)
GLUCOSE BLD-MCNC: 266 MG/DL (ref 74–99)
GLUCOSE BLD-MCNC: 286 MG/DL (ref 74–99)
GLUCOSE BLD-MCNC: 60 MG/DL (ref 74–99)
GLUCOSE BLD-MCNC: 60 MG/DL (ref 74–99)
GLUCOSE SERPL-MCNC: 311 MG/DL (ref 74–99)
GLUCOSE SERPL-MCNC: 45 MG/DL (ref 74–99)
HCT VFR BLD AUTO: 34.2 % (ref 34–48)
HGB BLD-MCNC: 10.9 G/DL (ref 11.5–15.5)
IMM GRANULOCYTES # BLD AUTO: <0.03 K/UL (ref 0–0.58)
IMM GRANULOCYTES NFR BLD: 0 % (ref 0–5)
LYMPHOCYTES NFR BLD: 1.54 K/UL (ref 1.5–4)
LYMPHOCYTES RELATIVE PERCENT: 27 % (ref 20–42)
MAGNESIUM SERPL-MCNC: 1.5 MG/DL (ref 1.6–2.6)
MCH RBC QN AUTO: 27.3 PG (ref 26–35)
MCHC RBC AUTO-ENTMCNC: 31.9 G/DL (ref 32–34.5)
MCV RBC AUTO: 85.5 FL (ref 80–99.9)
MONOCYTES NFR BLD: 0.63 K/UL (ref 0.1–0.95)
MONOCYTES NFR BLD: 11 % (ref 2–12)
NEUTROPHILS NFR BLD: 58 % (ref 43–80)
NEUTS SEG NFR BLD: 3.3 K/UL (ref 1.8–7.3)
PHOSPHATE SERPL-MCNC: 3.7 MG/DL (ref 2.5–4.5)
PLATELET # BLD AUTO: 388 K/UL (ref 130–450)
PMV BLD AUTO: 9.2 FL (ref 7–12)
POTASSIUM SERPL-SCNC: 3.9 MMOL/L (ref 3.5–5)
POTASSIUM SERPL-SCNC: 5.1 MMOL/L (ref 3.5–5)
PROCALCITONIN SERPL-MCNC: 0.07 NG/ML (ref 0–0.08)
PROT SERPL-MCNC: 6.3 G/DL (ref 6.4–8.3)
RBC # BLD AUTO: 4 M/UL (ref 3.5–5.5)
SODIUM SERPL-SCNC: 131 MMOL/L (ref 132–146)
SODIUM SERPL-SCNC: 136 MMOL/L (ref 132–146)
T4 FREE SERPL-MCNC: 1.2 NG/DL (ref 0.9–1.7)
TME LAST DOSE: NORMAL H
TSH SERPL DL<=0.05 MIU/L-ACNC: 2.32 UIU/ML (ref 0.27–4.2)
VANCOMYCIN DOSE: NORMAL MG
VANCOMYCIN SERPL-MCNC: 11.3 UG/ML (ref 5–40)
WBC OTHER # BLD: 5.7 K/UL (ref 4.5–11.5)

## 2023-10-24 PROCEDURE — 83735 ASSAY OF MAGNESIUM: CPT

## 2023-10-24 PROCEDURE — 84443 ASSAY THYROID STIM HORMONE: CPT

## 2023-10-24 PROCEDURE — 2060000000 HC ICU INTERMEDIATE R&B

## 2023-10-24 PROCEDURE — P9047 ALBUMIN (HUMAN), 25%, 50ML: HCPCS | Performed by: INTERNAL MEDICINE

## 2023-10-24 PROCEDURE — 6370000000 HC RX 637 (ALT 250 FOR IP): Performed by: INTERNAL MEDICINE

## 2023-10-24 PROCEDURE — 2580000003 HC RX 258: Performed by: INTERNAL MEDICINE

## 2023-10-24 PROCEDURE — C8929 TTE W OR WO FOL WCON,DOPPLER: HCPCS

## 2023-10-24 PROCEDURE — 80048 BASIC METABOLIC PNL TOTAL CA: CPT

## 2023-10-24 PROCEDURE — 2580000003 HC RX 258: Performed by: NURSE PRACTITIONER

## 2023-10-24 PROCEDURE — 36415 COLL VENOUS BLD VENIPUNCTURE: CPT

## 2023-10-24 PROCEDURE — 86140 C-REACTIVE PROTEIN: CPT

## 2023-10-24 PROCEDURE — 84439 ASSAY OF FREE THYROXINE: CPT

## 2023-10-24 PROCEDURE — 94640 AIRWAY INHALATION TREATMENT: CPT

## 2023-10-24 PROCEDURE — 6360000002 HC RX W HCPCS: Performed by: INTERNAL MEDICINE

## 2023-10-24 PROCEDURE — 6360000004 HC RX CONTRAST MEDICATION: Performed by: NURSE PRACTITIONER

## 2023-10-24 PROCEDURE — 80202 ASSAY OF VANCOMYCIN: CPT

## 2023-10-24 PROCEDURE — 85652 RBC SED RATE AUTOMATED: CPT

## 2023-10-24 PROCEDURE — 84100 ASSAY OF PHOSPHORUS: CPT

## 2023-10-24 PROCEDURE — 82962 GLUCOSE BLOOD TEST: CPT

## 2023-10-24 PROCEDURE — 85025 COMPLETE CBC W/AUTO DIFF WBC: CPT

## 2023-10-24 PROCEDURE — 6360000002 HC RX W HCPCS: Performed by: NURSE PRACTITIONER

## 2023-10-24 PROCEDURE — 93010 ELECTROCARDIOGRAM REPORT: CPT | Performed by: INTERNAL MEDICINE

## 2023-10-24 PROCEDURE — 84145 PROCALCITONIN (PCT): CPT

## 2023-10-24 PROCEDURE — 80053 COMPREHEN METABOLIC PANEL: CPT

## 2023-10-24 RX ORDER — MAGNESIUM SULFATE IN WATER 40 MG/ML
2000 INJECTION, SOLUTION INTRAVENOUS PRN
Status: DISCONTINUED | OUTPATIENT
Start: 2023-10-24 | End: 2023-10-29 | Stop reason: HOSPADM

## 2023-10-24 RX ORDER — FERROUS SULFATE 325(65) MG
325 TABLET ORAL
COMMUNITY

## 2023-10-24 RX ORDER — POTASSIUM CHLORIDE 20 MEQ/1
20 TABLET, EXTENDED RELEASE ORAL DAILY
Status: DISCONTINUED | OUTPATIENT
Start: 2023-10-24 | End: 2023-10-29 | Stop reason: HOSPADM

## 2023-10-24 RX ORDER — FUROSEMIDE 20 MG/1
20 TABLET ORAL DAILY
Status: DISCONTINUED | OUTPATIENT
Start: 2023-10-24 | End: 2023-10-29 | Stop reason: HOSPADM

## 2023-10-24 RX ORDER — POTASSIUM CHLORIDE 20 MEQ/1
40 TABLET, EXTENDED RELEASE ORAL PRN
Status: DISCONTINUED | OUTPATIENT
Start: 2023-10-24 | End: 2023-10-29 | Stop reason: HOSPADM

## 2023-10-24 RX ORDER — INSULIN LISPRO 100 [IU]/ML
0-8 INJECTION, SOLUTION INTRAVENOUS; SUBCUTANEOUS
Status: DISCONTINUED | OUTPATIENT
Start: 2023-10-24 | End: 2023-10-29 | Stop reason: HOSPADM

## 2023-10-24 RX ORDER — POTASSIUM CHLORIDE 7.45 MG/ML
10 INJECTION INTRAVENOUS PRN
Status: DISCONTINUED | OUTPATIENT
Start: 2023-10-24 | End: 2023-10-29 | Stop reason: HOSPADM

## 2023-10-24 RX ORDER — DEXTROSE MONOHYDRATE 100 MG/ML
INJECTION, SOLUTION INTRAVENOUS CONTINUOUS PRN
Status: DISCONTINUED | OUTPATIENT
Start: 2023-10-24 | End: 2023-10-29 | Stop reason: HOSPADM

## 2023-10-24 RX ORDER — FUROSEMIDE 10 MG/ML
40 INJECTION INTRAMUSCULAR; INTRAVENOUS DAILY
Status: DISCONTINUED | OUTPATIENT
Start: 2023-10-24 | End: 2023-10-24

## 2023-10-24 RX ORDER — MONTELUKAST SODIUM 10 MG/1
10 TABLET ORAL NIGHTLY
Status: DISCONTINUED | OUTPATIENT
Start: 2023-10-24 | End: 2023-10-29 | Stop reason: HOSPADM

## 2023-10-24 RX ORDER — ATORVASTATIN CALCIUM 40 MG/1
40 TABLET, FILM COATED ORAL NIGHTLY
Status: DISCONTINUED | OUTPATIENT
Start: 2023-10-24 | End: 2023-10-29 | Stop reason: HOSPADM

## 2023-10-24 RX ORDER — INSULIN GLARGINE 100 [IU]/ML
30 INJECTION, SOLUTION SUBCUTANEOUS 2 TIMES DAILY
Status: DISCONTINUED | OUTPATIENT
Start: 2023-10-24 | End: 2023-10-29 | Stop reason: HOSPADM

## 2023-10-24 RX ORDER — ALBUMIN (HUMAN) 12.5 G/50ML
25 SOLUTION INTRAVENOUS ONCE
Status: COMPLETED | OUTPATIENT
Start: 2023-10-24 | End: 2023-10-24

## 2023-10-24 RX ORDER — FUROSEMIDE 10 MG/ML
40 INJECTION INTRAMUSCULAR; INTRAVENOUS 2 TIMES DAILY
Status: DISCONTINUED | OUTPATIENT
Start: 2023-10-24 | End: 2023-10-29

## 2023-10-24 RX ORDER — INSULIN LISPRO 100 [IU]/ML
0-4 INJECTION, SOLUTION INTRAVENOUS; SUBCUTANEOUS NIGHTLY
Status: DISCONTINUED | OUTPATIENT
Start: 2023-10-24 | End: 2023-10-29 | Stop reason: HOSPADM

## 2023-10-24 RX ORDER — MONTELUKAST SODIUM 10 MG/1
10 TABLET ORAL NIGHTLY
COMMUNITY

## 2023-10-24 RX ADMIN — ASPIRIN 81 MG: 81 TABLET, COATED ORAL at 07:56

## 2023-10-24 RX ADMIN — SACUBITRIL AND VALSARTAN 1 TABLET: 24; 26 TABLET, FILM COATED ORAL at 20:17

## 2023-10-24 RX ADMIN — CEFEPIME 2000 MG: 2 INJECTION, POWDER, FOR SOLUTION INTRAVENOUS at 12:00

## 2023-10-24 RX ADMIN — SODIUM CHLORIDE, PRESERVATIVE FREE 10 ML: 5 INJECTION INTRAVENOUS at 20:17

## 2023-10-24 RX ADMIN — INSULIN GLARGINE 30 UNITS: 100 INJECTION, SOLUTION SUBCUTANEOUS at 20:19

## 2023-10-24 RX ADMIN — BUSPIRONE HYDROCHLORIDE 5 MG: 5 TABLET ORAL at 07:56

## 2023-10-24 RX ADMIN — TRAZODONE HYDROCHLORIDE 50 MG: 50 TABLET ORAL at 01:25

## 2023-10-24 RX ADMIN — GABAPENTIN 300 MG: 300 CAPSULE ORAL at 01:24

## 2023-10-24 RX ADMIN — CLOPIDOGREL BISULFATE 75 MG: 75 TABLET ORAL at 07:56

## 2023-10-24 RX ADMIN — INSULIN LISPRO 4 UNITS: 100 INJECTION, SOLUTION INTRAVENOUS; SUBCUTANEOUS at 17:33

## 2023-10-24 RX ADMIN — FUROSEMIDE 40 MG: 10 INJECTION, SOLUTION INTRAMUSCULAR; INTRAVENOUS at 07:57

## 2023-10-24 RX ADMIN — ENOXAPARIN SODIUM 30 MG: 100 INJECTION SUBCUTANEOUS at 20:18

## 2023-10-24 RX ADMIN — ALBUMIN (HUMAN) 25 G: 0.25 INJECTION, SOLUTION INTRAVENOUS at 21:11

## 2023-10-24 RX ADMIN — FUROSEMIDE 40 MG: 10 INJECTION, SOLUTION INTRAMUSCULAR; INTRAVENOUS at 18:17

## 2023-10-24 RX ADMIN — BUDESONIDE 250 MCG: 0.25 SUSPENSION RESPIRATORY (INHALATION) at 06:12

## 2023-10-24 RX ADMIN — BUSPIRONE HYDROCHLORIDE 5 MG: 5 TABLET ORAL at 20:17

## 2023-10-24 RX ADMIN — POTASSIUM CHLORIDE 20 MEQ: 1500 TABLET, EXTENDED RELEASE ORAL at 07:56

## 2023-10-24 RX ADMIN — DULOXETINE HYDROCHLORIDE 60 MG: 60 CAPSULE, DELAYED RELEASE ORAL at 07:56

## 2023-10-24 RX ADMIN — METOPROLOL SUCCINATE 25 MG: 25 TABLET, EXTENDED RELEASE ORAL at 10:31

## 2023-10-24 RX ADMIN — TRAZODONE HYDROCHLORIDE 50 MG: 50 TABLET ORAL at 20:17

## 2023-10-24 RX ADMIN — SACUBITRIL AND VALSARTAN 1 TABLET: 24; 26 TABLET, FILM COATED ORAL at 01:24

## 2023-10-24 RX ADMIN — ATORVASTATIN CALCIUM 40 MG: 40 TABLET, FILM COATED ORAL at 20:17

## 2023-10-24 RX ADMIN — ENOXAPARIN SODIUM 30 MG: 100 INJECTION SUBCUTANEOUS at 07:56

## 2023-10-24 RX ADMIN — FLUTICASONE PROPIONATE 1 SPRAY: 50 SPRAY, METERED NASAL at 07:56

## 2023-10-24 RX ADMIN — ARFORMOTEROL TARTRATE 15 MCG: 15 SOLUTION RESPIRATORY (INHALATION) at 06:12

## 2023-10-24 RX ADMIN — PERFLUTREN 2 ML: 6.52 INJECTION, SUSPENSION INTRAVENOUS at 14:08

## 2023-10-24 RX ADMIN — MONTELUKAST 10 MG: 10 TABLET, FILM COATED ORAL at 20:17

## 2023-10-24 RX ADMIN — PANTOPRAZOLE SODIUM 40 MG: 40 TABLET, DELAYED RELEASE ORAL at 06:02

## 2023-10-24 RX ADMIN — MAGNESIUM SULFATE HEPTAHYDRATE 2000 MG: 2 INJECTION, SOLUTION INTRAVENOUS at 16:10

## 2023-10-24 RX ADMIN — BUSPIRONE HYDROCHLORIDE 5 MG: 5 TABLET ORAL at 01:25

## 2023-10-24 RX ADMIN — VANCOMYCIN HYDROCHLORIDE 750 MG: 5 INJECTION, POWDER, LYOPHILIZED, FOR SOLUTION INTRAVENOUS at 16:27

## 2023-10-24 RX ADMIN — GABAPENTIN 300 MG: 300 CAPSULE ORAL at 20:17

## 2023-10-24 RX ADMIN — ENOXAPARIN SODIUM 30 MG: 100 INJECTION SUBCUTANEOUS at 01:25

## 2023-10-24 RX ADMIN — SODIUM CHLORIDE, PRESERVATIVE FREE 10 ML: 5 INJECTION INTRAVENOUS at 01:27

## 2023-10-24 RX ADMIN — SODIUM CHLORIDE, PRESERVATIVE FREE 10 ML: 5 INJECTION INTRAVENOUS at 07:57

## 2023-10-24 RX ADMIN — SACUBITRIL AND VALSARTAN 1 TABLET: 24; 26 TABLET, FILM COATED ORAL at 07:56

## 2023-10-24 NOTE — CARE COORDINATION
Case Management Assessment  Initial Evaluation    Date/Time of Evaluation: 10/24/2023 12:44 PM  Assessment Completed by: Kamilah Banuelos RN    If patient is discharged prior to next notation, then this note serves as note for discharge by case management. Patient Name: Bhupendra Ortega                   YOB: 1965  Diagnosis: Acute on chronic combined systolic and diastolic CHF (congestive heart failure) ([address]) [I50.43]  Cellulitis of lower extremity, unspecified laterality [L03.119]  Acute renal failure, unspecified acute renal failure type ([address]) [N17.9]  Hypervolemia, unspecified hypervolemia type [E87.70]                   Date / Time: 10/23/2023  5:27 PM    Patient Admission Status: Inpatient   Readmission Risk (Low < 19, Mod (19-27), High > 27): Readmission Risk Score: 17.6    Current PCP: Carmen Arora MD  PCP verified by CM? Yes    Chart Reviewed: Yes      History Provided by: Patient  Patient Orientation: Alert and Oriented, Person, Place, Situation, Self    Patient Cognition: Alert    Hospitalization in the last 30 days (Readmission):  No    If yes, Readmission Assessment in CM Navigator will be completed. Advance Directives:      Code Status: DNR-CCA   Patient's Primary Decision Maker is: Legal Next of Kin    [facility]  [place] - [phone]    Discharge Planning:    Patient lives with: Alone Type of Home: House  Primary Care Giver: Self  Patient Support Systems include: Family Members, Children, Friends/Neighbors   Current Financial resources:    Current community resources:    Current services prior to admission: C-pap            Current DME:              Type of Home Care services:  None    ADLS  Prior functional level: Independent in ADLs/IADLs  Current functional level: Independent in ADLs/IADLs    PT AM-PAC:   /24  OT AM-PAC:   /24    Family can provide assistance at DC:  Yes  Would you like Case Management to discuss the discharge plan with any other

## 2023-10-24 NOTE — H&P
Department of Internal Medicine  History and Physical    PCP: Jodie Menendez MD  Admitting Physician: Dr. Yanira Hudson  Consultants:   Date of Service: 10/23/2023    CHIEF COMPLAINT:  lower extremity edema    HISTORY OF PRESENT ILLNESS:    Patient is 62-year-old female presented to the ED due to bilateral lower extremity edema. Patient states that this has been going on for last 2 weeks. She has associated burning sensation reaching which has worsened. Patient was placed on Bactrim for the last few days without improvement in her swelling or redness. She was advised to wear LAST hose however has not been doing that. She denies any subjective fever or chills.   She continues to smoke    PAST MEDICAL Hx:  Past Medical History:   Diagnosis Date    Anxiety     CAD (coronary artery disease)     x5 stents    Cardiomyopathy ([address])     Cerebral artery occlusion with cerebral infarction ([address])     rt sided weakness  cane    COPD (chronic obstructive pulmonary disease) ([address])     Depression     Diabetes mellitus ([address])     IDDM    Hx of cardiovascular stress test 03/22/2016    Nuclear Lexiscan Stress Test  3/22/2016  6/11/2021    Hyperlipidemia     Nonrheumatic mitral (valve) insufficiency     Nonrheumatic tricuspid (valve) insufficiency     Sleep apnea     cpap 7.5    Smoker     current 1PPD X35yrs       PAST SURGICAL Hx:   Past Surgical History:   Procedure Laterality Date    ABDOMEN SURGERY      ABDOMINAL EXPLORATION SURGERY N/A 6-5-16    Excision perforated mid body gastric ulcer, primary closure omentum patch    BACK SURGERY      cages rods lower back    CARDIAC CATHETERIZATION  05/03/2018    Dr. Job [place]      2018 triple bypass    DIAGNOSTIC CARDIAC CATH LAB PROCEDURE  05/03/2018    Dr. Laurie Frias multiple PCIs     HYSTERECTOMY (CERVIX STATUS UNKNOWN)      KNEE ARTHROPLASTY      TOE AMPUTATION Right 1/23/2021    RIGHT SECOND

## 2023-10-24 NOTE — ACP (ADVANCE CARE PLANNING)
Advance Care Planning   Healthcare Decision Maker:    Primary Decision Maker: Maya Quintana [facility] - [phone]

## 2023-10-24 NOTE — ED NOTES
Radiology Procedure Waiver   Name: Zeke Murdock  : 1965  MRN: [id number]    Date:  10/23/23    Time: 8:08 PM EDT    Benefits of immediately proceeding with radiology exam(s) without pre-testing outweigh the risks or are not indicated as specified below and therefore the following is/are being waived:    [x] Benefits of immediate radiology exam(s) outweigh any risk. OR    Pre-exam testing is not indicated for the following reason(s):  [] Pregnancy test   [] Patients LMP on-time and regular.   [] Patient had Tubal Ligation or has other Contraception Device. [] Patient  is Menopausal or Premenarcheal.    [] Patient had Full or Partial Hysterectomy. [] Protocol for CT contrast allegry   [] Patient has tolerated well previously   [] Patient does not have a true allergy    [] MRI Questionnaire     [x] BUN/Creatinine   [] Patient age w/no hx of renal dysfunction. [] Patient on Dialysis. [] Recent Normal Labs.   Electronically signed by Mady Parry DO on 10/23/23 at 8:08 PM EDT               Mady Parry DO  10/23/23 2009

## 2023-10-25 ENCOUNTER — APPOINTMENT (OUTPATIENT)
Dept: ULTRASOUND IMAGING | Age: 58
DRG: 871 | End: 2023-10-25
Payer: MEDICARE

## 2023-10-25 PROBLEM — I48.3 TYPICAL ATRIAL FLUTTER (HCC): Status: ACTIVE | Noted: 2023-10-25

## 2023-10-25 PROBLEM — E66.01 MORBID OBESITY (HCC): Status: ACTIVE | Noted: 2023-10-25

## 2023-10-25 PROBLEM — F17.200 SMOKING: Status: ACTIVE | Noted: 2023-10-25

## 2023-10-25 LAB
ALBUMIN SERPL-MCNC: 3.2 G/DL (ref 3.5–5.2)
ALP SERPL-CCNC: 83 U/L (ref 35–104)
ALT SERPL-CCNC: 7 U/L (ref 0–32)
ANION GAP SERPL CALCULATED.3IONS-SCNC: 10 MMOL/L (ref 7–16)
ASO AB SERPL-ACNC: 102 IU/ML (ref 0–200)
AST SERPL-CCNC: 13 U/L (ref 0–31)
BASOPHILS # BLD: 0.05 K/UL (ref 0–0.2)
BASOPHILS NFR BLD: 1 % (ref 0–2)
BILIRUB SERPL-MCNC: 0.4 MG/DL (ref 0–1.2)
BILIRUB UR QL STRIP: NEGATIVE
BUN SERPL-MCNC: 53 MG/DL (ref 6–20)
CALCIUM SERPL-MCNC: 8.9 MG/DL (ref 8.6–10.2)
CHLORIDE SERPL-SCNC: 99 MMOL/L (ref 98–107)
CLARITY UR: CLEAR
CO2 SERPL-SCNC: 26 MMOL/L (ref 22–29)
COLOR UR: YELLOW
CREAT SERPL-MCNC: 2.5 MG/DL (ref 0.5–1)
CREAT UR-MCNC: 75.3 MG/DL (ref 29–226)
CREAT UR-MCNC: 76.3 MG/DL (ref 29–226)
CREAT UR-MCNC: 76.3 MG/DL (ref 29–226)
DATE LAST DOSE: NORMAL
EOSINOPHIL # BLD: 0.16 K/UL (ref 0.05–0.5)
EOSINOPHILS RELATIVE PERCENT: 3 % (ref 0–6)
ERYTHROCYTE [DISTWIDTH] IN BLOOD BY AUTOMATED COUNT: 15.2 % (ref 11.5–15)
GFR SERPL CREATININE-BSD FRML MDRD: 22 ML/MIN/1.73M2
GLUCOSE BLD-MCNC: 226 MG/DL (ref 74–99)
GLUCOSE BLD-MCNC: 227 MG/DL (ref 74–99)
GLUCOSE BLD-MCNC: 236 MG/DL (ref 74–99)
GLUCOSE BLD-MCNC: 77 MG/DL (ref 74–99)
GLUCOSE SERPL-MCNC: 75 MG/DL (ref 74–99)
GLUCOSE UR STRIP-MCNC: NEGATIVE MG/DL
HBA1C MFR BLD: 8.5 % (ref 4–5.6)
HCT VFR BLD AUTO: 33.5 % (ref 34–48)
HGB BLD-MCNC: 10.4 G/DL (ref 11.5–15.5)
HGB UR QL STRIP.AUTO: NEGATIVE
IMM GRANULOCYTES # BLD AUTO: <0.03 K/UL (ref 0–0.58)
IMM GRANULOCYTES NFR BLD: 0 % (ref 0–5)
KETONES UR STRIP-MCNC: NEGATIVE MG/DL
LEUKOCYTE ESTERASE UR QL STRIP: NEGATIVE
LYMPHOCYTES NFR BLD: 1.42 K/UL (ref 1.5–4)
LYMPHOCYTES RELATIVE PERCENT: 27 % (ref 20–42)
MAGNESIUM SERPL-MCNC: 1.9 MG/DL (ref 1.6–2.6)
MCH RBC QN AUTO: 26.8 PG (ref 26–35)
MCHC RBC AUTO-ENTMCNC: 31 G/DL (ref 32–34.5)
MCV RBC AUTO: 86.3 FL (ref 80–99.9)
MICROALBUMIN UR-MCNC: 74 MG/L (ref 0–19)
MICROALBUMIN/CREAT UR-RTO: 97 MCG/MG CREAT (ref 0–30)
MONOCYTES NFR BLD: 0.66 K/UL (ref 0.1–0.95)
MONOCYTES NFR BLD: 13 % (ref 2–12)
NEUTROPHILS NFR BLD: 56 % (ref 43–80)
NEUTS SEG NFR BLD: 2.91 K/UL (ref 1.8–7.3)
NITRITE UR QL STRIP: NEGATIVE
PH UR STRIP: 5.5 [PH] (ref 5–9)
PHOSPHATE SERPL-MCNC: 3.9 MG/DL (ref 2.5–4.5)
PLATELET # BLD AUTO: 376 K/UL (ref 130–450)
PMV BLD AUTO: 9.3 FL (ref 7–12)
POTASSIUM SERPL-SCNC: 4.7 MMOL/L (ref 3.5–5)
PROCALCITONIN SERPL-MCNC: 0.07 NG/ML (ref 0–0.08)
PROT SERPL-MCNC: 6.3 G/DL (ref 6.4–8.3)
PROT UR STRIP-MCNC: NEGATIVE MG/DL
RBC # BLD AUTO: 3.88 M/UL (ref 3.5–5.5)
RBC #/AREA URNS HPF: NORMAL /HPF
SODIUM SERPL-SCNC: 135 MMOL/L (ref 132–146)
SODIUM UR-SCNC: 37 MMOL/L
SP GR UR STRIP: 1.01 (ref 1–1.03)
TME LAST DOSE: NORMAL H
TOTAL PROTEIN, URINE: 18 MG/DL (ref 0–12)
URINE TOTAL PROTEIN CREATININE RATIO: 0.24 (ref 0–0.2)
UROBILINOGEN UR STRIP-ACNC: 0.2 EU/DL (ref 0–1)
VANCOMYCIN DOSE: NORMAL MG
VANCOMYCIN SERPL-MCNC: 9.9 UG/ML (ref 5–40)
WBC #/AREA URNS HPF: NORMAL /HPF
WBC OTHER # BLD: 5.2 K/UL (ref 4.5–11.5)

## 2023-10-25 PROCEDURE — 85025 COMPLETE CBC W/AUTO DIFF WBC: CPT

## 2023-10-25 PROCEDURE — 2700000000 HC OXYGEN THERAPY PER DAY

## 2023-10-25 PROCEDURE — 82962 GLUCOSE BLOOD TEST: CPT

## 2023-10-25 PROCEDURE — 6360000002 HC RX W HCPCS: Performed by: NURSE PRACTITIONER

## 2023-10-25 PROCEDURE — 84145 PROCALCITONIN (PCT): CPT

## 2023-10-25 PROCEDURE — 76775 US EXAM ABDO BACK WALL LIM: CPT

## 2023-10-25 PROCEDURE — 86063 ANTISTREPTOLYSIN O SCREEN: CPT

## 2023-10-25 PROCEDURE — 84300 ASSAY OF URINE SODIUM: CPT

## 2023-10-25 PROCEDURE — 80202 ASSAY OF VANCOMYCIN: CPT

## 2023-10-25 PROCEDURE — 6370000000 HC RX 637 (ALT 250 FOR IP): Performed by: INTERNAL MEDICINE

## 2023-10-25 PROCEDURE — 80053 COMPREHEN METABOLIC PANEL: CPT

## 2023-10-25 PROCEDURE — 6360000002 HC RX W HCPCS: Performed by: INTERNAL MEDICINE

## 2023-10-25 PROCEDURE — 82570 ASSAY OF URINE CREATININE: CPT

## 2023-10-25 PROCEDURE — APPSS60 APP SPLIT SHARED TIME 46-60 MINUTES: Performed by: PHYSICIAN ASSISTANT

## 2023-10-25 PROCEDURE — 2580000003 HC RX 258: Performed by: NURSE PRACTITIONER

## 2023-10-25 PROCEDURE — 84100 ASSAY OF PHOSPHORUS: CPT

## 2023-10-25 PROCEDURE — 36415 COLL VENOUS BLD VENIPUNCTURE: CPT

## 2023-10-25 PROCEDURE — 83036 HEMOGLOBIN GLYCOSYLATED A1C: CPT

## 2023-10-25 PROCEDURE — 2580000003 HC RX 258: Performed by: INTERNAL MEDICINE

## 2023-10-25 PROCEDURE — 84156 ASSAY OF PROTEIN URINE: CPT

## 2023-10-25 PROCEDURE — 93005 ELECTROCARDIOGRAM TRACING: CPT | Performed by: PHYSICIAN ASSISTANT

## 2023-10-25 PROCEDURE — 2060000000 HC ICU INTERMEDIATE R&B

## 2023-10-25 PROCEDURE — 83735 ASSAY OF MAGNESIUM: CPT

## 2023-10-25 PROCEDURE — 81001 URINALYSIS AUTO W/SCOPE: CPT

## 2023-10-25 PROCEDURE — 94640 AIRWAY INHALATION TREATMENT: CPT

## 2023-10-25 PROCEDURE — 99223 1ST HOSP IP/OBS HIGH 75: CPT | Performed by: INTERNAL MEDICINE

## 2023-10-25 PROCEDURE — 82043 UR ALBUMIN QUANTITATIVE: CPT

## 2023-10-25 RX ORDER — SODIUM CHLORIDE 0.9 % (FLUSH) 0.9 %
5-40 SYRINGE (ML) INJECTION EVERY 12 HOURS SCHEDULED
Status: DISCONTINUED | OUTPATIENT
Start: 2023-10-25 | End: 2023-10-29 | Stop reason: HOSPADM

## 2023-10-25 RX ORDER — SODIUM CHLORIDE 9 MG/ML
INJECTION, SOLUTION INTRAVENOUS PRN
Status: DISCONTINUED | OUTPATIENT
Start: 2023-10-25 | End: 2023-10-29 | Stop reason: HOSPADM

## 2023-10-25 RX ORDER — SODIUM CHLORIDE 0.9 % (FLUSH) 0.9 %
5-40 SYRINGE (ML) INJECTION PRN
Status: DISCONTINUED | OUTPATIENT
Start: 2023-10-25 | End: 2023-10-29 | Stop reason: HOSPADM

## 2023-10-25 RX ADMIN — INSULIN GLARGINE 30 UNITS: 100 INJECTION, SOLUTION SUBCUTANEOUS at 09:40

## 2023-10-25 RX ADMIN — FUROSEMIDE 40 MG: 10 INJECTION, SOLUTION INTRAMUSCULAR; INTRAVENOUS at 17:07

## 2023-10-25 RX ADMIN — PANTOPRAZOLE SODIUM 40 MG: 40 TABLET, DELAYED RELEASE ORAL at 06:08

## 2023-10-25 RX ADMIN — BUDESONIDE 250 MCG: 0.25 SUSPENSION RESPIRATORY (INHALATION) at 19:53

## 2023-10-25 RX ADMIN — MONTELUKAST 10 MG: 10 TABLET, FILM COATED ORAL at 21:35

## 2023-10-25 RX ADMIN — GABAPENTIN 300 MG: 300 CAPSULE ORAL at 21:35

## 2023-10-25 RX ADMIN — BUDESONIDE 250 MCG: 0.25 SUSPENSION RESPIRATORY (INHALATION) at 06:31

## 2023-10-25 RX ADMIN — BUSPIRONE HYDROCHLORIDE 5 MG: 5 TABLET ORAL at 09:38

## 2023-10-25 RX ADMIN — ASPIRIN 81 MG: 81 TABLET, COATED ORAL at 09:38

## 2023-10-25 RX ADMIN — SACUBITRIL AND VALSARTAN 1 TABLET: 24; 26 TABLET, FILM COATED ORAL at 09:38

## 2023-10-25 RX ADMIN — POTASSIUM CHLORIDE 20 MEQ: 1500 TABLET, EXTENDED RELEASE ORAL at 09:38

## 2023-10-25 RX ADMIN — ARFORMOTEROL TARTRATE 15 MCG: 15 SOLUTION RESPIRATORY (INHALATION) at 06:31

## 2023-10-25 RX ADMIN — SODIUM CHLORIDE, PRESERVATIVE FREE 10 ML: 5 INJECTION INTRAVENOUS at 09:39

## 2023-10-25 RX ADMIN — ENOXAPARIN SODIUM 30 MG: 100 INJECTION SUBCUTANEOUS at 09:37

## 2023-10-25 RX ADMIN — INSULIN LISPRO 2 UNITS: 100 INJECTION, SOLUTION INTRAVENOUS; SUBCUTANEOUS at 12:43

## 2023-10-25 RX ADMIN — METOPROLOL SUCCINATE 25 MG: 25 TABLET, EXTENDED RELEASE ORAL at 09:38

## 2023-10-25 RX ADMIN — ARFORMOTEROL TARTRATE 15 MCG: 15 SOLUTION RESPIRATORY (INHALATION) at 19:53

## 2023-10-25 RX ADMIN — FUROSEMIDE 40 MG: 10 INJECTION, SOLUTION INTRAMUSCULAR; INTRAVENOUS at 09:39

## 2023-10-25 RX ADMIN — ENOXAPARIN SODIUM 30 MG: 100 INJECTION SUBCUTANEOUS at 21:34

## 2023-10-25 RX ADMIN — SODIUM CHLORIDE, PRESERVATIVE FREE 10 ML: 5 INJECTION INTRAVENOUS at 21:35

## 2023-10-25 RX ADMIN — TRAZODONE HYDROCHLORIDE 50 MG: 50 TABLET ORAL at 21:35

## 2023-10-25 RX ADMIN — FLUTICASONE PROPIONATE 1 SPRAY: 50 SPRAY, METERED NASAL at 09:44

## 2023-10-25 RX ADMIN — CLOPIDOGREL BISULFATE 75 MG: 75 TABLET ORAL at 09:38

## 2023-10-25 RX ADMIN — DULOXETINE HYDROCHLORIDE 60 MG: 60 CAPSULE, DELAYED RELEASE ORAL at 09:38

## 2023-10-25 RX ADMIN — VANCOMYCIN HYDROCHLORIDE 1000 MG: 1 INJECTION, POWDER, LYOPHILIZED, FOR SOLUTION INTRAVENOUS at 21:33

## 2023-10-25 RX ADMIN — BUSPIRONE HYDROCHLORIDE 5 MG: 5 TABLET ORAL at 21:35

## 2023-10-25 RX ADMIN — CEFEPIME 2000 MG: 2 INJECTION, POWDER, FOR SOLUTION INTRAVENOUS at 12:57

## 2023-10-25 RX ADMIN — INSULIN LISPRO 2 UNITS: 100 INJECTION, SOLUTION INTRAVENOUS; SUBCUTANEOUS at 17:06

## 2023-10-25 RX ADMIN — ATORVASTATIN CALCIUM 40 MG: 40 TABLET, FILM COATED ORAL at 21:35

## 2023-10-25 ASSESSMENT — PAIN SCALES - GENERAL
PAINLEVEL_OUTOF10: 0

## 2023-10-25 NOTE — CARE COORDINATION
10/25/2023 1100 CM Note:  Pt plan is to return home as prior. Pt is independent and drives. Pt has a Cpap through [facility] and if O2 is needed she will need a walk test and wants it through [facility] as well. If pt's needs IV antibiotics at d/c, she has no preference in [address] and has a friend that can learn to administer antibiotics. Pt also has been to the [address] [facility] in the past.  CM will follow for possible antibiotics. Pt has transportation home.  Electronically signed by Rudy Lynne RN on 10/25/2023 at 11:30 AM

## 2023-10-26 ENCOUNTER — ANESTHESIA EVENT (OUTPATIENT)
Dept: POSTOP/PACU | Age: 58
End: 2023-10-26
Payer: MEDICARE

## 2023-10-26 ENCOUNTER — HOSPITAL ENCOUNTER (INPATIENT)
Dept: POSTOP/PACU | Age: 58
Discharge: HOME | DRG: 871 | End: 2023-10-26
Payer: MEDICARE

## 2023-10-26 ENCOUNTER — ANESTHESIA (OUTPATIENT)
Dept: POSTOP/PACU | Age: 58
End: 2023-10-26
Payer: MEDICARE

## 2023-10-26 VITALS
OXYGEN SATURATION: 99 % | TEMPERATURE: 98 F | HEART RATE: 104 BPM | DIASTOLIC BLOOD PRESSURE: 74 MMHG | SYSTOLIC BLOOD PRESSURE: 117 MMHG

## 2023-10-26 PROBLEM — I27.20 PULMONARY HYPERTENSION (HCC): Status: ACTIVE | Noted: 2023-10-26

## 2023-10-26 PROBLEM — I50.810 RVF (RIGHT VENTRICULAR FAILURE) (HCC): Status: ACTIVE | Noted: 2023-10-26

## 2023-10-26 LAB
ALBUMIN SERPL-MCNC: 3.3 G/DL (ref 3.5–5.2)
ALP SERPL-CCNC: 92 U/L (ref 35–104)
ALT SERPL-CCNC: 8 U/L (ref 0–32)
ANION GAP SERPL CALCULATED.3IONS-SCNC: 11 MMOL/L (ref 7–16)
AST SERPL-CCNC: 14 U/L (ref 0–31)
BASOPHILS # BLD: 0.06 K/UL (ref 0–0.2)
BASOPHILS NFR BLD: 1 % (ref 0–2)
BILIRUB SERPL-MCNC: 0.4 MG/DL (ref 0–1.2)
BUN SERPL-MCNC: 55 MG/DL (ref 6–20)
CA-I BLD-SCNC: 1.19 MMOL/L (ref 1.15–1.33)
CALCIUM SERPL-MCNC: 9.1 MG/DL (ref 8.6–10.2)
CHLORIDE SERPL-SCNC: 99 MMOL/L (ref 98–107)
CO2 SERPL-SCNC: 25 MMOL/L (ref 22–29)
CREAT SERPL-MCNC: 2.6 MG/DL (ref 0.5–1)
DATE LAST DOSE: NORMAL
EKG ATRIAL RATE: 102 BPM
EKG ATRIAL RATE: 128 BPM
EKG ATRIAL RATE: 131 BPM
EKG P AXIS: 83 DEGREES
EKG P-R INTERVAL: 164 MS
EKG P-R INTERVAL: 184 MS
EKG P-R INTERVAL: 188 MS
EKG Q-T INTERVAL: 284 MS
EKG Q-T INTERVAL: 302 MS
EKG Q-T INTERVAL: 358 MS
EKG QRS DURATION: 116 MS
EKG QRS DURATION: 124 MS
EKG QRS DURATION: 86 MS
EKG QTC CALCULATION (BAZETT): 419 MS
EKG QTC CALCULATION (BAZETT): 440 MS
EKG QTC CALCULATION (BAZETT): 466 MS
EKG R AXIS: 38 DEGREES
EKG R AXIS: 47 DEGREES
EKG R AXIS: 55 DEGREES
EKG T AXIS: 138 DEGREES
EKG T AXIS: 166 DEGREES
EKG T AXIS: 77 DEGREES
EKG VENTRICULAR RATE: 102 BPM
EKG VENTRICULAR RATE: 128 BPM
EKG VENTRICULAR RATE: 131 BPM
EOSINOPHIL # BLD: 0.15 K/UL (ref 0.05–0.5)
EOSINOPHILS RELATIVE PERCENT: 3 % (ref 0–6)
ERYTHROCYTE [DISTWIDTH] IN BLOOD BY AUTOMATED COUNT: 15.2 % (ref 11.5–15)
GFR SERPL CREATININE-BSD FRML MDRD: 21 ML/MIN/1.73M2
GLUCOSE BLD-MCNC: 182 MG/DL (ref 74–99)
GLUCOSE BLD-MCNC: 219 MG/DL (ref 74–99)
GLUCOSE BLD-MCNC: 219 MG/DL (ref 74–99)
GLUCOSE BLD-MCNC: 320 MG/DL (ref 74–99)
GLUCOSE SERPL-MCNC: 194 MG/DL (ref 74–99)
HCT VFR BLD AUTO: 36 % (ref 34–48)
HGB BLD-MCNC: 11.1 G/DL (ref 11.5–15.5)
IMM GRANULOCYTES # BLD AUTO: <0.03 K/UL (ref 0–0.58)
IMM GRANULOCYTES NFR BLD: 0 % (ref 0–5)
LYMPHOCYTES NFR BLD: 1.57 K/UL (ref 1.5–4)
LYMPHOCYTES RELATIVE PERCENT: 29 % (ref 20–42)
MAGNESIUM SERPL-MCNC: 1.8 MG/DL (ref 1.6–2.6)
MCH RBC QN AUTO: 27 PG (ref 26–35)
MCHC RBC AUTO-ENTMCNC: 30.8 G/DL (ref 32–34.5)
MCV RBC AUTO: 87.6 FL (ref 80–99.9)
MONOCYTES NFR BLD: 0.63 K/UL (ref 0.1–0.95)
MONOCYTES NFR BLD: 12 % (ref 2–12)
NEUTROPHILS NFR BLD: 56 % (ref 43–80)
NEUTS SEG NFR BLD: 3.04 K/UL (ref 1.8–7.3)
PHOSPHATE SERPL-MCNC: 4.1 MG/DL (ref 2.5–4.5)
PLATELET # BLD AUTO: 377 K/UL (ref 130–450)
PMV BLD AUTO: 9.3 FL (ref 7–12)
POTASSIUM SERPL-SCNC: 4.7 MMOL/L (ref 3.5–5)
PROT SERPL-MCNC: 6.7 G/DL (ref 6.4–8.3)
RBC # BLD AUTO: 4.11 M/UL (ref 3.5–5.5)
SODIUM SERPL-SCNC: 135 MMOL/L (ref 132–146)
TME LAST DOSE: NORMAL H
VANCOMYCIN DOSE: NORMAL MG
VANCOMYCIN SERPL-MCNC: 12.2 UG/ML (ref 5–40)
WBC OTHER # BLD: 5.5 K/UL (ref 4.5–11.5)

## 2023-10-26 PROCEDURE — 3700000001 HC ADD 15 MINUTES (ANESTHESIA): Performed by: ANESTHESIOLOGY

## 2023-10-26 PROCEDURE — 6360000002 HC RX W HCPCS: Performed by: INTERNAL MEDICINE

## 2023-10-26 PROCEDURE — 6370000000 HC RX 637 (ALT 250 FOR IP): Performed by: INTERNAL MEDICINE

## 2023-10-26 PROCEDURE — 7100000000 HC PACU RECOVERY - FIRST 15 MIN: Performed by: ANESTHESIOLOGY

## 2023-10-26 PROCEDURE — 92960 CARDIOVERSION ELECTRIC EXT: CPT | Performed by: INTERNAL MEDICINE

## 2023-10-26 PROCEDURE — 93320 DOPPLER ECHO COMPLETE: CPT

## 2023-10-26 PROCEDURE — 2580000003 HC RX 258: Performed by: INTERNAL MEDICINE

## 2023-10-26 PROCEDURE — 2500000003 HC RX 250 WO HCPCS

## 2023-10-26 PROCEDURE — 2060000000 HC ICU INTERMEDIATE R&B

## 2023-10-26 PROCEDURE — 93005 ELECTROCARDIOGRAM TRACING: CPT | Performed by: PHYSICIAN ASSISTANT

## 2023-10-26 PROCEDURE — 93010 ELECTROCARDIOGRAM REPORT: CPT | Performed by: INTERNAL MEDICINE

## 2023-10-26 PROCEDURE — 82962 GLUCOSE BLOOD TEST: CPT

## 2023-10-26 PROCEDURE — 80202 ASSAY OF VANCOMYCIN: CPT

## 2023-10-26 PROCEDURE — 94640 AIRWAY INHALATION TREATMENT: CPT

## 2023-10-26 PROCEDURE — 84100 ASSAY OF PHOSPHORUS: CPT

## 2023-10-26 PROCEDURE — 93325 DOPPLER ECHO COLOR FLOW MAPG: CPT

## 2023-10-26 PROCEDURE — 85025 COMPLETE CBC W/AUTO DIFF WBC: CPT

## 2023-10-26 PROCEDURE — 36415 COLL VENOUS BLD VENIPUNCTURE: CPT

## 2023-10-26 PROCEDURE — 2700000000 HC OXYGEN THERAPY PER DAY

## 2023-10-26 PROCEDURE — 92960 CARDIOVERSION ELECTRIC EXT: CPT | Performed by: ANESTHESIOLOGY

## 2023-10-26 PROCEDURE — 93312 ECHO TRANSESOPHAGEAL: CPT | Performed by: INTERNAL MEDICINE

## 2023-10-26 PROCEDURE — 82330 ASSAY OF CALCIUM: CPT

## 2023-10-26 PROCEDURE — 93312 ECHO TRANSESOPHAGEAL: CPT

## 2023-10-26 PROCEDURE — 80053 COMPREHEN METABOLIC PANEL: CPT

## 2023-10-26 PROCEDURE — 7100000001 HC PACU RECOVERY - ADDTL 15 MIN: Performed by: ANESTHESIOLOGY

## 2023-10-26 PROCEDURE — 3700000000 HC ANESTHESIA ATTENDED CARE: Performed by: ANESTHESIOLOGY

## 2023-10-26 PROCEDURE — 93325 DOPPLER ECHO COLOR FLOW MAPG: CPT | Performed by: INTERNAL MEDICINE

## 2023-10-26 PROCEDURE — 99233 SBSQ HOSP IP/OBS HIGH 50: CPT | Performed by: INTERNAL MEDICINE

## 2023-10-26 PROCEDURE — 83735 ASSAY OF MAGNESIUM: CPT

## 2023-10-26 PROCEDURE — 93320 DOPPLER ECHO COMPLETE: CPT | Performed by: INTERNAL MEDICINE

## 2023-10-26 PROCEDURE — 2580000003 HC RX 258: Performed by: NURSE PRACTITIONER

## 2023-10-26 PROCEDURE — 6360000002 HC RX W HCPCS: Performed by: NURSE PRACTITIONER

## 2023-10-26 PROCEDURE — 2580000003 HC RX 258

## 2023-10-26 RX ORDER — SODIUM CHLORIDE 9 MG/ML
INJECTION, SOLUTION INTRAVENOUS CONTINUOUS PRN
Status: DISCONTINUED | OUTPATIENT
Start: 2023-10-26 | End: 2023-10-26 | Stop reason: SDUPTHER

## 2023-10-26 RX ORDER — ETOMIDATE 2 MG/ML
INJECTION INTRAVENOUS PRN
Status: DISCONTINUED | OUTPATIENT
Start: 2023-10-26 | End: 2023-10-26 | Stop reason: SDUPTHER

## 2023-10-26 RX ADMIN — BUDESONIDE 250 MCG: 0.25 SUSPENSION RESPIRATORY (INHALATION) at 06:39

## 2023-10-26 RX ADMIN — INSULIN LISPRO 4 UNITS: 100 INJECTION, SOLUTION INTRAVENOUS; SUBCUTANEOUS at 22:13

## 2023-10-26 RX ADMIN — BUDESONIDE 250 MCG: 0.25 SUSPENSION RESPIRATORY (INHALATION) at 17:23

## 2023-10-26 RX ADMIN — SODIUM CHLORIDE: 900 INJECTION, SOLUTION INTRAVENOUS at 13:54

## 2023-10-26 RX ADMIN — FLUTICASONE PROPIONATE 1 SPRAY: 50 SPRAY, METERED NASAL at 08:40

## 2023-10-26 RX ADMIN — CEFEPIME 2000 MG: 2 INJECTION, POWDER, FOR SOLUTION INTRAVENOUS at 12:53

## 2023-10-26 RX ADMIN — DULOXETINE HYDROCHLORIDE 60 MG: 60 CAPSULE, DELAYED RELEASE ORAL at 08:39

## 2023-10-26 RX ADMIN — BUSPIRONE HYDROCHLORIDE 5 MG: 5 TABLET ORAL at 08:39

## 2023-10-26 RX ADMIN — VANCOMYCIN HYDROCHLORIDE 1000 MG: 1 INJECTION, POWDER, LYOPHILIZED, FOR SOLUTION INTRAVENOUS at 19:39

## 2023-10-26 RX ADMIN — PANTOPRAZOLE SODIUM 40 MG: 40 TABLET, DELAYED RELEASE ORAL at 05:47

## 2023-10-26 RX ADMIN — APIXABAN 5 MG: 5 TABLET, FILM COATED ORAL at 22:07

## 2023-10-26 RX ADMIN — SODIUM CHLORIDE, PRESERVATIVE FREE 10 ML: 5 INJECTION INTRAVENOUS at 08:40

## 2023-10-26 RX ADMIN — INSULIN GLARGINE 30 UNITS: 100 INJECTION, SOLUTION SUBCUTANEOUS at 22:07

## 2023-10-26 RX ADMIN — MONTELUKAST 10 MG: 10 TABLET, FILM COATED ORAL at 22:07

## 2023-10-26 RX ADMIN — BUSPIRONE HYDROCHLORIDE 5 MG: 5 TABLET ORAL at 22:07

## 2023-10-26 RX ADMIN — GABAPENTIN 300 MG: 300 CAPSULE ORAL at 22:07

## 2023-10-26 RX ADMIN — CLOPIDOGREL BISULFATE 75 MG: 75 TABLET ORAL at 08:38

## 2023-10-26 RX ADMIN — ETOMIDATE 20 MG: 2 INJECTION, SOLUTION INTRAVENOUS at 14:08

## 2023-10-26 RX ADMIN — POTASSIUM CHLORIDE 20 MEQ: 1500 TABLET, EXTENDED RELEASE ORAL at 08:39

## 2023-10-26 RX ADMIN — FUROSEMIDE 40 MG: 10 INJECTION, SOLUTION INTRAMUSCULAR; INTRAVENOUS at 17:48

## 2023-10-26 RX ADMIN — SODIUM CHLORIDE, PRESERVATIVE FREE 10 ML: 5 INJECTION INTRAVENOUS at 22:08

## 2023-10-26 RX ADMIN — ARFORMOTEROL TARTRATE 15 MCG: 15 SOLUTION RESPIRATORY (INHALATION) at 17:23

## 2023-10-26 RX ADMIN — METOPROLOL SUCCINATE 25 MG: 25 TABLET, EXTENDED RELEASE ORAL at 08:39

## 2023-10-26 RX ADMIN — FUROSEMIDE 40 MG: 10 INJECTION, SOLUTION INTRAMUSCULAR; INTRAVENOUS at 08:40

## 2023-10-26 RX ADMIN — TRAZODONE HYDROCHLORIDE 50 MG: 50 TABLET ORAL at 22:07

## 2023-10-26 RX ADMIN — ETOMIDATE 20 MG: 2 INJECTION, SOLUTION INTRAVENOUS at 14:12

## 2023-10-26 RX ADMIN — ARFORMOTEROL TARTRATE 15 MCG: 15 SOLUTION RESPIRATORY (INHALATION) at 06:39

## 2023-10-26 RX ADMIN — ATORVASTATIN CALCIUM 40 MG: 40 TABLET, FILM COATED ORAL at 22:07

## 2023-10-26 RX ADMIN — APIXABAN 5 MG: 5 TABLET, FILM COATED ORAL at 08:39

## 2023-10-26 ASSESSMENT — LIFESTYLE VARIABLES: SMOKING_STATUS: 1

## 2023-10-26 ASSESSMENT — COPD QUESTIONNAIRES: CAT_SEVERITY: SEVERE

## 2023-10-26 ASSESSMENT — PAIN SCALES - GENERAL
PAINLEVEL_OUTOF10: 0

## 2023-10-26 ASSESSMENT — ENCOUNTER SYMPTOMS: SHORTNESS OF BREATH: 1

## 2023-10-26 NOTE — ANESTHESIA POSTPROCEDURE EVALUATION
Department of Anesthesiology  Postprocedure Note    Patient: Garnet Primrose  MRN: [id number]  YOB: 1965  Date of evaluation: 10/26/2023      Procedure Summary     Date: 10/26/23 Room / Location: [address] [place] [address] PACU; [facility]    Anesthesia Start: 1354 Anesthesia Stop: 7886    Procedure: SJWZ ALEXANDRA CARDIOVERSION Diagnosis:     Scheduled Providers:  Responsible Provider: Eulalio Nunez MD    Anesthesia Type: MAC ASA Status: 4          Anesthesia Type: MAC    Lizzie Phase I: Lizzie Score: 9    Lizzie Phase II:        Anesthesia Post Evaluation    Patient location during evaluation: PACU  Patient participation: complete - patient participated  Level of consciousness: awake  Airway patency: patent  Nausea & Vomiting: no nausea and no vomiting  Complications: no  Cardiovascular status: hemodynamically stable  Respiratory status: acceptable  Hydration status: euvolemic  Pain management: adequate

## 2023-10-26 NOTE — PLAN OF CARE
Problem: Discharge Planning  Goal: Discharge to home or other facility with appropriate resources  Outcome: Progressing  Flowsheets  Taken 10/26/2023 0748 by Raman Dominguez RN  Discharge to home or other facility with appropriate resources:   Identify barriers to discharge with patient and caregiver   Arrange for needed discharge resources and transportation as appropriate   Identify discharge learning needs (meds, wound care, etc)   Refer to discharge planning if patient needs post-hospital services based on physician order or complex needs related to functional status, cognitive ability or social support system  Taken 10/25/2023 2350 by Hazel Hunter RN  Discharge to home or other facility with appropriate resources:   Arrange for needed discharge resources and transportation as appropriate   Identify barriers to discharge with patient and caregiver   Identify discharge learning needs (meds, wound care, etc)   Refer to discharge planning if patient needs post-hospital services based on physician order or complex needs related to functional status, cognitive ability or social support system     Problem: Skin/Tissue Integrity  Goal: Absence of new skin breakdown  Description: 1. Monitor for areas of redness and/or skin breakdown  2. Assess vascular access sites hourly  3. Every 4-6 hours minimum:  Change oxygen saturation probe site  4. Every 4-6 hours:  If on nasal continuous positive airway pressure, respiratory therapy assess nares and determine need for appliance change or resting period.   Outcome: Progressing     Problem: Safety - Adult  Goal: Free from fall injury  Outcome: Progressing     Problem: ABCDS Injury Assessment  Goal: Absence of physical injury  Outcome: Progressing     Problem: Nutrition Deficit:  Goal: Optimize nutritional status  Outcome: Progressing     Problem: Chronic Conditions and Co-morbidities  Goal: Patient's chronic conditions and co-morbidity symptoms are monitored and

## 2023-10-26 NOTE — CARE COORDINATION
10/26/2023 1310 CM Note:  Pt plan is to return home as prior. Pt is independent and drives. Pt has a Cpap through [facility], she has been weaned to room air. If pt's needs IV antibiotics at d/c, she has no preference in [facility] and has a friend that can learn to administer antibiotics. Pt also has been to the [facility] in the past. Pt is for a ALEXANDRA with cardioversion today. CM will follow for possible antibiotics. Pt has transportation home.  Electronically signed by Jerzy Montana RN on 10/26/2023 at 1:28 PM

## 2023-10-26 NOTE — OP NOTE
Operative Note      Patient: Rashida Lema  YOB: 1965  MRN: [id number]    Date of Procedure: 10/26/2023    Cardioversion: Consent for operation or procedure: Risks and benefits discussed with patient and consent obtained    Diagnosis: Atrial fibrillation. EBL: 0 ml    The appropriate time-out procedure was performed including proper identification of the patient, physician, procedure, documentation, and there were no safety issues identified. The patient participated actively in this. After sedation was achieved, the patient  was placed in the supine position and hands free patches were placed on his chest in the AP position. ALEXANDRA performed. Full report provided separately. Patient tolerated procedure well. 1 shock was provided at, 200 Joules with successful restoration of normal sinus rhythm. Repeat EKG confirms return to sinus rhythm. Complications: The patient tolerated the procedure well without complications.     Edouard Torres MD  Cardiologist  Cardiology, [facility]) Physicians    Electronically signed by Edouard Torres MD on 10/26/2023 at 4:01 PM

## 2023-10-26 NOTE — ANESTHESIA PRE PROCEDURE
Department of Anesthesiology  Preprocedure Note       Name:  Jason Atkins   Age:  60 y.o.  :  1965                                          MRN:  [id number]         Date:  10/26/2023      Surgeon: DR Moraima Miranda    Procedure: CARDIOVERSION/ALEXANDRA    Medications prior to admission:   Prior to Admission medications    Medication Sig Start Date End Date Taking?  Authorizing Provider   montelukast (SINGULAIR) 10 MG tablet Take 1 tablet by mouth nightly    Konstantin Ingram MD   ferrous sulfate (IRON 325) 325 (65 Fe) MG tablet Take 1 tablet by mouth daily (with breakfast)    Konstantin Ingram MD   metoprolol succinate (TOPROL XL) 25 MG extended release tablet Take 1 tablet by mouth daily 23   Donna Agosto MD   BREZTRI AEROSPHERE 160-9-4.8 MCG/ACT AERO INHALE 2 PUFFS BY MOUTH TWICE DAILY 23   Konstantin Ingram MD   DULoxetine (CYMBALTA) 60 MG extended release capsule TAKE 1 CAPSULE BY MOUTH EVERY DAY 22   Konstantin Ingram MD   LEVEMIR FLEXTOUCH 100 UNIT/ML injection pen INJECT 56 UNITS UNDER THE SKIN EVERY MORNING AND 46 UNITS EVERY NIGHT AT BEDTIME 2/10/23   Konstantin Ingram MD   furosemide (LASIX) 20 MG tablet TAKE 1 TABLET BY MOUTH DAILY 22   Donna Agosto MD   ENTRESTO 24-26 MG per tablet TAKE HALF A TABLET BY MOUTH TWICE DAILY 22   Donna Agosto MD   busPIRone (BUSPAR) 5 MG tablet Take 1 tablet by mouth 2 times daily    Konstantin Ingram MD   BIOTIN PO Take by mouth daily    Konstantin Ingram MD   Cyanocobalamin (VITAMIN B 12 PO) Take by mouth daily    Konstantin Ingram MD   TURMERIC PO Take by mouth daily    Konstantin Ingram MD   vitamin C (ASCORBIC ACID) 500 MG tablet Take 2 tablets by mouth daily    Konstantin Ingram MD   CINNAMON PO Take by mouth daily    Konstantin Ingram MD   Cholecalciferol (VITAMIN D) 125 MCG (5000 UT) CAPS Take by mouth daily    Konstantin Ingram MD   aspirin 81 MG EC tablet Take by mouth Last dose  4/10

## 2023-10-26 NOTE — PROGRESS NOTES
Pt. Arrived to PACU at 1335. The monitor and crash cart were connected to the patient. Fluids were hung, suction supplies was checked and an oxygen mask with end tidal was applied. All staff including Dr. Olivier Arriaga, anesthesia and echo tech were present during the timeout at 1407.     1 shock was delivered at [phone] with 200J. Pt. Converted to sinus tachycardia. An EKG was obtained to confirm new rhythm. Pt. Was stable during the procedure and stable during recovery. Report called to the floor RN and pt. Transported back to room.

## 2023-10-27 ENCOUNTER — TELEPHONE (OUTPATIENT)
Dept: CARDIOLOGY CLINIC | Age: 58
End: 2023-10-27

## 2023-10-27 PROBLEM — Z59.48 LACK OF ADEQUATE FOOD: Status: ACTIVE | Noted: 2023-10-27

## 2023-10-27 LAB
ALBUMIN SERPL-MCNC: 3.3 G/DL (ref 3.5–5.2)
ALP SERPL-CCNC: 92 U/L (ref 35–104)
ALT SERPL-CCNC: 7 U/L (ref 0–32)
ANION GAP SERPL CALCULATED.3IONS-SCNC: 11 MMOL/L (ref 7–16)
AST SERPL-CCNC: 12 U/L (ref 0–31)
BASOPHILS # BLD: 0.04 K/UL (ref 0–0.2)
BASOPHILS NFR BLD: 1 % (ref 0–2)
BILIRUB SERPL-MCNC: 0.4 MG/DL (ref 0–1.2)
BUN SERPL-MCNC: 53 MG/DL (ref 6–20)
CALCIUM SERPL-MCNC: 9.2 MG/DL (ref 8.6–10.2)
CHLORIDE SERPL-SCNC: 99 MMOL/L (ref 98–107)
CO2 SERPL-SCNC: 26 MMOL/L (ref 22–29)
CREAT SERPL-MCNC: 2.3 MG/DL (ref 0.5–1)
DATE LAST DOSE: NORMAL
EOSINOPHIL # BLD: 0.15 K/UL (ref 0.05–0.5)
EOSINOPHILS RELATIVE PERCENT: 3 % (ref 0–6)
ERYTHROCYTE [DISTWIDTH] IN BLOOD BY AUTOMATED COUNT: 15.2 % (ref 11.5–15)
GFR SERPL CREATININE-BSD FRML MDRD: 24 ML/MIN/1.73M2
GLUCOSE BLD-MCNC: 187 MG/DL (ref 74–99)
GLUCOSE BLD-MCNC: 217 MG/DL (ref 74–99)
GLUCOSE BLD-MCNC: 221 MG/DL (ref 74–99)
GLUCOSE BLD-MCNC: 225 MG/DL (ref 74–99)
GLUCOSE SERPL-MCNC: 223 MG/DL (ref 74–99)
HCT VFR BLD AUTO: 35.1 % (ref 34–48)
HGB BLD-MCNC: 10.8 G/DL (ref 11.5–15.5)
IMM GRANULOCYTES # BLD AUTO: <0.03 K/UL (ref 0–0.58)
IMM GRANULOCYTES NFR BLD: 0 % (ref 0–5)
LYMPHOCYTES NFR BLD: 1.59 K/UL (ref 1.5–4)
LYMPHOCYTES RELATIVE PERCENT: 31 % (ref 20–42)
MCH RBC QN AUTO: 26.9 PG (ref 26–35)
MCHC RBC AUTO-ENTMCNC: 30.8 G/DL (ref 32–34.5)
MCV RBC AUTO: 87.3 FL (ref 80–99.9)
MONOCYTES NFR BLD: 0.67 K/UL (ref 0.1–0.95)
MONOCYTES NFR BLD: 13 % (ref 2–12)
NEUTROPHILS NFR BLD: 52 % (ref 43–80)
NEUTS SEG NFR BLD: 2.66 K/UL (ref 1.8–7.3)
PLATELET # BLD AUTO: 365 K/UL (ref 130–450)
PMV BLD AUTO: 9.6 FL (ref 7–12)
POTASSIUM SERPL-SCNC: 4.8 MMOL/L (ref 3.5–5)
PROT SERPL-MCNC: 6.6 G/DL (ref 6.4–8.3)
RBC # BLD AUTO: 4.02 M/UL (ref 3.5–5.5)
SODIUM SERPL-SCNC: 136 MMOL/L (ref 132–146)
TME LAST DOSE: NORMAL H
VANCOMYCIN DOSE: NORMAL MG
VANCOMYCIN SERPL-MCNC: 13.7 UG/ML (ref 5–40)
WBC OTHER # BLD: 5.1 K/UL (ref 4.5–11.5)

## 2023-10-27 PROCEDURE — 6360000002 HC RX W HCPCS: Performed by: NURSE PRACTITIONER

## 2023-10-27 PROCEDURE — 6360000002 HC RX W HCPCS: Performed by: INTERNAL MEDICINE

## 2023-10-27 PROCEDURE — 6370000000 HC RX 637 (ALT 250 FOR IP): Performed by: INTERNAL MEDICINE

## 2023-10-27 PROCEDURE — 2580000003 HC RX 258: Performed by: INTERNAL MEDICINE

## 2023-10-27 PROCEDURE — 94761 N-INVAS EAR/PLS OXIMETRY MLT: CPT

## 2023-10-27 PROCEDURE — 94640 AIRWAY INHALATION TREATMENT: CPT

## 2023-10-27 PROCEDURE — 82962 GLUCOSE BLOOD TEST: CPT

## 2023-10-27 PROCEDURE — 2580000003 HC RX 258: Performed by: NURSE PRACTITIONER

## 2023-10-27 PROCEDURE — 99233 SBSQ HOSP IP/OBS HIGH 50: CPT | Performed by: INTERNAL MEDICINE

## 2023-10-27 PROCEDURE — 85025 COMPLETE CBC W/AUTO DIFF WBC: CPT

## 2023-10-27 PROCEDURE — 2060000000 HC ICU INTERMEDIATE R&B

## 2023-10-27 PROCEDURE — 36415 COLL VENOUS BLD VENIPUNCTURE: CPT

## 2023-10-27 PROCEDURE — 80053 COMPREHEN METABOLIC PANEL: CPT

## 2023-10-27 PROCEDURE — 2700000000 HC OXYGEN THERAPY PER DAY

## 2023-10-27 PROCEDURE — 80202 ASSAY OF VANCOMYCIN: CPT

## 2023-10-27 RX ADMIN — FUROSEMIDE 40 MG: 10 INJECTION, SOLUTION INTRAMUSCULAR; INTRAVENOUS at 16:33

## 2023-10-27 RX ADMIN — MONTELUKAST 10 MG: 10 TABLET, FILM COATED ORAL at 21:39

## 2023-10-27 RX ADMIN — ACETAMINOPHEN 650 MG: 325 TABLET ORAL at 16:36

## 2023-10-27 RX ADMIN — FLUTICASONE PROPIONATE 1 SPRAY: 50 SPRAY, METERED NASAL at 07:59

## 2023-10-27 RX ADMIN — APIXABAN 5 MG: 5 TABLET, FILM COATED ORAL at 21:39

## 2023-10-27 RX ADMIN — FUROSEMIDE 40 MG: 10 INJECTION, SOLUTION INTRAMUSCULAR; INTRAVENOUS at 07:59

## 2023-10-27 RX ADMIN — INSULIN GLARGINE 30 UNITS: 100 INJECTION, SOLUTION SUBCUTANEOUS at 08:07

## 2023-10-27 RX ADMIN — GABAPENTIN 300 MG: 300 CAPSULE ORAL at 21:39

## 2023-10-27 RX ADMIN — BUSPIRONE HYDROCHLORIDE 5 MG: 5 TABLET ORAL at 07:58

## 2023-10-27 RX ADMIN — BUDESONIDE 250 MCG: 0.25 SUSPENSION RESPIRATORY (INHALATION) at 18:18

## 2023-10-27 RX ADMIN — TRAZODONE HYDROCHLORIDE 50 MG: 50 TABLET ORAL at 21:39

## 2023-10-27 RX ADMIN — CLOPIDOGREL BISULFATE 75 MG: 75 TABLET ORAL at 08:06

## 2023-10-27 RX ADMIN — INSULIN GLARGINE 30 UNITS: 100 INJECTION, SOLUTION SUBCUTANEOUS at 21:45

## 2023-10-27 RX ADMIN — ACETAMINOPHEN 650 MG: 325 TABLET ORAL at 08:06

## 2023-10-27 RX ADMIN — SODIUM CHLORIDE, PRESERVATIVE FREE 10 ML: 5 INJECTION INTRAVENOUS at 07:59

## 2023-10-27 RX ADMIN — ATORVASTATIN CALCIUM 40 MG: 40 TABLET, FILM COATED ORAL at 21:39

## 2023-10-27 RX ADMIN — DULOXETINE HYDROCHLORIDE 60 MG: 60 CAPSULE, DELAYED RELEASE ORAL at 07:58

## 2023-10-27 RX ADMIN — ARFORMOTEROL TARTRATE 15 MCG: 15 SOLUTION RESPIRATORY (INHALATION) at 06:04

## 2023-10-27 RX ADMIN — INSULIN LISPRO 2 UNITS: 100 INJECTION, SOLUTION INTRAVENOUS; SUBCUTANEOUS at 12:00

## 2023-10-27 RX ADMIN — APIXABAN 5 MG: 5 TABLET, FILM COATED ORAL at 07:58

## 2023-10-27 RX ADMIN — POTASSIUM CHLORIDE 20 MEQ: 1500 TABLET, EXTENDED RELEASE ORAL at 07:58

## 2023-10-27 RX ADMIN — VANCOMYCIN HYDROCHLORIDE 1000 MG: 1 INJECTION, POWDER, LYOPHILIZED, FOR SOLUTION INTRAVENOUS at 18:51

## 2023-10-27 RX ADMIN — SODIUM CHLORIDE, PRESERVATIVE FREE 10 ML: 5 INJECTION INTRAVENOUS at 21:38

## 2023-10-27 RX ADMIN — BUDESONIDE 250 MCG: 0.25 SUSPENSION RESPIRATORY (INHALATION) at 06:04

## 2023-10-27 RX ADMIN — BUSPIRONE HYDROCHLORIDE 5 MG: 5 TABLET ORAL at 21:39

## 2023-10-27 RX ADMIN — METOPROLOL SUCCINATE 25 MG: 25 TABLET, EXTENDED RELEASE ORAL at 07:58

## 2023-10-27 RX ADMIN — CEFEPIME 2000 MG: 2 INJECTION, POWDER, FOR SOLUTION INTRAVENOUS at 12:00

## 2023-10-27 RX ADMIN — INSULIN LISPRO 2 UNITS: 100 INJECTION, SOLUTION INTRAVENOUS; SUBCUTANEOUS at 08:07

## 2023-10-27 RX ADMIN — ARFORMOTEROL TARTRATE 15 MCG: 15 SOLUTION RESPIRATORY (INHALATION) at 18:17

## 2023-10-27 RX ADMIN — PANTOPRAZOLE SODIUM 40 MG: 40 TABLET, DELAYED RELEASE ORAL at 05:58

## 2023-10-27 RX ADMIN — SODIUM CHLORIDE, PRESERVATIVE FREE 10 ML: 5 INJECTION INTRAVENOUS at 08:00

## 2023-10-27 ASSESSMENT — PAIN SCALES - GENERAL: PAINLEVEL_OUTOF10: 0

## 2023-10-27 NOTE — CONSULTS
[address] [facility]   Inpatient CHF Nurse Navigator Consult      Cardiologist: Dr. Breana Rojas is a 60 y.o. (1965) female with a history of HFrEF, most recent EF: EF 15+/- 5%   Lab Results   Component Value Date    LVEF 35 06/11/2021       Patient was awake and alert, laying in bed during the consultation and is agreeable to heart failure education. She was engaged and asked appropriate questions throughout the education session. Barriers identified during consult contributing to HF Hospitalization:  [] Limited medication adherence   [] Poor health literacy, education regarding HF medications provided   [] Pill box provided to patient  [] Difficulty affording medications  [] Prescription assistance information given     [x] Not weighing themselves daily -discussed importance of daily wts  [x] Weight log provided for easy monitoring  [] Scale provided     [x] Not following low sodium diet  [] Food insecurity   [x] 2 gram sodium diet education provided   [] Low sodium recipes provided  [] Sodium free seasoning provided   [] Low sodium meal delivery options given to patient  [x] Dietician consulted     [] Lack of transportation to appointments     [] Depression, given chronic illness  [] Primary team notified     [] Goals of care need addressed  [] Palliative care consulted     [x] CHF CHW consulted, to assist with low sodium meal delivery/ access to fresh foods    Chart Reviewed:  Diet: ADULT ORAL NUTRITION SUPPLEMENT; Lunch, Dinner; Wound Healing Oral Supplement  ADULT DIET; Regular; 4 carb choices (60 gm/meal);  Low Sodium (2 gm)   Daily Weights: Patient Vitals for the past 96 hrs (Last 3 readings):   Weight   10/27/23 0631 124.8 kg (275 lb 2.2 oz)   10/24/23 0630 123.8 kg (273 lb)   10/23/23 2345 124.3 kg (274 lb)     I/O:   Intake/Output Summary (Last 24 hours) at 10/27/2023 1555  Last data filed at 10/27/2023 1130  Gross per 24 hour   Intake 687.21 ml   Output --
Comprehensive Nutrition Assessment    Type and Reason for Visit:  Initial, Consult    Nutrition Recommendations/Plan:   Continue 4 carb choice diet  Niles BID wound healing     Malnutrition Assessment:  Malnutrition Status: At risk for malnutrition (Comment) (10/24/23 1515)    Context:  Chronic Illness     Findings of the 6 clinical characteristics of malnutrition:  Energy Intake:  Mild decrease in energy intake (Comment)  Weight Loss:  No significant weight loss     Body Fat Loss:  No significant body fat loss     Muscle Mass Loss:  No significant muscle mass loss    Fluid Accumulation:  Severe (BLLE +4 pitting and weeping) Extremities   Strength:  Not Performed    Nutrition Assessment:    Pt is a 60year old female being treated for Chronic CHF, Cellulitis Left LE, ARF, hypervolemia, and BLLE pre tibial weeping wounds with blisters. PMHX: Anxiety, CAD x 5 Stents, Cerebral Artery Occlusion with rt side weakness, COPD, Dpression, DM, Hyperlipidemia, Mitral and Tricuspid valve Insufficiency, Sleep Apnea (Cpap) Smoker 1 ppd x 35 years. Consult for [place]. Provide Niles BID for wound healing. Continue to monitor while inpatient. Nutrition Related Findings:    Pre-tibial BLLE weeping wounds with blisters, I/O +600 since admit, BUN 45 H, Creatinine 2.1 H, GFR 27 L, Magnesium 1.5 L, Total Protein 6.3 L, Albumin 3.0 L, BLLE Edema +4 Pitting and weeping, ABD WDL, Glucose ranges 45, 60, 270. Wound Type: Multiple (Pre-tibial right and left weeping wounds with blisters)       Current Nutrition Intake & Therapies:    Average Meal Intake: %  Average Supplements Intake: None Ordered  ADULT DIET; Regular; 4 carb choices (60 gm/meal)  ADULT ORAL NUTRITION SUPPLEMENT; Lunch, Dinner; Wound Healing Oral Supplement    Anthropometric Measures:  Height: 170.2 cm (5' 7.01\")  Ideal Body Weight (IBW): 135 lbs (61 kg)       Current Body Weight: 123.8 kg (272 lb 14.9 oz) (10/24/23 [facility]), 202.2 % IBW.  Weight Source: Bed
Nephrology Consult Note  10/25/2023 1:51 PM  Subjective:     Admit Date: 10/23/2023  PCP: Aiden Small MD    Interval History: This is a very pleasant 65-year-old female with past medical history as listed below per Epic electronic health records including chronic kidney disease stage IIIa with longstanding history of albuminuria suggesting diabetic kidney disease. Longstanding history of type 2 diabetes complicated by retinopathy. Recurrent episodes of acute kidney injury. Diffuse atherosclerotic arterial disease complicated by coronary artery disease requiring coronary artery bypass grafting and history of stroke. History of peripheral arterial disease status post bilateral toe amputations. She is a lifelong smoker. Obstructive sleep apnea on home CPAP. History of bowel perforation corrected surgically. Dyslipidemia. Depression. Severe cardiomyopathy with HFrEF    Prior to admission medication list included Lasix and Entresto. Patient was admitted to the hospital with worsening lower extremity edema and shortness of breath. Prior to admission her serum creatinine was 1.2 February this year. On admission she was found to have CARYL 2 days ago creatinine of 2.1. She received IV contrast with CT scan of the lung on admission. Today's creatinine is 2.5 after receiving IV contrast and receiving Lasix and Entresto both of which are currently on hold    Nephrology consultation was requested for acute kidney injury on top of stage IIIa chronic kidney disease suggesting cardiorenal syndrome. When I saw the patient, she was lying in bed comfortably wearing oxygen nasal cannula. She was awake alert and oriented. She was not in any distress. Not short of breath at rest on oxygen. Complaining of lower extremity edema. Lower extremity discomfort. Has dressing on both legs. Able to eat and drink. No fever no chills no headache no lightheadedness no nausea no vomiting. No dysuria.     Diet: ADULT
2016  GERD  Hypoalbuminemia  DNR-CCA code status           RECOMMENDATIONS:  Repeat EKG to reassess tachycardia   IV Lasix 40 mg BID  Strict intake and output, daily weights  Monitor renal function and electrolytes  HFrEF GDMT  Hold Entresto 24/26 mg twice daily in setting of CARYL/hyperkalemia, resume once tolerated  Continue Toprol-XL 25 mg daily  Consider addition of Aldactone if tolerated by renal function, as well as SGLT-2 inhibitor  Continue ASA and statin therapy  ? Need for DAPT  Patient continues to decline any aggressive cardiac testing/evaluation, including ICD  Rest as per primary service and other consultants  Compliance with treatment of KIMBERLEY advised  Tobacco cessation advised  Above as per Dr. Mikcey Trejo -- A+P discussed and made in collaboration with him. Further recommendations to follow            NOTE: This report was transcribed using voice recognition software. Every effort was made to ensure accuracy; however, inadvertent computerized transcription errors may be present.       John Singh, [address] [place] [address],[address] Floor Cardiology    Electronically signed by Gavin Headley PA-C on 10/25/2023 at 8:15 AM

## 2023-10-27 NOTE — TELEPHONE ENCOUNTER
Stan Alonzo, RN  Glenis Cote, [address] [facility]  Loras Nail   1965   Admit 10/23/23   Afib/ ADHF   Needs HFU with Dr. Abbie Yun. Called patient left voicemail.

## 2023-10-27 NOTE — PLAN OF CARE
Patient's chart updated to reflect:        - HF discharge instructions.  -Orders: 2 gram sodium diet, daily weights, I/O.  -PCP and cardiology follow up appointments to be scheduled within 7 days of hospital discharge. -CHF education session will be provided to the patient prior to hospital discharge.     Renu Gutierrez, RN MSN,RN  Heart Failure Navigator

## 2023-10-27 NOTE — CARE COORDINATION
10/27/2023 1410 CM Note:  Pt plan is to return home as prior. Pt is independent and drives. Pt has a Cpap through [facility], she has been weaned to room air. If pt's needs IV antibiotics at d/c, she has no preference in [address] and has a friend that can learn to administer antibiotics. Pt also has been to the [address] [facility] in the past.  CM will follow for possible antibiotics. Pt has transportation home. Electronically signed by Jhoana Galeas RN on 10/27/2023 at 2:21 PM

## 2023-10-27 NOTE — PLAN OF CARE
Problem: Discharge Planning  Goal: Discharge to home or other facility with appropriate resources  Outcome: Progressing     Problem: Skin/Tissue Integrity  Goal: Absence of new skin breakdown  Description: 1. Monitor for areas of redness and/or skin breakdown  2. Assess vascular access sites hourly  3. Every 4-6 hours minimum:  Change oxygen saturation probe site  4. Every 4-6 hours:  If on nasal continuous positive airway pressure, respiratory therapy assess nares and determine need for appliance change or resting period.   Outcome: Progressing     Problem: Safety - Adult  Goal: Free from fall injury  Outcome: Progressing     Problem: ABCDS Injury Assessment  Goal: Absence of physical injury  Outcome: Progressing     Problem: Nutrition Deficit:  Goal: Optimize nutritional status  Outcome: Progressing     Problem: Chronic Conditions and Co-morbidities  Goal: Patient's chronic conditions and co-morbidity symptoms are monitored and maintained or improved  Outcome: Progressing     Problem: Pain  Goal: Verbalizes/displays adequate comfort level or baseline comfort level  Outcome: Progressing  Flowsheets (Taken 10/27/2023 0327 by Corey Sneed RN)  Verbalizes/displays adequate comfort level or baseline comfort level:   Encourage patient to monitor pain and request assistance   Assess pain using appropriate pain scale   Administer analgesics based on type and severity of pain and evaluate response   Implement non-pharmacological measures as appropriate and evaluate response   Notify Licensed Independent Practitioner if interventions unsuccessful or patient reports new pain

## 2023-10-27 NOTE — DISCHARGE INSTRUCTIONS
HEART FAILURE  / CONGESTIVE HEART FAILURE  DISCHARGE INSTRUCTIONS:  GUIDELINES TO FOLLOW AT HOME    Self- Managed Care:     MEDICATIONS:  Take your medication as directed. If you are experiencing any side effects, inform your doctor, Do not stop taking any of your medications without letting your doctor know. Check with your doctor before taking any over-the-counter medications / herbal / or dietary supplements. They may interfere with your other medications. Do not take ibuprofen (Advil or Motrin) and naproxen (Aleve) without talking to your doctor first. They could make your heart failure worse. WEIGHT MONITORING:   Weigh yourself everyday (with the same scale) around the same time of the day and write it down. (you can chart them on a calendar or keep track of them on paper. Notify your doctor of a weight gain of 3 pounds or more in 1 day   OR a total of 5 pounds or more in 1 week    Take your weight record to your doctor visits  Also, the same goes if you loose more than 3# in one day, let your heart doctor know. DIET:   Cardiac heart healthy diet- Low saturated / low trans fat, no added salt, caffeine restricted, Low sodium diet-   No more than 2,000mg (2 grams) of salt / sodium per day (which equals to a little less than  a teaspoon of salt)  If your doctor wants you on a fluid restriction. ..it is usually recommended a fluid limit of 2,000cc -  Fluid restriction- 2,000 ml (milliliters) = 64 ounces = you can have 8 glasses of fluid per day (each glass 8 ounces)    Follow a low salt diet - avoid using salt at the table, avoid / limit use of canned soups, processed / packaged foods, salted snacks, olives and pickles. Do not use a salt substitute without checking with your doctor, they may contain a high amount of potassioum. (Mrs. Vinayak Marquez is safe to use).     Limit the use of alcohol       CALL YOUR DOCTOR THE FIRST DAY YOU NOTICE ANY OF THESE   SYMPTOMS:  You have a

## 2023-10-28 LAB
ALBUMIN SERPL-MCNC: 3.5 G/DL (ref 3.5–5.2)
ALP SERPL-CCNC: 98 U/L (ref 35–104)
ALT SERPL-CCNC: 7 U/L (ref 0–32)
ANION GAP SERPL CALCULATED.3IONS-SCNC: 9 MMOL/L (ref 7–16)
AST SERPL-CCNC: 14 U/L (ref 0–31)
BASOPHILS # BLD: 0.06 K/UL (ref 0–0.2)
BASOPHILS NFR BLD: 1 % (ref 0–2)
BILIRUB SERPL-MCNC: 0.5 MG/DL (ref 0–1.2)
BUN SERPL-MCNC: 55 MG/DL (ref 6–20)
CALCIUM SERPL-MCNC: 9.3 MG/DL (ref 8.6–10.2)
CHLORIDE SERPL-SCNC: 99 MMOL/L (ref 98–107)
CO2 SERPL-SCNC: 28 MMOL/L (ref 22–29)
CREAT SERPL-MCNC: 1.8 MG/DL (ref 0.5–1)
DATE LAST DOSE: NORMAL
EOSINOPHIL # BLD: 0.2 K/UL (ref 0.05–0.5)
EOSINOPHILS RELATIVE PERCENT: 4 % (ref 0–6)
ERYTHROCYTE [DISTWIDTH] IN BLOOD BY AUTOMATED COUNT: 15 % (ref 11.5–15)
GFR SERPL CREATININE-BSD FRML MDRD: 32 ML/MIN/1.73M2
GLUCOSE BLD-MCNC: 151 MG/DL (ref 74–99)
GLUCOSE BLD-MCNC: 159 MG/DL (ref 74–99)
GLUCOSE BLD-MCNC: 243 MG/DL (ref 74–99)
GLUCOSE BLD-MCNC: 308 MG/DL (ref 74–99)
GLUCOSE SERPL-MCNC: 153 MG/DL (ref 74–99)
HCT VFR BLD AUTO: 37.3 % (ref 34–48)
HGB BLD-MCNC: 11.5 G/DL (ref 11.5–15.5)
IMM GRANULOCYTES # BLD AUTO: <0.03 K/UL (ref 0–0.58)
IMM GRANULOCYTES NFR BLD: 0 % (ref 0–5)
LYMPHOCYTES NFR BLD: 1.48 K/UL (ref 1.5–4)
LYMPHOCYTES RELATIVE PERCENT: 27 % (ref 20–42)
MAGNESIUM SERPL-MCNC: 1.7 MG/DL (ref 1.6–2.6)
MCH RBC QN AUTO: 26.6 PG (ref 26–35)
MCHC RBC AUTO-ENTMCNC: 30.8 G/DL (ref 32–34.5)
MCV RBC AUTO: 86.3 FL (ref 80–99.9)
MONOCYTES NFR BLD: 0.58 K/UL (ref 0.1–0.95)
MONOCYTES NFR BLD: 11 % (ref 2–12)
NEUTROPHILS NFR BLD: 58 % (ref 43–80)
NEUTS SEG NFR BLD: 3.19 K/UL (ref 1.8–7.3)
PHOSPHATE SERPL-MCNC: 3 MG/DL (ref 2.5–4.5)
PLATELET # BLD AUTO: 358 K/UL (ref 130–450)
PMV BLD AUTO: 9.5 FL (ref 7–12)
POTASSIUM SERPL-SCNC: 4.8 MMOL/L (ref 3.5–5)
PROT SERPL-MCNC: 7 G/DL (ref 6.4–8.3)
RBC # BLD AUTO: 4.32 M/UL (ref 3.5–5.5)
SODIUM SERPL-SCNC: 136 MMOL/L (ref 132–146)
TME LAST DOSE: NORMAL H
VANCOMYCIN DOSE: NORMAL MG
VANCOMYCIN SERPL-MCNC: 16.5 UG/ML (ref 5–40)
WBC OTHER # BLD: 5.5 K/UL (ref 4.5–11.5)

## 2023-10-28 PROCEDURE — 2580000003 HC RX 258: Performed by: NURSE PRACTITIONER

## 2023-10-28 PROCEDURE — 2060000000 HC ICU INTERMEDIATE R&B

## 2023-10-28 PROCEDURE — 6360000002 HC RX W HCPCS: Performed by: INTERNAL MEDICINE

## 2023-10-28 PROCEDURE — 36415 COLL VENOUS BLD VENIPUNCTURE: CPT

## 2023-10-28 PROCEDURE — 2700000000 HC OXYGEN THERAPY PER DAY

## 2023-10-28 PROCEDURE — 85025 COMPLETE CBC W/AUTO DIFF WBC: CPT

## 2023-10-28 PROCEDURE — 2580000003 HC RX 258: Performed by: INTERNAL MEDICINE

## 2023-10-28 PROCEDURE — 6370000000 HC RX 637 (ALT 250 FOR IP): Performed by: INTERNAL MEDICINE

## 2023-10-28 PROCEDURE — 99233 SBSQ HOSP IP/OBS HIGH 50: CPT | Performed by: INTERNAL MEDICINE

## 2023-10-28 PROCEDURE — 80053 COMPREHEN METABOLIC PANEL: CPT

## 2023-10-28 PROCEDURE — 84100 ASSAY OF PHOSPHORUS: CPT

## 2023-10-28 PROCEDURE — 94640 AIRWAY INHALATION TREATMENT: CPT

## 2023-10-28 PROCEDURE — 82962 GLUCOSE BLOOD TEST: CPT

## 2023-10-28 PROCEDURE — 6360000002 HC RX W HCPCS: Performed by: NURSE PRACTITIONER

## 2023-10-28 PROCEDURE — 80202 ASSAY OF VANCOMYCIN: CPT

## 2023-10-28 PROCEDURE — 94761 N-INVAS EAR/PLS OXIMETRY MLT: CPT

## 2023-10-28 PROCEDURE — 83735 ASSAY OF MAGNESIUM: CPT

## 2023-10-28 RX ORDER — METOLAZONE 2.5 MG/1
5 TABLET ORAL ONCE
Status: COMPLETED | OUTPATIENT
Start: 2023-10-28 | End: 2023-10-28

## 2023-10-28 RX ADMIN — DULOXETINE HYDROCHLORIDE 60 MG: 60 CAPSULE, DELAYED RELEASE ORAL at 09:01

## 2023-10-28 RX ADMIN — FUROSEMIDE 40 MG: 10 INJECTION, SOLUTION INTRAMUSCULAR; INTRAVENOUS at 08:59

## 2023-10-28 RX ADMIN — BUSPIRONE HYDROCHLORIDE 5 MG: 5 TABLET ORAL at 20:50

## 2023-10-28 RX ADMIN — CLOPIDOGREL BISULFATE 75 MG: 75 TABLET ORAL at 11:34

## 2023-10-28 RX ADMIN — INSULIN GLARGINE 30 UNITS: 100 INJECTION, SOLUTION SUBCUTANEOUS at 20:54

## 2023-10-28 RX ADMIN — APIXABAN 5 MG: 5 TABLET, FILM COATED ORAL at 20:51

## 2023-10-28 RX ADMIN — BUDESONIDE 250 MCG: 0.25 SUSPENSION RESPIRATORY (INHALATION) at 16:17

## 2023-10-28 RX ADMIN — METOLAZONE 5 MG: 2.5 TABLET ORAL at 17:05

## 2023-10-28 RX ADMIN — BUDESONIDE 250 MCG: 0.25 SUSPENSION RESPIRATORY (INHALATION) at 06:06

## 2023-10-28 RX ADMIN — GABAPENTIN 300 MG: 300 CAPSULE ORAL at 20:51

## 2023-10-28 RX ADMIN — TRAZODONE HYDROCHLORIDE 50 MG: 50 TABLET ORAL at 21:29

## 2023-10-28 RX ADMIN — PANTOPRAZOLE SODIUM 40 MG: 40 TABLET, DELAYED RELEASE ORAL at 06:10

## 2023-10-28 RX ADMIN — APIXABAN 5 MG: 5 TABLET, FILM COATED ORAL at 09:01

## 2023-10-28 RX ADMIN — CEFEPIME 2000 MG: 2 INJECTION, POWDER, FOR SOLUTION INTRAVENOUS at 11:37

## 2023-10-28 RX ADMIN — POTASSIUM CHLORIDE 20 MEQ: 1500 TABLET, EXTENDED RELEASE ORAL at 09:02

## 2023-10-28 RX ADMIN — FLUTICASONE PROPIONATE 1 SPRAY: 50 SPRAY, METERED NASAL at 08:59

## 2023-10-28 RX ADMIN — METOPROLOL SUCCINATE 25 MG: 25 TABLET, EXTENDED RELEASE ORAL at 09:02

## 2023-10-28 RX ADMIN — SODIUM CHLORIDE, PRESERVATIVE FREE 10 ML: 5 INJECTION INTRAVENOUS at 11:35

## 2023-10-28 RX ADMIN — MONTELUKAST 10 MG: 10 TABLET, FILM COATED ORAL at 20:51

## 2023-10-28 RX ADMIN — SODIUM CHLORIDE, PRESERVATIVE FREE 10 ML: 5 INJECTION INTRAVENOUS at 09:01

## 2023-10-28 RX ADMIN — INSULIN LISPRO 6 UNITS: 100 INJECTION, SOLUTION INTRAVENOUS; SUBCUTANEOUS at 11:34

## 2023-10-28 RX ADMIN — VANCOMYCIN HYDROCHLORIDE 1000 MG: 1 INJECTION, POWDER, LYOPHILIZED, FOR SOLUTION INTRAVENOUS at 18:27

## 2023-10-28 RX ADMIN — ARFORMOTEROL TARTRATE 15 MCG: 15 SOLUTION RESPIRATORY (INHALATION) at 06:06

## 2023-10-28 RX ADMIN — INSULIN GLARGINE 30 UNITS: 100 INJECTION, SOLUTION SUBCUTANEOUS at 09:00

## 2023-10-28 RX ADMIN — BUSPIRONE HYDROCHLORIDE 5 MG: 5 TABLET ORAL at 09:01

## 2023-10-28 RX ADMIN — ARFORMOTEROL TARTRATE 15 MCG: 15 SOLUTION RESPIRATORY (INHALATION) at 16:17

## 2023-10-28 RX ADMIN — ATORVASTATIN CALCIUM 40 MG: 40 TABLET, FILM COATED ORAL at 20:50

## 2023-10-28 RX ADMIN — FUROSEMIDE 40 MG: 10 INJECTION, SOLUTION INTRAMUSCULAR; INTRAVENOUS at 17:05

## 2023-10-28 RX ADMIN — SODIUM CHLORIDE, PRESERVATIVE FREE 10 ML: 5 INJECTION INTRAVENOUS at 20:55

## 2023-10-28 ASSESSMENT — PAIN SCALES - GENERAL: PAINLEVEL_OUTOF10: 0

## 2023-10-28 NOTE — PLAN OF CARE
Problem: Discharge Planning  Goal: Discharge to home or other facility with appropriate resources  Outcome: Progressing     Problem: Skin/Tissue Integrity  Goal: Absence of new skin breakdown  Description: 1. Monitor for areas of redness and/or skin breakdown  2. Assess vascular access sites hourly  3. Every 4-6 hours minimum:  Change oxygen saturation probe site  4. Every 4-6 hours:  If on nasal continuous positive airway pressure, respiratory therapy assess nares and determine need for appliance change or resting period.   Outcome: Progressing     Problem: Safety - Adult  Goal: Free from fall injury  Outcome: Progressing     Problem: ABCDS Injury Assessment  Goal: Absence of physical injury  Outcome: Progressing     Problem: Nutrition Deficit:  Goal: Optimize nutritional status  Outcome: Progressing     Problem: Chronic Conditions and Co-morbidities  Goal: Patient's chronic conditions and co-morbidity symptoms are monitored and maintained or improved  Outcome: Progressing     Problem: Pain  Goal: Verbalizes/displays adequate comfort level or baseline comfort level  Outcome: Progressing

## 2023-10-29 VITALS
HEIGHT: 67 IN | OXYGEN SATURATION: 96 % | RESPIRATION RATE: 20 BRPM | SYSTOLIC BLOOD PRESSURE: 136 MMHG | TEMPERATURE: 98 F | HEART RATE: 87 BPM | DIASTOLIC BLOOD PRESSURE: 68 MMHG | BODY MASS INDEX: 43.32 KG/M2 | WEIGHT: 276 LBS

## 2023-10-29 LAB
ALBUMIN SERPL-MCNC: 3.3 G/DL (ref 3.5–5.2)
ALP SERPL-CCNC: 92 U/L (ref 35–104)
ALT SERPL-CCNC: 7 U/L (ref 0–32)
ANION GAP SERPL CALCULATED.3IONS-SCNC: 9 MMOL/L (ref 7–16)
AST SERPL-CCNC: 12 U/L (ref 0–31)
BASOPHILS # BLD: 0.05 K/UL (ref 0–0.2)
BASOPHILS NFR BLD: 1 % (ref 0–2)
BILIRUB SERPL-MCNC: 0.5 MG/DL (ref 0–1.2)
BUN SERPL-MCNC: 49 MG/DL (ref 6–20)
CALCIUM SERPL-MCNC: 9.2 MG/DL (ref 8.6–10.2)
CHLORIDE SERPL-SCNC: 98 MMOL/L (ref 98–107)
CO2 SERPL-SCNC: 30 MMOL/L (ref 22–29)
CREAT SERPL-MCNC: 1.4 MG/DL (ref 0.5–1)
EOSINOPHIL # BLD: 0.14 K/UL (ref 0.05–0.5)
EOSINOPHILS RELATIVE PERCENT: 2 % (ref 0–6)
ERYTHROCYTE [DISTWIDTH] IN BLOOD BY AUTOMATED COUNT: 14.7 % (ref 11.5–15)
GFR SERPL CREATININE-BSD FRML MDRD: 45 ML/MIN/1.73M2
GLUCOSE BLD-MCNC: 124 MG/DL (ref 74–99)
GLUCOSE BLD-MCNC: 218 MG/DL (ref 74–99)
GLUCOSE SERPL-MCNC: 128 MG/DL (ref 74–99)
HCT VFR BLD AUTO: 35.6 % (ref 34–48)
HGB BLD-MCNC: 11.2 G/DL (ref 11.5–15.5)
IMM GRANULOCYTES # BLD AUTO: <0.03 K/UL (ref 0–0.58)
IMM GRANULOCYTES NFR BLD: 0 % (ref 0–5)
LYMPHOCYTES NFR BLD: 1.42 K/UL (ref 1.5–4)
LYMPHOCYTES RELATIVE PERCENT: 24 % (ref 20–42)
MCH RBC QN AUTO: 27 PG (ref 26–35)
MCHC RBC AUTO-ENTMCNC: 31.5 G/DL (ref 32–34.5)
MCV RBC AUTO: 85.8 FL (ref 80–99.9)
MICROORGANISM SPEC CULT: NORMAL
MICROORGANISM SPEC CULT: NORMAL
MONOCYTES NFR BLD: 0.75 K/UL (ref 0.1–0.95)
MONOCYTES NFR BLD: 13 % (ref 2–12)
NEUTROPHILS NFR BLD: 60 % (ref 43–80)
NEUTS SEG NFR BLD: 3.62 K/UL (ref 1.8–7.3)
PLATELET # BLD AUTO: 354 K/UL (ref 130–450)
PMV BLD AUTO: 9.2 FL (ref 7–12)
POTASSIUM SERPL-SCNC: 3.9 MMOL/L (ref 3.5–5)
PROT SERPL-MCNC: 6.5 G/DL (ref 6.4–8.3)
RBC # BLD AUTO: 4.15 M/UL (ref 3.5–5.5)
SERVICE CMNT-IMP: NORMAL
SERVICE CMNT-IMP: NORMAL
SODIUM SERPL-SCNC: 137 MMOL/L (ref 132–146)
SPECIMEN DESCRIPTION: NORMAL
SPECIMEN DESCRIPTION: NORMAL
WBC OTHER # BLD: 6 K/UL (ref 4.5–11.5)

## 2023-10-29 PROCEDURE — 6370000000 HC RX 637 (ALT 250 FOR IP): Performed by: INTERNAL MEDICINE

## 2023-10-29 PROCEDURE — 36415 COLL VENOUS BLD VENIPUNCTURE: CPT

## 2023-10-29 PROCEDURE — 99233 SBSQ HOSP IP/OBS HIGH 50: CPT | Performed by: INTERNAL MEDICINE

## 2023-10-29 PROCEDURE — 94640 AIRWAY INHALATION TREATMENT: CPT

## 2023-10-29 PROCEDURE — 6360000002 HC RX W HCPCS: Performed by: NURSE PRACTITIONER

## 2023-10-29 PROCEDURE — 6360000002 HC RX W HCPCS: Performed by: INTERNAL MEDICINE

## 2023-10-29 PROCEDURE — 2580000003 HC RX 258: Performed by: INTERNAL MEDICINE

## 2023-10-29 PROCEDURE — 85025 COMPLETE CBC W/AUTO DIFF WBC: CPT

## 2023-10-29 PROCEDURE — 82962 GLUCOSE BLOOD TEST: CPT

## 2023-10-29 PROCEDURE — 80053 COMPREHEN METABOLIC PANEL: CPT

## 2023-10-29 PROCEDURE — 2580000003 HC RX 258: Performed by: NURSE PRACTITIONER

## 2023-10-29 PROCEDURE — 2700000000 HC OXYGEN THERAPY PER DAY

## 2023-10-29 RX ORDER — FUROSEMIDE 20 MG/1
40 TABLET ORAL DAILY
Qty: 90 TABLET | Refills: 3 | Status: SHIPPED | OUTPATIENT
Start: 2023-10-29 | End: 2023-10-29 | Stop reason: HOSPADM

## 2023-10-29 RX ORDER — DOXYCYCLINE HYCLATE 100 MG/1
100 CAPSULE ORAL 2 TIMES DAILY
Qty: 14 CAPSULE | Refills: 0 | Status: SHIPPED | OUTPATIENT
Start: 2023-10-29 | End: 2023-11-05

## 2023-10-29 RX ORDER — TORSEMIDE 20 MG/1
20 TABLET ORAL 2 TIMES DAILY
Qty: 30 TABLET | Refills: 3 | Status: SHIPPED | OUTPATIENT
Start: 2023-10-30

## 2023-10-29 RX ORDER — TORSEMIDE 20 MG/1
20 TABLET ORAL 2 TIMES DAILY
Status: DISCONTINUED | OUTPATIENT
Start: 2023-10-30 | End: 2023-10-29 | Stop reason: HOSPADM

## 2023-10-29 RX ADMIN — METOPROLOL SUCCINATE 25 MG: 25 TABLET, EXTENDED RELEASE ORAL at 08:44

## 2023-10-29 RX ADMIN — SODIUM CHLORIDE, PRESERVATIVE FREE 10 ML: 5 INJECTION INTRAVENOUS at 08:45

## 2023-10-29 RX ADMIN — INSULIN GLARGINE 30 UNITS: 100 INJECTION, SOLUTION SUBCUTANEOUS at 08:45

## 2023-10-29 RX ADMIN — FLUTICASONE PROPIONATE 1 SPRAY: 50 SPRAY, METERED NASAL at 08:46

## 2023-10-29 RX ADMIN — ACETAMINOPHEN 650 MG: 325 TABLET ORAL at 08:43

## 2023-10-29 RX ADMIN — APIXABAN 5 MG: 5 TABLET, FILM COATED ORAL at 08:44

## 2023-10-29 RX ADMIN — BUSPIRONE HYDROCHLORIDE 5 MG: 5 TABLET ORAL at 08:44

## 2023-10-29 RX ADMIN — ARFORMOTEROL TARTRATE 15 MCG: 15 SOLUTION RESPIRATORY (INHALATION) at 06:02

## 2023-10-29 RX ADMIN — DULOXETINE HYDROCHLORIDE 60 MG: 60 CAPSULE, DELAYED RELEASE ORAL at 08:44

## 2023-10-29 RX ADMIN — CLOPIDOGREL BISULFATE 75 MG: 75 TABLET ORAL at 08:44

## 2023-10-29 RX ADMIN — SODIUM CHLORIDE, PRESERVATIVE FREE 10 ML: 5 INJECTION INTRAVENOUS at 11:29

## 2023-10-29 RX ADMIN — INSULIN LISPRO 2 UNITS: 100 INJECTION, SOLUTION INTRAVENOUS; SUBCUTANEOUS at 11:54

## 2023-10-29 RX ADMIN — PANTOPRAZOLE SODIUM 40 MG: 40 TABLET, DELAYED RELEASE ORAL at 05:02

## 2023-10-29 RX ADMIN — FUROSEMIDE 40 MG: 10 INJECTION, SOLUTION INTRAMUSCULAR; INTRAVENOUS at 08:45

## 2023-10-29 RX ADMIN — CEFEPIME 2000 MG: 2 INJECTION, POWDER, FOR SOLUTION INTRAVENOUS at 11:29

## 2023-10-29 RX ADMIN — BUDESONIDE 250 MCG: 0.25 SUSPENSION RESPIRATORY (INHALATION) at 06:03

## 2023-10-29 RX ADMIN — POTASSIUM CHLORIDE 20 MEQ: 1500 TABLET, EXTENDED RELEASE ORAL at 08:44

## 2023-10-29 NOTE — DISCHARGE INSTR - COC
Description Thin;Clear 10/24/23 1745   Odor None 10/29/23 1145   Yvonne-wound Assessment Warm 10/29/23 1145   Margins Attached edges 10/25/23 2233   Number of days: 5       Wound 10/23/23 Pretibial Right weeping with several small blisters (Active)   Wound Etiology Venous 10/29/23 1145   Dressing Status Clean;Dry; Intact 10/29/23 1145   Wound Cleansed Cleansed with saline 10/27/23 0800   Dressing/Treatment Ace wrap 10/29/23 1145   Offloading for Diabetic Foot Ulcers Other (comment) 10/29/23 1145   Dressing Change Due 10/28/23 10/29/23 1145   Wound Assessment Pink/red;Slough 10/25/23 2228   Drainage Amount Moderate (25-50%) 10/25/23 2228   Drainage Description Serosanguinous 10/25/23 2228   Odor None 10/29/23 1145   Yvonne-wound Assessment Warm 10/29/23 1145   Margins Attached edges 10/29/23 1145   Number of days: 5       Wound 10/28/23 Toe (Comment  which one) Right skin tear/cleansed and bandaid applied (Active)   Number of days: 1       Incision 01/23/21 Anterior;Right (Active)   Number of days: 1009        Elimination:  Continence: Bowel: {YES / BT:90237}  Bladder: {YES / NI:69576}  Urinary Catheter: {Urinary Catheter:680969814}   Colostomy/Ileostomy/Ileal Conduit: {YES / TR:20692}       Date of Last BM: ***    Intake/Output Summary (Last 24 hours) at 10/29/2023 1430  Last data filed at 10/29/2023 0835  Gross per 24 hour   Intake 360 ml   Output --   Net 360 ml     I/O last 3 completed shifts:   In: 5 [P.O.:540]  Out: 1600 [Urine:1600]    Safety Concerns:     [employer] Safety Concerns:089186196}    Impairments/Disabilities:      [employer] Impairments/Disabilities:336457526}    Nutrition Therapy:  Current Nutrition Therapy:   [employer] Diet List:431153680}    Routes of Feeding: {CHP DME Other Feedings:321869486}  Liquids: {Slp liquid thickness:46118}  Daily Fluid Restriction: {CHP DME Yes amt example:517046827}  Last Modified Barium Swallow with Video (Video Swallowing Test): {Done Not Done OKVT:946272191}    Treatments

## 2023-10-29 NOTE — CARE COORDINATION
10/29/2023 1445 CM spoke with pt for transition of care needs at d/c. Pt is being discharged home today on (PO) antibiotics and she wants [address] for nursing to assist with dressing changes to her legs. Pt chose [facility] and referral was called to Janee Hadley in the [address] [place] [address] [address]. Pt is being provided dressing supplies by nursing and her grand daughter will assist with Dressings until [address] can start. Pt is also new on [employer] and pt was provided and explained the 30 day free trial card, and anticoagulation teaching was initiated. Pt verbalized understanding. Pt has a Cpap through [facility] and did not qualify for Home O2. Pt's family will provide transportation home. CM will follow.  Electronically signed by Krystal Diallo RN on 10/29/2023 at 2:57 PM

## 2023-11-06 RX ORDER — SACUBITRIL AND VALSARTAN 24; 26 MG/1; MG/1
TABLET, FILM COATED ORAL
Qty: 90 TABLET | Refills: 3 | Status: SHIPPED | OUTPATIENT
Start: 2023-11-06

## 2023-11-09 ENCOUNTER — HOSPITAL ENCOUNTER (OUTPATIENT)
Dept: OTHER | Age: 58
Setting detail: THERAPIES SERIES
Discharge: HOME | End: 2023-11-09
Payer: MEDICARE

## 2023-11-09 VITALS
OXYGEN SATURATION: 98 % | BODY MASS INDEX: 35.71 KG/M2 | RESPIRATION RATE: 20 BRPM | SYSTOLIC BLOOD PRESSURE: 106 MMHG | HEART RATE: 96 BPM | WEIGHT: 228.04 LBS | DIASTOLIC BLOOD PRESSURE: 58 MMHG

## 2023-11-09 LAB
ANION GAP SERPL CALCULATED.3IONS-SCNC: 17 MMOL/L (ref 7–16)
BNP SERPL-MCNC: 2422 PG/ML (ref 0–450)
BUN SERPL-MCNC: 47 MG/DL (ref 6–20)
CALCIUM SERPL-MCNC: 7 MG/DL (ref 8.6–10.2)
CHLORIDE SERPL-SCNC: 93 MMOL/L (ref 98–107)
CO2 SERPL-SCNC: 31 MMOL/L (ref 22–29)
CREAT SERPL-MCNC: 1.5 MG/DL (ref 0.5–1)
GFR SERPL CREATININE-BSD FRML MDRD: 42 ML/MIN/1.73M2
GLUCOSE SERPL-MCNC: 118 MG/DL (ref 74–99)
POTASSIUM SERPL-SCNC: 4.9 MMOL/L (ref 3.5–5)
SODIUM SERPL-SCNC: 141 MMOL/L (ref 132–146)

## 2023-11-09 PROCEDURE — 80048 BASIC METABOLIC PNL TOTAL CA: CPT

## 2023-11-09 PROCEDURE — 36415 COLL VENOUS BLD VENIPUNCTURE: CPT

## 2023-11-09 PROCEDURE — 83880 ASSAY OF NATRIURETIC PEPTIDE: CPT

## 2023-11-09 PROCEDURE — 99204 OFFICE O/P NEW MOD 45 MIN: CPT

## 2023-11-09 RX ORDER — SEMAGLUTIDE 1.34 MG/ML
INJECTION, SOLUTION SUBCUTANEOUS
COMMUNITY

## 2023-11-09 RX ORDER — METOPROLOL SUCCINATE 50 MG/1
50 TABLET, EXTENDED RELEASE ORAL NIGHTLY
Qty: 90 TABLET | Refills: 3 | Status: SHIPPED | OUTPATIENT
Start: 2023-11-09

## 2023-11-09 ASSESSMENT — PATIENT HEALTH QUESTIONNAIRE - PHQ9
SUM OF ALL RESPONSES TO PHQ QUESTIONS 1-9: 1
2. FEELING DOWN, DEPRESSED OR HOPELESS: 0
SUM OF ALL RESPONSES TO PHQ9 QUESTIONS 1 & 2: 1
SUM OF ALL RESPONSES TO PHQ QUESTIONS 1-9: 1
1. LITTLE INTEREST OR PLEASURE IN DOING THINGS: 1

## 2023-11-09 NOTE — PROGRESS NOTES
Congestive Heart Failure [address] [facility]       Referring Provider: Dr. Cathy Lawson  Primary Care Physician: Aiden Small MD   Cardiologist: DR. Syl Rojas APRN-CNP   Nephrologist: Dr. Tian Arboleda -- newly established with; Dec 1st    HISTORY OF PRESENT ILLNESS:     Lily Chicas is a 60 y.o. (1965) female with a history of HFrEF (EF < 40%), most recent EF:  Lab Results   Component Value Date    LVEF 35 06/11/2021      Recent TTE 10/24/23  EF HFrEF 15%       She presents to the CHF clinic for ongoing evaluation and monitoring of heart failure. In the CHF clinic today she denies any adverse symptoms except:  [x] Dizziness or lightheadedness-- when she initially stands up. [] Syncope or near syncope  [] Recent Fall  [] Shortness of breath at rest   [x] Dyspnea with exertion-- baseline  [] Decline in functional capacity (unable to perform activities they had previously been able to do)  [] Fatigue   [] Orthopnea  [] PND  [] Nocturnal cough  [] Hemoptysis  [] Chest pain, pressure, or discomfort  [] Palpitations  [] Abdominal distention  [] Abdominal bloating  [] Early satiety  [] Blood in stool   [] Diarrhea  [] Constipation  [] Nausea/Vomiting  [] Decreased urinary response to oral diuretic   [] Scrotal swelling   [x] Lower extremity edema  [] Used PRN doses of oral diuretic   [] Weight gain    Wt Readings from Last 3 Encounters:   11/09/23 103.4 kg (228 lb 0.6 oz)   10/29/23 125.2 kg (276 lb)   02/22/23 111.1 kg (245 lb)       SOCIAL HISTORY:  [] Denies tobacco, alcohol or illicit drug abuse  [x] Tobacco use: 1 pack daily 1/2 pack daily  [] ETOH use:  [] Illicit drug use:        MEDICATIONS:    Allergies   Allergen Reactions    Ace Inhibitors Other (See Comments)     cough    Percocet [Oxycodone-Acetaminophen] Other (See Comments)     Hallucinates very badly     Prior to Visit Medications    Medication Sig Taking?  Authorizing Provider   Semaglutide,0.25 or

## 2023-11-10 ENCOUNTER — TELEPHONE (OUTPATIENT)
Dept: OTHER | Age: 58
End: 2023-11-10

## 2023-11-10 NOTE — TELEPHONE ENCOUNTER
Meeta Patel 1965 [phone]      11/10/2023  Received titration orders via inbasket from [facility]. Increase Toprol xl 50 mg nightly   2. Follow up at scheduled        3:13 PM    Called patient, antoine Yao Kind re: provider instructions. I have attempted to contact this patient by phone with the following results: left message to return my call on answering machine ( provided a contact number) .

## 2023-11-10 NOTE — TELEPHONE ENCOUNTER
----- Message from DAVID Gallegos CNP sent at 11/9/2023  4:40 PM EST -----  Labs and CHF clinic note reviewed  Will increase Toprol 50 mg nightly

## 2023-11-10 NOTE — TELEPHONE ENCOUNTER
Ritta Comfort 1965 [phone]      11/10/2023  Received titration orders via inbasket from [facility]. Increase Toprol xl 50 mg nightly   2. Follow up at scheduled        3:23 PM      Pt's granddaughter called, Giovany Crespo, and stated Abram Concepcion received a call re: medication changes. She handles her medications for her. I have reviewed the provider's instructions with the patient, answering all questions to her satisfaction.

## 2023-11-10 NOTE — TELEPHONE ENCOUNTER
Daisy Brijesh 1965 [phone]     11/10/2023  Received titration orders via inbasket from [facility]. Increase Toprol xl 50 mg nightly   2. Follow up at scheduled      2:13 PM   Called patient re: provider instructions. I have attempted to contact this patient by phone with the following results: left message to return my call on answering machine ( provided a contact number) .          Future Appointments   Date Time Provider [address]   11/16/2023  8:00 AM [facility] CHF ROOM 2 [facility]   11/27/2023  1:15 PM Ted Valdovinos MD [facility]

## 2023-11-15 ENCOUNTER — TELEPHONE (OUTPATIENT)
Dept: OTHER | Age: 58
End: 2023-11-15

## 2023-11-15 DIAGNOSIS — Z13.9 ENCOUNTER FOR SCREENING INVOLVING SOCIAL DETERMINANTS OF HEALTH (SDOH): Primary | ICD-10-CM

## 2023-11-15 DIAGNOSIS — Z59.48 LACK OF ADEQUATE FOOD: ICD-10-CM

## 2023-11-15 SDOH — ECONOMIC STABILITY - FOOD INSECURITY: OTHER SPECIFIED LACK OF ADEQUATE FOOD: Z59.48

## 2023-11-15 NOTE — TELEPHONE ENCOUNTER
CHF CHW Initial Call  11/15/2023  9:23 AM    CHW received referral from Shania Kerr RN. Patient need: information about how to obtain food at a food pantry/ food bank  Notes: CHW called patient to assist with food insecurity. I called pt and it went straight to voicemail. I left a message. Follow up plan: I will anticipate a call back or I will call again on 11/17/23  Next anticipated outreach: 11/17/23          Patient in agreement with plan and further assistance. [x] Agreed    [] Declined      CHW informed patient of the services and resources that can be provided to them. CHW instructed patient to call [facility] CHW at [phone] for any future assistance.      Electronically signed by Sun Milian on 11/15/2023 at 9:23 AM

## 2023-11-16 ENCOUNTER — HOSPITAL ENCOUNTER (OUTPATIENT)
Dept: OTHER | Age: 58
Setting detail: THERAPIES SERIES
Discharge: HOME | End: 2023-11-16
Payer: MEDICARE

## 2023-11-16 NOTE — PROGRESS NOTES
Called patient re: no call no show at today's scheduled  CHF clinic appointment. Pt did not answer. Left a VM and provided a contact number.      Future Appointments   Date Time Provider [address]   11/27/2023  1:15 PM Jennifer Payton MD [facility]

## 2023-11-17 NOTE — TELEPHONE ENCOUNTER
[facility] CHW Follow up Call  11/17/2023  9:44 AM     Notes: CHW called patient to assist with food insecurity. I called pt and it went straight to voicemail. I left a message. I will anticipate a return call back, but if not I will call again on 11/20/23. Provided patient with: Information about how to obtain food at a food pantry/ food bank  Follow up plan:  I will anticipate a return call back, but if not I will call again on 11/20/23. After my third call I will send a letter. Next anticipated outreach: 11/20/23    CHW instructed patient to call [facility] CHW at [phone] for further assistance.     Electronically signed by Ines Blanco on 11/17/2023 at 9:44 AM

## 2023-11-27 ENCOUNTER — OFFICE VISIT (OUTPATIENT)
Dept: CARDIOLOGY CLINIC | Age: 58
End: 2023-11-27
Payer: MEDICARE

## 2023-11-27 VITALS
DIASTOLIC BLOOD PRESSURE: 68 MMHG | WEIGHT: 232 LBS | HEART RATE: 95 BPM | HEIGHT: 67 IN | RESPIRATION RATE: 18 BRPM | SYSTOLIC BLOOD PRESSURE: 124 MMHG | BODY MASS INDEX: 36.41 KG/M2

## 2023-11-27 DIAGNOSIS — I25.5 ISCHEMIC CARDIOMYOPATHY: ICD-10-CM

## 2023-11-27 DIAGNOSIS — I25.810 CORONARY ARTERY DISEASE INVOLVING NONAUTOLOGOUS BIOLOGICAL CORONARY BYPASS GRAFT WITHOUT ANGINA PECTORIS: Primary | ICD-10-CM

## 2023-11-27 PROCEDURE — G8484 FLU IMMUNIZE NO ADMIN: HCPCS | Performed by: INTERNAL MEDICINE

## 2023-11-27 PROCEDURE — 1111F DSCHRG MED/CURRENT MED MERGE: CPT | Performed by: INTERNAL MEDICINE

## 2023-11-27 PROCEDURE — G8417 CALC BMI ABV UP PARAM F/U: HCPCS | Performed by: INTERNAL MEDICINE

## 2023-11-27 PROCEDURE — G8427 DOCREV CUR MEDS BY ELIG CLIN: HCPCS | Performed by: INTERNAL MEDICINE

## 2023-11-27 PROCEDURE — 93000 ELECTROCARDIOGRAM COMPLETE: CPT | Performed by: INTERNAL MEDICINE

## 2023-11-27 PROCEDURE — 4004F PT TOBACCO SCREEN RCVD TLK: CPT | Performed by: INTERNAL MEDICINE

## 2023-11-27 PROCEDURE — 99214 OFFICE O/P EST MOD 30 MIN: CPT | Performed by: INTERNAL MEDICINE

## 2023-11-27 PROCEDURE — 3017F COLORECTAL CA SCREEN DOC REV: CPT | Performed by: INTERNAL MEDICINE

## 2023-11-27 RX ORDER — AMIODARONE HYDROCHLORIDE 200 MG/1
200 TABLET ORAL DAILY
Qty: 90 TABLET | Refills: 0 | Status: SHIPPED | OUTPATIENT
Start: 2023-11-27

## 2023-11-27 RX ORDER — REGADENOSON 0.08 MG/ML
0.4 INJECTION, SOLUTION INTRAVENOUS
OUTPATIENT
Start: 2023-11-27

## 2023-11-27 NOTE — PROGRESS NOTES
\"PTINR\"  PT/INR Warfarin:  No components found for: \"PTPATWAR\", \"PTINRWAR\"  PTT:    Lab Results   Component Value Date/Time    APTT 27.8 04/30/2018 12:05 PM     PTT Heparin:  No components found for: \"APTTHEP\"  Magnesium:    Lab Results   Component Value Date/Time    MG 1.7 10/28/2023 05:10 AM     TSH:    Lab Results   Component Value Date/Time    TSH 2.32 10/24/2023 06:18 AM     TROPONIN:  No components found for: \"TROP\"  BNP:    Lab Results   Component Value Date/Time    BNP 65 06/17/2011 09:49 AM     FASTING LIPID PANEL:    Lab Results   Component Value Date/Time    CHOL 151 06/11/2021 02:25 PM    HDL 33 06/11/2021 02:25 PM    TRIG 241 06/11/2021 02:25 PM     No orders to display     I have personally reviewed the laboratory, cardiac diagnostic and radiographic testing as outlined above:      IMPRESSION:  1: Atrial flutter: Newly diagnosed, s/p DC cardioversion, now in sinus rhythm, will continue amiodarone, continue Eliquis therapy  2. CAD: Status post CABG  In 2018, we'll obtain records from [facility], abnormal stress test in June of 2021, continue current treatment           2: Chronic systolic congestive heart failure: Compensated will continue current treatment. 3: Ischemic cardiomyopathy: Last documented ejection fraction was15+/-5 %., declined LifeVest, continue current treatment          4: Hypertension: Controlled, continue current treatment. 5: Mitral valve regurgitation: Moderate            6: Tricuspid valve regurgitation: Severe  7: Pulmonary hypertension:      8: Chronic obstructive pulmonary disease, unspecified          9: History of CVA           10: Type 2 diabetes mellitus with other circulatory complication   11: Tobacco abuse: Patient was counseled regarding smoking cessation  12: Preop clearance for epidural injection          RECOMMENDATIONS:   1. Discontinue Plavix  2.   Continue the rest of medications  3.  stress test to rule out ischemic

## 2023-12-07 ENCOUNTER — TELEPHONE (OUTPATIENT)
Dept: CARDIOLOGY | Age: 58
End: 2023-12-07

## 2023-12-07 NOTE — TELEPHONE ENCOUNTER
Appempt to reach the patient was unsuccessful. Left a VM reminding her of her 730am stress test at [facility] and where to report. She was instructed on NPO after MN except meds with water but to avoid taking her diabetic medication and also avoid caffeine for 12 hours prior. Phone number provided to return call with any questions or to cancel.

## 2023-12-12 ENCOUNTER — HOSPITAL ENCOUNTER (OUTPATIENT)
Dept: CARDIOLOGY | Age: 58
Discharge: HOME | End: 2023-12-14
Attending: INTERNAL MEDICINE
Payer: MEDICARE

## 2023-12-12 VITALS
SYSTOLIC BLOOD PRESSURE: 125 MMHG | WEIGHT: 222 LBS | DIASTOLIC BLOOD PRESSURE: 75 MMHG | HEIGHT: 67 IN | HEART RATE: 79 BPM | BODY MASS INDEX: 34.84 KG/M2

## 2023-12-12 DIAGNOSIS — I25.5 ISCHEMIC CARDIOMYOPATHY: ICD-10-CM

## 2023-12-12 DIAGNOSIS — I25.810 CORONARY ARTERY DISEASE INVOLVING NONAUTOLOGOUS BIOLOGICAL CORONARY BYPASS GRAFT WITHOUT ANGINA PECTORIS: ICD-10-CM

## 2023-12-12 LAB
ECHO BSA: 2.18 M2
NUC STRESS EJECTION FRACTION: 26 %
STRESS BASELINE DIAS BP: 75 MMHG
STRESS BASELINE HR: 79 BPM
STRESS BASELINE ST DEPRESSION: 0 MM
STRESS BASELINE SYS BP: 125 MMHG
STRESS ESTIMATED WORKLOAD: 1 METS
STRESS PEAK DIAS BP: 75 MMHG
STRESS PEAK SYS BP: 125 MMHG
STRESS PERCENT HR ACHIEVED: 53 %
STRESS POST PEAK HR: 85 BPM
STRESS RATE PRESSURE PRODUCT: NORMAL BPM*MMHG
STRESS ST DEPRESSION: 0 MM
STRESS TARGET HR: 161 BPM

## 2023-12-12 PROCEDURE — 78452 HT MUSCLE IMAGE SPECT MULT: CPT | Performed by: INTERNAL MEDICINE

## 2023-12-12 PROCEDURE — 3430000000 HC RX DIAGNOSTIC RADIOPHARMACEUTICAL: Performed by: INTERNAL MEDICINE

## 2023-12-12 PROCEDURE — 93018 CV STRESS TEST I&R ONLY: CPT | Performed by: INTERNAL MEDICINE

## 2023-12-12 PROCEDURE — 93017 CV STRESS TEST TRACING ONLY: CPT

## 2023-12-12 PROCEDURE — A9502 TC99M TETROFOSMIN: HCPCS | Performed by: INTERNAL MEDICINE

## 2023-12-12 PROCEDURE — 2580000003 HC RX 258: Performed by: INTERNAL MEDICINE

## 2023-12-12 PROCEDURE — 93016 CV STRESS TEST SUPVJ ONLY: CPT | Performed by: INTERNAL MEDICINE

## 2023-12-12 PROCEDURE — 6360000002 HC RX W HCPCS: Performed by: INTERNAL MEDICINE

## 2023-12-12 RX ORDER — REGADENOSON 0.08 MG/ML
0.4 INJECTION, SOLUTION INTRAVENOUS
Status: COMPLETED | OUTPATIENT
Start: 2023-12-12 | End: 2023-12-12

## 2023-12-12 RX ORDER — SODIUM CHLORIDE 0.9 % (FLUSH) 0.9 %
10 SYRINGE (ML) INJECTION PRN
Status: DISCONTINUED | OUTPATIENT
Start: 2023-12-12 | End: 2023-12-15 | Stop reason: HOSPADM

## 2023-12-12 RX ADMIN — SODIUM CHLORIDE, PRESERVATIVE FREE 10 ML: 5 INJECTION INTRAVENOUS at 08:54

## 2023-12-12 RX ADMIN — TETROFOSMIN 32.6 MILLICURIE: 0.23 INJECTION, POWDER, LYOPHILIZED, FOR SOLUTION INTRAVENOUS at 08:53

## 2023-12-12 RX ADMIN — TETROFOSMIN 10.1 MILLICURIE: 0.23 INJECTION, POWDER, LYOPHILIZED, FOR SOLUTION INTRAVENOUS at 07:39

## 2023-12-12 RX ADMIN — SODIUM CHLORIDE, PRESERVATIVE FREE 10 ML: 5 INJECTION INTRAVENOUS at 07:40

## 2023-12-12 RX ADMIN — REGADENOSON 0.4 MG: 0.08 INJECTION, SOLUTION INTRAVENOUS at 08:53

## 2023-12-13 ENCOUNTER — TELEPHONE (OUTPATIENT)
Dept: CARDIOLOGY CLINIC | Age: 58
End: 2023-12-13

## 2023-12-13 NOTE — TELEPHONE ENCOUNTER
MD Sammie Angel MA Hi Shelia:  Would you please see if she can stop by on Thursday so I can talk to her about extra stress  Thank you    Called patient scheduled appointment

## 2023-12-14 ENCOUNTER — OFFICE VISIT (OUTPATIENT)
Dept: CARDIOLOGY CLINIC | Age: 58
End: 2023-12-14

## 2023-12-14 VITALS
HEART RATE: 80 BPM | HEIGHT: 67 IN | DIASTOLIC BLOOD PRESSURE: 68 MMHG | RESPIRATION RATE: 16 BRPM | BODY MASS INDEX: 34.84 KG/M2 | SYSTOLIC BLOOD PRESSURE: 122 MMHG | WEIGHT: 222 LBS

## 2023-12-14 DIAGNOSIS — I25.810 CORONARY ARTERY DISEASE INVOLVING NONAUTOLOGOUS BIOLOGICAL CORONARY BYPASS GRAFT WITHOUT ANGINA PECTORIS: Primary | ICD-10-CM

## 2025-01-14 NOTE — DISCHARGE SUMMARY
Dictate [id number]
Positive for the usual childhood diseases,  history of anxiety, coronary artery disease, cardiomyopathy, CVA, COPD,  depression, type 2 diabetes mellitus, hyperlipidemia, and sleep apnea. PHYSICAL EXAMINATION:  VITAL SIGNS:  Shows blood pressure to be 124/77, pulse of 100,  respirations 20, O2 saturation 97%. GENERAL APPEARANCE:  Reveals an awake 58-year-old white female who was  slightly dyspneic. HEENT:  Normal.  NECK:  With adequate carotid upstrokes without associated bruits. Trachea midline. Thyroid not palpable. LUNGS:  Clear. Diminished at the base with end-expiratory wheeze. HEART:  Fairly regular without ectopy. ABDOMEN:  Obese. EXTREMITIES:  Revealed +3 pitting edema. LABORATORY DATA:  While the patient was in the hospital, she had the  following laboratory studies performed:  Hemoglobin and hematocrit of  12.3 and 38.2 respectively, white count 7000, platelet count adequate. BUN and creatinine were 47 and 2.1 respectively. Electrolytes were  normal.    HOSPITAL COURSE OF STAY:  The patient was admitted directly to the  hospital to the intermediate care unit. The patient admitted under the  service of Dr. Boo Iverson and Dr. Mikel Velasquez. The patient did appear to be having  signs of volume overload. Cardiology consult was obtained at this time  along with adjustment in medications. The patient did show sign of  peripheral edema, diuretic was monitored at this time for the most part. The patient clinically improved. We adjusted her medication. For the  most part, there was evidence of renal insufficiency noted, at which  time, the patient was discontinued from [facility] [place]. Cardiology was  consulted, who did make recommendation on the medication. Diuretics  were given for the most part and the patient clinically improved. Optimal care was given at this time. The patient was felt stable enough  to be discharged home on 10/29/2023.   At the time of discharge on  10/29/2023, revealed the
but forgetful/oriented to person, place, time and situation

## (undated) DEVICE — NEEDLE HYPO 25GA L1.5IN BLU POLYPR HUB S STL REG BVL STR

## (undated) DEVICE — INTENDED FOR TISSUE SEPARATION, AND OTHER PROCEDURES THAT REQUIRE A SHARP SURGICAL BLADE TO PUNCTURE OR CUT.: Brand: BARD-PARKER ® STAINLESS STEEL BLADES

## (undated) DEVICE — APPLICATOR MEDICATED 26 CC SOLUTION HI LT ORNG CHLORAPREP

## (undated) DEVICE — SYRINGE MED 10ML LUERLOCK TIP W/O SFTY DISP

## (undated) DEVICE — PACK EXT II SIRUS

## (undated) DEVICE — MARKER,SKIN,WI/RULER AND LABELS: Brand: MEDLINE

## (undated) DEVICE — SET SURG INSTR PODI

## (undated) DEVICE — DOUBLE BASIN SET: Brand: MEDLINE INDUSTRIES, INC.

## (undated) DEVICE — TOWEL,OR,DSP,ST,BLUE,STD,6/PK,12PK/CS: Brand: MEDLINE

## (undated) DEVICE — TUBING, SUCTION, 1/4" X 10', STRAIGHT: Brand: MEDLINE

## (undated) DEVICE — SYRINGE MED 10ML TRNSLUC BRL PLUNG BLK MRK POLYPR CTRL

## (undated) DEVICE — SPONGE LAP W18XL18IN WHT COT 4 PLY FLD STRUNG RADPQ DISP ST

## (undated) DEVICE — SET SURG BASIN MAYO REUSABLE

## (undated) DEVICE — DRESSING GZ W1XL8IN COT XRFRM N ADH OVERWRAP CURAD

## (undated) DEVICE — GARMENT,MEDLINE,DVT,INT,CALF,MED, GEN2: Brand: MEDLINE

## (undated) DEVICE — SWAB SPEC COLL SHFT L5.25IN POLYUR FOAM TIP SFT DBL MEDIA

## (undated) DEVICE — YANKAUER,BULB TIP,W/O VENT,RIGID,STERILE: Brand: MEDLINE

## (undated) DEVICE — GAUZE,SPONGE,4"X4",16PLY,STRL,LF,10/TRAY: Brand: MEDLINE

## (undated) DEVICE — BANDAGE COMPR M W4INXL10YD WHT BGE VELC E MTRX HK AND LOOP

## (undated) DEVICE — GOWN,SIRUS,NONRNF,SETINSLV,XL,20/CS: Brand: MEDLINE

## (undated) DEVICE — COVER,LIGHT HANDLE,FLX,1/PK: Brand: MEDLINE INDUSTRIES, INC.

## (undated) DEVICE — PENCIL ES L3M BTTN SWCH HOLSTER W/ BLDE ELECTRD EDGE

## (undated) DEVICE — SET MAJOR INSTR ORTHO

## (undated) DEVICE — CANNULA IV 18GA L15IN BLNT FILL LUERLOCK HUB MJCT

## (undated) DEVICE — BANDAGE,SELF ADHRNT,COFLEX,4"X5YD,STRL: Brand: COLABEL

## (undated) DEVICE — CLOTH SURG PREP PREOPERATIVE CHLORHEXIDINE GLUC 2% READYPREP

## (undated) DEVICE — SYRINGE IRRIG 60ML SFT PLIABLE BLB EZ TO GRP 1 HND USE W/

## (undated) DEVICE — NDL CNTR 40CT FM MAG: Brand: MEDLINE INDUSTRIES, INC.

## (undated) DEVICE — BANDAGE,GAUZE,4.5"X4.1YD,STERILE,LF: Brand: MEDLINE

## (undated) DEVICE — GLOVE ORTHO 7   MSG9470

## (undated) DEVICE — GAUZE,SPONGE,4"X4",16PLY,XRAY,STRL,LF: Brand: MEDLINE

## (undated) DEVICE — PADDING,UNDERCAST,COTTON, 4"X4YD STERILE: Brand: MEDLINE

## (undated) DEVICE — Z DISCONTINUED GLOVE SURG SZ 7 L12IN FNGR THK13MIL WHT ISOLEX POLYISOPRENE

## (undated) DEVICE — ELECTRODE PT RET AD L9FT HI MOIST COND ADH HYDRGEL CORDED

## (undated) DEVICE — SPECIMEN CUP W/LID: Brand: DEROYAL